# Patient Record
Sex: MALE | Race: WHITE | NOT HISPANIC OR LATINO | Employment: OTHER | URBAN - NONMETROPOLITAN AREA
[De-identification: names, ages, dates, MRNs, and addresses within clinical notes are randomized per-mention and may not be internally consistent; named-entity substitution may affect disease eponyms.]

---

## 2017-01-03 ENCOUNTER — HOSPITAL ENCOUNTER (OUTPATIENT)
Dept: OTHER | Facility: HOSPITAL | Age: 67
Discharge: HOME | End: 2017-01-03
Attending: NURSE PRACTITIONER | Admitting: NURSE PRACTITIONER

## 2017-01-03 ENCOUNTER — HOSPITAL ENCOUNTER (OUTPATIENT)
Dept: URGENT CARE | Facility: CLINIC | Age: 67
Discharge: HOME | End: 2017-01-03
Attending: FAMILY MEDICINE | Admitting: FAMILY MEDICINE

## 2017-01-03 ENCOUNTER — HOSPITAL ENCOUNTER (OUTPATIENT)
Dept: OTHER | Facility: HOSPITAL | Age: 67
Discharge: HOME | End: 2017-01-03

## 2017-01-03 LAB
ALBUMIN SERPL-MCNC: 3.7 GM/DL (ref 3.4–4.8)
ALP SERPL-CCNC: 58 U/L (ref 38–126)
ALT SERPL-CCNC: 31 U/L (ref 21–72)
ANION GAP SERPL CALCULATED.3IONS-SCNC: 10 MMOL/L (ref 5–15)
AST SERPL-CCNC: 22 U/L (ref 17–59)
BASOPHILS # BLD AUTO: 0.04 X1000/UL (ref 0–0.2)
BASOPHILS NFR BLD AUTO: 0.4 % (ref 0–2)
BILIRUB SERPL-MCNC: 0.5 MG/DL (ref 0.2–1.3)
BUN SERPL-MCNC: 34 MG/DL (ref 7–21)
CALCIUM SERPL-MCNC: 9.1 MG/DL (ref 8.4–10.2)
CHLORIDE SERPL-SCNC: 103 MMOL/L (ref 95–110)
CO2 SERPL-SCNC: 25 MMOL/L (ref 22–31)
CONV AUTO IG#: 0.05 X1000/UL (ref 0.01–0.02)
CONV AUTO IG%: 0.5 % (ref 0–0.5)
CREAT SERPL-MCNC: 3.3 MG/DL (ref 0.7–1.3)
EOSINOPHIL # BLD AUTO: 0.17 X1000/UL (ref 0–0.7)
EOSINOPHIL NFR BLD AUTO: 1.6 % (ref 0–7)
ERYTHROCYTE [DISTWIDTH] IN BLOOD: 13.4 % (ref 11.5–14.5)
GLUCOSE SERPL-MCNC: 137 MG/DL (ref 60–100)
GRANULOCYTES # BLD AUTO: 8.1 X1000/UL (ref 2–8.6)
GRANULOCYTES NFR BLD AUTO: 76.1 % (ref 37–80)
HCT VFR BLD CALC: 33.7 % (ref 39–49)
HGB BLD-MCNC: 11.7 GM/DL (ref 13.7–17.3)
LYMPHOCYTES # BLD AUTO: 1.36 X1000/UL (ref 0.6–4.2)
LYMPHOCYTES NFR BLD AUTO: 12.8 % (ref 10–50)
MCH RBC QN: 31.4 PG (ref 26–34)
MCHC RBC-ENTMCNC: 34.7 GM/DL (ref 31.5–36.3)
MCV RBC: 90.3 FL (ref 80–98)
MONOCYTES # BLD AUTO: 0.92 X1000/UL (ref 0–0.9)
MONOCYTES NFR BLD AUTO: 8.6 % (ref 0–12)
PLATELET # BLD: 200 X1000/MM3 (ref 150–450)
PMV BLD: 11.7 FL (ref 8–12)
POTASSIUM SERPL-SCNC: 4.8 MMOL/L (ref 3.5–5.1)
PROT SERPL-MCNC: 6.9 GM/DL (ref 6.3–8.6)
RBC # BLD: 3.73 MEGA/MM3 (ref 4.37–5.74)
SODIUM SERPL-SCNC: 138 MMOL/L (ref 137–145)
WBC # BLD: 10.6 X1000/UL (ref 3.2–9.8)

## 2017-03-01 ENCOUNTER — LAB (OUTPATIENT)
Dept: LAB | Facility: HOSPITAL | Age: 67
End: 2017-03-01

## 2017-03-01 ENCOUNTER — TRANSCRIBE ORDERS (OUTPATIENT)
Dept: LAB | Facility: HOSPITAL | Age: 67
End: 2017-03-01

## 2017-03-01 DIAGNOSIS — N18.9 ANEMIA OF CHRONIC RENAL FAILURE, UNSPECIFIED STAGE: ICD-10-CM

## 2017-03-01 DIAGNOSIS — D63.1 ANEMIA OF CHRONIC RENAL FAILURE, UNSPECIFIED STAGE: ICD-10-CM

## 2017-03-01 DIAGNOSIS — E78.5 DYSLIPIDEMIA: Primary | ICD-10-CM

## 2017-03-01 DIAGNOSIS — E78.5 DYSLIPIDEMIA: ICD-10-CM

## 2017-03-01 DIAGNOSIS — I10 ESSENTIAL HYPERTENSION, BENIGN: ICD-10-CM

## 2017-03-01 DIAGNOSIS — N18.4 CHRONIC KIDNEY DISEASE, STAGE 4 (SEVERE) (HCC): ICD-10-CM

## 2017-03-01 LAB
25(OH)D3 SERPL-MCNC: 37.7 NG/ML (ref 30–100)
ALBUMIN SERPL-MCNC: 3.8 G/DL (ref 3.4–4.8)
ANION GAP SERPL CALCULATED.3IONS-SCNC: 8 MMOL/L (ref 5–15)
BUN BLD-MCNC: 40 MG/DL (ref 7–21)
BUN/CREAT SERPL: 12.8 (ref 7–25)
CALCIUM SPEC-SCNC: 9.1 MG/DL (ref 8.4–10.2)
CHLORIDE SERPL-SCNC: 105 MMOL/L (ref 95–110)
CO2 SERPL-SCNC: 25 MMOL/L (ref 22–31)
CREAT BLD-MCNC: 3.13 MG/DL (ref 0.7–1.3)
FERRITIN SERPL-MCNC: 86.1 NG/ML (ref 17.9–464)
FOLATE SERPL-MCNC: >20 NG/ML (ref 2.76–21)
GFR SERPL CREATININE-BSD FRML MDRD: 20 ML/MIN/1.73 (ref 49–113)
GLUCOSE BLD-MCNC: 96 MG/DL (ref 60–100)
HCT VFR BLD AUTO: 32 % (ref 39–49)
HGB BLD-MCNC: 10.9 G/DL (ref 13.7–17.3)
IRON 24H UR-MRATE: 84 MCG/DL (ref 49–181)
IRON SATN MFR SERPL: 27 % (ref 20–55)
PHOSPHATE SERPL-MCNC: 4.1 MG/DL (ref 2.4–4.4)
POTASSIUM BLD-SCNC: 4.8 MMOL/L (ref 3.5–5.1)
SODIUM BLD-SCNC: 138 MMOL/L (ref 137–145)
TIBC SERPL-MCNC: 311 MCG/DL (ref 261–462)
VIT B12 BLD-MCNC: 805 PG/ML (ref 239–931)

## 2017-03-01 PROCEDURE — 82306 VITAMIN D 25 HYDROXY: CPT | Performed by: INTERNAL MEDICINE

## 2017-03-01 PROCEDURE — 80069 RENAL FUNCTION PANEL: CPT | Performed by: INTERNAL MEDICINE

## 2017-03-01 PROCEDURE — 82607 VITAMIN B-12: CPT | Performed by: INTERNAL MEDICINE

## 2017-03-01 PROCEDURE — 83550 IRON BINDING TEST: CPT | Performed by: INTERNAL MEDICINE

## 2017-03-01 PROCEDURE — 82310 ASSAY OF CALCIUM: CPT | Performed by: INTERNAL MEDICINE

## 2017-03-01 PROCEDURE — 36415 COLL VENOUS BLD VENIPUNCTURE: CPT

## 2017-03-01 PROCEDURE — 83540 ASSAY OF IRON: CPT | Performed by: INTERNAL MEDICINE

## 2017-03-01 PROCEDURE — 85018 HEMOGLOBIN: CPT | Performed by: INTERNAL MEDICINE

## 2017-03-01 PROCEDURE — 85014 HEMATOCRIT: CPT | Performed by: INTERNAL MEDICINE

## 2017-03-01 PROCEDURE — 82746 ASSAY OF FOLIC ACID SERUM: CPT | Performed by: INTERNAL MEDICINE

## 2017-03-01 PROCEDURE — 83970 ASSAY OF PARATHORMONE: CPT | Performed by: INTERNAL MEDICINE

## 2017-03-01 PROCEDURE — 82728 ASSAY OF FERRITIN: CPT | Performed by: INTERNAL MEDICINE

## 2017-03-03 LAB
CALCIUM SPEC-SCNC: 9.2 MG/DL (ref 8.4–10.2)
PTH-INTACT SERPL-MCNC: 89.3 PG/ML (ref 10–65)

## 2017-06-01 ENCOUNTER — LAB (OUTPATIENT)
Dept: LAB | Facility: HOSPITAL | Age: 67
End: 2017-06-01

## 2017-06-01 ENCOUNTER — TRANSCRIBE ORDERS (OUTPATIENT)
Dept: LAB | Facility: HOSPITAL | Age: 67
End: 2017-06-01

## 2017-06-01 DIAGNOSIS — E78.5 DYSLIPIDEMIA: Primary | ICD-10-CM

## 2017-06-01 DIAGNOSIS — N18.9 ANEMIA IN CKD (CHRONIC KIDNEY DISEASE): ICD-10-CM

## 2017-06-01 DIAGNOSIS — E78.5 DYSLIPIDEMIA: ICD-10-CM

## 2017-06-01 DIAGNOSIS — N18.4 CHRONIC KIDNEY DISEASE, STAGE IV (SEVERE) (HCC): ICD-10-CM

## 2017-06-01 DIAGNOSIS — I10 ESSENTIAL HYPERTENSION, MALIGNANT: ICD-10-CM

## 2017-06-01 DIAGNOSIS — D63.1 ANEMIA IN CKD (CHRONIC KIDNEY DISEASE): ICD-10-CM

## 2017-06-01 LAB
ALBUMIN SERPL-MCNC: 3.9 G/DL (ref 3.4–4.8)
ANION GAP SERPL CALCULATED.3IONS-SCNC: 9 MMOL/L (ref 5–15)
BUN BLD-MCNC: 45 MG/DL (ref 7–21)
BUN/CREAT SERPL: 11 (ref 7–25)
CALCIUM SPEC-SCNC: 9 MG/DL (ref 8.4–10.2)
CHLORIDE SERPL-SCNC: 103 MMOL/L (ref 95–110)
CO2 SERPL-SCNC: 23 MMOL/L (ref 22–31)
CREAT BLD-MCNC: 4.1 MG/DL (ref 0.7–1.3)
GFR SERPL CREATININE-BSD FRML MDRD: 15 ML/MIN/1.73 (ref 49–113)
GLUCOSE BLD-MCNC: 96 MG/DL (ref 60–100)
HCT VFR BLD AUTO: 31.5 % (ref 39–49)
HGB BLD-MCNC: 10.7 G/DL (ref 13.7–17.3)
PHOSPHATE SERPL-MCNC: 5.4 MG/DL (ref 2.4–4.4)
POTASSIUM BLD-SCNC: 5.3 MMOL/L (ref 3.5–5.1)
SODIUM BLD-SCNC: 135 MMOL/L (ref 137–145)

## 2017-06-01 PROCEDURE — 36415 COLL VENOUS BLD VENIPUNCTURE: CPT

## 2017-06-01 PROCEDURE — 85014 HEMATOCRIT: CPT | Performed by: INTERNAL MEDICINE

## 2017-06-01 PROCEDURE — 80069 RENAL FUNCTION PANEL: CPT | Performed by: INTERNAL MEDICINE

## 2017-06-01 PROCEDURE — 85018 HEMOGLOBIN: CPT | Performed by: INTERNAL MEDICINE

## 2017-07-14 ENCOUNTER — TRANSCRIBE ORDERS (OUTPATIENT)
Dept: LAB | Facility: HOSPITAL | Age: 67
End: 2017-07-14

## 2017-07-14 ENCOUNTER — APPOINTMENT (OUTPATIENT)
Dept: LAB | Facility: HOSPITAL | Age: 67
End: 2017-07-14

## 2017-07-14 DIAGNOSIS — D63.1 ANEMIA OF CHRONIC RENAL FAILURE, UNSPECIFIED STAGE: ICD-10-CM

## 2017-07-14 DIAGNOSIS — E78.5 HYPERLIPIDEMIA, UNSPECIFIED HYPERLIPIDEMIA TYPE: Primary | ICD-10-CM

## 2017-07-14 DIAGNOSIS — N18.4 CHRONIC KIDNEY DISEASE, STAGE IV (SEVERE) (HCC): ICD-10-CM

## 2017-07-14 DIAGNOSIS — N18.9 ANEMIA OF CHRONIC RENAL FAILURE, UNSPECIFIED STAGE: ICD-10-CM

## 2017-07-14 DIAGNOSIS — I10 UNSPECIFIED ESSENTIAL HYPERTENSION: ICD-10-CM

## 2017-07-14 LAB
ALBUMIN SERPL-MCNC: 3.9 G/DL (ref 3.4–4.8)
ANION GAP SERPL CALCULATED.3IONS-SCNC: 9 MMOL/L (ref 5–15)
BUN BLD-MCNC: 44 MG/DL (ref 7–21)
BUN/CREAT SERPL: 9.6 (ref 7–25)
CALCIUM SPEC-SCNC: 9 MG/DL (ref 8.4–10.2)
CHLORIDE SERPL-SCNC: 107 MMOL/L (ref 95–110)
CO2 SERPL-SCNC: 23 MMOL/L (ref 22–31)
CREAT BLD-MCNC: 4.6 MG/DL (ref 0.7–1.3)
GFR SERPL CREATININE-BSD FRML MDRD: 13 ML/MIN/1.73 (ref 49–113)
GLUCOSE BLD-MCNC: 92 MG/DL (ref 60–100)
PHOSPHATE SERPL-MCNC: 5 MG/DL (ref 2.4–4.4)
POTASSIUM BLD-SCNC: 5 MMOL/L (ref 3.5–5.1)
SODIUM BLD-SCNC: 139 MMOL/L (ref 137–145)

## 2017-07-14 PROCEDURE — 36415 COLL VENOUS BLD VENIPUNCTURE: CPT | Performed by: INTERNAL MEDICINE

## 2017-07-14 PROCEDURE — 80069 RENAL FUNCTION PANEL: CPT | Performed by: INTERNAL MEDICINE

## 2017-08-18 ENCOUNTER — TRANSCRIBE ORDERS (OUTPATIENT)
Dept: LAB | Facility: HOSPITAL | Age: 67
End: 2017-08-18

## 2017-08-18 ENCOUNTER — LAB (OUTPATIENT)
Dept: LAB | Facility: HOSPITAL | Age: 67
End: 2017-08-18

## 2017-08-18 DIAGNOSIS — E78.5 DYSLIPIDEMIA: ICD-10-CM

## 2017-08-18 DIAGNOSIS — N18.4 ANEMIA OF CHRONIC KIDNEY FAILURE, STAGE 4 (SEVERE) (HCC): ICD-10-CM

## 2017-08-18 DIAGNOSIS — E78.5 DYSLIPIDEMIA: Primary | ICD-10-CM

## 2017-08-18 DIAGNOSIS — N18.4 CHRONIC KIDNEY DISEASE, STAGE IV (SEVERE) (HCC): ICD-10-CM

## 2017-08-18 DIAGNOSIS — D63.1 ANEMIA OF CHRONIC KIDNEY FAILURE, STAGE 4 (SEVERE) (HCC): ICD-10-CM

## 2017-08-18 DIAGNOSIS — I10 ESSENTIAL HYPERTENSION: ICD-10-CM

## 2017-08-18 LAB
ALBUMIN SERPL-MCNC: 4.2 G/DL (ref 3.4–4.8)
ANION GAP SERPL CALCULATED.3IONS-SCNC: 12 MMOL/L (ref 5–15)
BUN BLD-MCNC: 63 MG/DL (ref 7–21)
BUN/CREAT SERPL: 10.1 (ref 7–25)
CALCIUM SPEC-SCNC: 9.5 MG/DL (ref 8.4–10.2)
CHLORIDE SERPL-SCNC: 103 MMOL/L (ref 95–110)
CO2 SERPL-SCNC: 23 MMOL/L (ref 22–31)
CREAT BLD-MCNC: 6.22 MG/DL (ref 0.7–1.3)
GFR SERPL CREATININE-BSD FRML MDRD: 9 ML/MIN/1.73 (ref 49–113)
GLUCOSE BLD-MCNC: 99 MG/DL (ref 60–100)
HBV SURFACE AB SER QL: <5 (ref 0–4.99)
HBV SURFACE AB SER RIA-ACNC: ABNORMAL
HCT VFR BLD AUTO: 31.5 % (ref 39–49)
HGB BLD-MCNC: 10.9 G/DL (ref 13.7–17.3)
PHOSPHATE SERPL-MCNC: 6.4 MG/DL (ref 2.4–4.4)
POTASSIUM BLD-SCNC: 4.7 MMOL/L (ref 3.5–5.1)
SODIUM BLD-SCNC: 138 MMOL/L (ref 137–145)

## 2017-08-18 PROCEDURE — 85018 HEMOGLOBIN: CPT | Performed by: INTERNAL MEDICINE

## 2017-08-18 PROCEDURE — 85014 HEMATOCRIT: CPT | Performed by: INTERNAL MEDICINE

## 2017-08-18 PROCEDURE — 36415 COLL VENOUS BLD VENIPUNCTURE: CPT

## 2017-08-18 PROCEDURE — 80069 RENAL FUNCTION PANEL: CPT | Performed by: INTERNAL MEDICINE

## 2017-08-18 PROCEDURE — 86706 HEP B SURFACE ANTIBODY: CPT | Performed by: INTERNAL MEDICINE

## 2017-08-22 ENCOUNTER — HOSPITAL ENCOUNTER (INPATIENT)
Facility: HOSPITAL | Age: 67
LOS: 3 days | Discharge: HOME | End: 2017-08-25
Attending: FAMILY MEDICINE | Admitting: FAMILY MEDICINE

## 2017-08-22 ENCOUNTER — APPOINTMENT (OUTPATIENT)
Dept: GENERAL RADIOLOGY | Facility: HOSPITAL | Age: 67
End: 2017-08-22

## 2017-08-22 DIAGNOSIS — N18.6 ESRD (END STAGE RENAL DISEASE) (HCC): Primary | ICD-10-CM

## 2017-08-22 LAB
ALBUMIN SERPL-MCNC: 4.1 G/DL (ref 3.4–4.8)
ALBUMIN SERPL-MCNC: 4.3 G/DL (ref 3.4–4.8)
ALBUMIN/GLOB SERPL: 1.3 G/DL (ref 1.1–1.8)
ALP SERPL-CCNC: 45 U/L (ref 38–126)
ALT SERPL W P-5'-P-CCNC: 39 U/L (ref 21–72)
ANION GAP SERPL CALCULATED.3IONS-SCNC: 12 MMOL/L (ref 5–15)
ANION GAP SERPL CALCULATED.3IONS-SCNC: 12 MMOL/L (ref 5–15)
AST SERPL-CCNC: 21 U/L (ref 17–59)
BACTERIA UR QL AUTO: NORMAL /HPF
BASOPHILS # BLD AUTO: 0.08 10*3/MM3 (ref 0–0.2)
BASOPHILS NFR BLD AUTO: 1.3 % (ref 0–2)
BILIRUB SERPL-MCNC: 0.6 MG/DL (ref 0.2–1.3)
BILIRUB UR QL STRIP: NEGATIVE
BUN BLD-MCNC: 60 MG/DL (ref 7–21)
BUN BLD-MCNC: 61 MG/DL (ref 7–21)
BUN/CREAT SERPL: 10 (ref 7–25)
BUN/CREAT SERPL: 9.8 (ref 7–25)
CALCIUM SPEC-SCNC: 9 MG/DL (ref 8.4–10.2)
CALCIUM SPEC-SCNC: 9.1 MG/DL (ref 8.4–10.2)
CHLORIDE SERPL-SCNC: 104 MMOL/L (ref 95–110)
CHLORIDE SERPL-SCNC: 104 MMOL/L (ref 95–110)
CLARITY UR: CLEAR
CO2 SERPL-SCNC: 21 MMOL/L (ref 22–31)
CO2 SERPL-SCNC: 21 MMOL/L (ref 22–31)
COLOR UR: YELLOW
CREAT BLD-MCNC: 6.1 MG/DL (ref 0.7–1.3)
CREAT BLD-MCNC: 6.1 MG/DL (ref 0.7–1.3)
DEPRECATED RDW RBC AUTO: 47.4 FL (ref 35.1–43.9)
EOSINOPHIL # BLD AUTO: 0.3 10*3/MM3 (ref 0–0.7)
EOSINOPHIL NFR BLD AUTO: 4.7 % (ref 0–7)
ERYTHROCYTE [DISTWIDTH] IN BLOOD BY AUTOMATED COUNT: 14.1 % (ref 11.5–14.5)
GFR SERPL CREATININE-BSD FRML MDRD: 9 ML/MIN/1.73 (ref 49–113)
GFR SERPL CREATININE-BSD FRML MDRD: 9 ML/MIN/1.73 (ref 49–113)
GLOBULIN UR ELPH-MCNC: 3.3 GM/DL (ref 2.3–3.5)
GLUCOSE BLD-MCNC: 86 MG/DL (ref 60–100)
GLUCOSE BLD-MCNC: 88 MG/DL (ref 60–100)
GLUCOSE UR STRIP-MCNC: NEGATIVE MG/DL
HCT VFR BLD AUTO: 29.9 % (ref 39–49)
HGB BLD-MCNC: 10.3 G/DL (ref 13.7–17.3)
HGB UR QL STRIP.AUTO: ABNORMAL
HYALINE CASTS UR QL AUTO: NORMAL /LPF
IMM GRANULOCYTES # BLD: 0.04 10*3/MM3 (ref 0–0.02)
IMM GRANULOCYTES NFR BLD: 0.6 % (ref 0–0.5)
KETONES UR QL STRIP: NEGATIVE
LEUKOCYTE ESTERASE UR QL STRIP.AUTO: NEGATIVE
LYMPHOCYTES # BLD AUTO: 1.5 10*3/MM3 (ref 0.6–4.2)
LYMPHOCYTES NFR BLD AUTO: 23.5 % (ref 10–50)
MCH RBC QN AUTO: 31.6 PG (ref 26.5–34)
MCHC RBC AUTO-ENTMCNC: 34.4 G/DL (ref 31.5–36.3)
MCV RBC AUTO: 91.7 FL (ref 80–98)
MONOCYTES # BLD AUTO: 0.38 10*3/MM3 (ref 0–0.9)
MONOCYTES NFR BLD AUTO: 6 % (ref 0–12)
NEUTROPHILS # BLD AUTO: 4.08 10*3/MM3 (ref 2–8.6)
NEUTROPHILS NFR BLD AUTO: 63.9 % (ref 37–80)
NITRITE UR QL STRIP: NEGATIVE
PH UR STRIP.AUTO: 6.5 [PH] (ref 5–9)
PHOSPHATE SERPL-MCNC: 6 MG/DL (ref 2.4–4.4)
PLATELET # BLD AUTO: 209 10*3/MM3 (ref 150–450)
PMV BLD AUTO: 12.5 FL (ref 8–12)
POTASSIUM BLD-SCNC: 4.6 MMOL/L (ref 3.5–5.1)
POTASSIUM BLD-SCNC: 4.6 MMOL/L (ref 3.5–5.1)
PROT SERPL-MCNC: 7.6 G/DL (ref 6.3–8.6)
PROT UR QL STRIP: ABNORMAL
RBC # BLD AUTO: 3.26 10*6/MM3 (ref 4.37–5.74)
RBC # UR: NORMAL /HPF
REF LAB TEST METHOD: NORMAL
SODIUM BLD-SCNC: 137 MMOL/L (ref 137–145)
SODIUM BLD-SCNC: 137 MMOL/L (ref 137–145)
SP GR UR STRIP: 1.01 (ref 1–1.03)
SQUAMOUS #/AREA URNS HPF: NORMAL /HPF
TROPONIN I SERPL-MCNC: <0.012 NG/ML
UROBILINOGEN UR QL STRIP: ABNORMAL
WBC NRBC COR # BLD: 6.38 10*3/MM3 (ref 3.2–9.8)
WBC UR QL AUTO: NORMAL /HPF
WHOLE BLOOD HOLD SPECIMEN: NORMAL

## 2017-08-22 PROCEDURE — 84484 ASSAY OF TROPONIN QUANT: CPT | Performed by: FAMILY MEDICINE

## 2017-08-22 PROCEDURE — 93005 ELECTROCARDIOGRAM TRACING: CPT | Performed by: FAMILY MEDICINE

## 2017-08-22 PROCEDURE — 80053 COMPREHEN METABOLIC PANEL: CPT | Performed by: FAMILY MEDICINE

## 2017-08-22 PROCEDURE — 93010 ELECTROCARDIOGRAM REPORT: CPT | Performed by: INTERNAL MEDICINE

## 2017-08-22 PROCEDURE — 87086 URINE CULTURE/COLONY COUNT: CPT | Performed by: FAMILY MEDICINE

## 2017-08-22 PROCEDURE — 81001 URINALYSIS AUTO W/SCOPE: CPT | Performed by: FAMILY MEDICINE

## 2017-08-22 PROCEDURE — 85025 COMPLETE CBC W/AUTO DIFF WBC: CPT | Performed by: FAMILY MEDICINE

## 2017-08-22 PROCEDURE — 80069 RENAL FUNCTION PANEL: CPT | Performed by: INTERNAL MEDICINE

## 2017-08-22 PROCEDURE — 71020 HC CHEST PA AND LATERAL: CPT

## 2017-08-22 RX ORDER — FLUTICASONE PROPIONATE 50 MCG
1 SPRAY, SUSPENSION (ML) NASAL DAILY
Status: DISCONTINUED | OUTPATIENT
Start: 2017-08-22 | End: 2017-08-25 | Stop reason: HOSPADM

## 2017-08-22 RX ORDER — SODIUM CHLORIDE 0.9 % (FLUSH) 0.9 %
1-10 SYRINGE (ML) INJECTION AS NEEDED
Status: DISCONTINUED | OUTPATIENT
Start: 2017-08-22 | End: 2017-08-25 | Stop reason: HOSPADM

## 2017-08-22 RX ORDER — BUMETANIDE 1 MG/1
2 TABLET ORAL DAILY
Status: DISCONTINUED | OUTPATIENT
Start: 2017-08-22 | End: 2017-08-25 | Stop reason: HOSPADM

## 2017-08-22 RX ORDER — ACETAMINOPHEN 325 MG/1
650 TABLET ORAL EVERY 6 HOURS PRN
Status: DISCONTINUED | OUTPATIENT
Start: 2017-08-22 | End: 2017-08-22 | Stop reason: SDUPTHER

## 2017-08-22 RX ORDER — AMLODIPINE BESYLATE 5 MG/1
5 TABLET ORAL DAILY
Status: DISCONTINUED | OUTPATIENT
Start: 2017-08-22 | End: 2017-08-24

## 2017-08-22 RX ORDER — LIDOCAINE HYDROCHLORIDE 10 MG/ML
0.8 INJECTION, SOLUTION EPIDURAL; INFILTRATION; INTRACAUDAL; PERINEURAL DAILY PRN
Status: DISCONTINUED | OUTPATIENT
Start: 2017-08-22 | End: 2017-08-22 | Stop reason: CLARIF

## 2017-08-22 RX ORDER — DOCUSATE SODIUM 100 MG/1
100 CAPSULE, LIQUID FILLED ORAL 2 TIMES DAILY
Status: DISCONTINUED | OUTPATIENT
Start: 2017-08-22 | End: 2017-08-25 | Stop reason: HOSPADM

## 2017-08-22 RX ORDER — SENNOSIDES 8.6 MG
650 CAPSULE ORAL EVERY 8 HOURS PRN
COMMUNITY

## 2017-08-22 RX ORDER — SODIUM CHLORIDE 9 MG/ML
100 INJECTION, SOLUTION INTRAVENOUS CONTINUOUS
Status: DISCONTINUED | OUTPATIENT
Start: 2017-08-22 | End: 2017-08-22

## 2017-08-22 RX ORDER — ROSUVASTATIN CALCIUM 10 MG/1
10 TABLET, COATED ORAL NIGHTLY
Status: DISCONTINUED | OUTPATIENT
Start: 2017-08-22 | End: 2017-08-25 | Stop reason: HOSPADM

## 2017-08-22 RX ORDER — HEPARIN SODIUM 1000 [USP'U]/ML
2000 INJECTION, SOLUTION INTRAVENOUS; SUBCUTANEOUS AS NEEDED
Status: DISCONTINUED | OUTPATIENT
Start: 2017-08-23 | End: 2017-08-25 | Stop reason: HOSPADM

## 2017-08-22 RX ORDER — LIDOCAINE HYDROCHLORIDE 10 MG/ML
0.8 INJECTION, SOLUTION EPIDURAL; INFILTRATION; INTRACAUDAL; PERINEURAL DAILY PRN
Status: DISCONTINUED | OUTPATIENT
Start: 2017-08-22 | End: 2017-08-25 | Stop reason: HOSPADM

## 2017-08-22 RX ORDER — ACETAMINOPHEN 325 MG/1
650 TABLET ORAL EVERY 4 HOURS PRN
Status: DISCONTINUED | OUTPATIENT
Start: 2017-08-22 | End: 2017-08-25 | Stop reason: HOSPADM

## 2017-08-22 RX ORDER — LEVOTHYROXINE SODIUM 0.05 MG/1
50 TABLET ORAL
Status: DISCONTINUED | OUTPATIENT
Start: 2017-08-23 | End: 2017-08-25 | Stop reason: HOSPADM

## 2017-08-22 RX ORDER — ALLOPURINOL 100 MG/1
100 TABLET ORAL DAILY
Status: DISCONTINUED | OUTPATIENT
Start: 2017-08-22 | End: 2017-08-25 | Stop reason: HOSPADM

## 2017-08-22 RX ORDER — ALBUTEROL SULFATE 2.5 MG/3ML
2.5 SOLUTION RESPIRATORY (INHALATION) EVERY 6 HOURS PRN
Status: DISCONTINUED | OUTPATIENT
Start: 2017-08-22 | End: 2017-08-25 | Stop reason: HOSPADM

## 2017-08-22 RX ORDER — BENZONATATE 100 MG/1
200 CAPSULE ORAL 3 TIMES DAILY PRN
Status: DISCONTINUED | OUTPATIENT
Start: 2017-08-22 | End: 2017-08-25 | Stop reason: HOSPADM

## 2017-08-22 RX ORDER — METOPROLOL SUCCINATE 100 MG/1
100 TABLET, EXTENDED RELEASE ORAL
Status: DISCONTINUED | OUTPATIENT
Start: 2017-08-22 | End: 2017-08-25

## 2017-08-22 RX ORDER — MINOXIDIL 10 MG/1
10 TABLET ORAL 2 TIMES DAILY
Status: DISCONTINUED | OUTPATIENT
Start: 2017-08-22 | End: 2017-08-22

## 2017-08-22 RX ORDER — FAMOTIDINE 40 MG/1
40 TABLET, FILM COATED ORAL DAILY
Status: DISCONTINUED | OUTPATIENT
Start: 2017-08-22 | End: 2017-08-25 | Stop reason: HOSPADM

## 2017-08-22 RX ORDER — BUMETANIDE 2 MG/1
2 TABLET ORAL DAILY
COMMUNITY
End: 2020-06-15

## 2017-08-22 RX ORDER — MINOXIDIL 10 MG/1
5 TABLET ORAL
Status: DISCONTINUED | OUTPATIENT
Start: 2017-08-23 | End: 2017-08-24

## 2017-08-22 RX ORDER — AMLODIPINE BESYLATE 5 MG/1
5 TABLET ORAL DAILY
COMMUNITY
End: 2017-08-25 | Stop reason: HOSPADM

## 2017-08-22 RX ORDER — ONDANSETRON 2 MG/ML
4 INJECTION INTRAMUSCULAR; INTRAVENOUS EVERY 4 HOURS PRN
Status: DISCONTINUED | OUTPATIENT
Start: 2017-08-22 | End: 2017-08-25 | Stop reason: HOSPADM

## 2017-08-22 RX ORDER — LIDOCAINE AND PRILOCAINE 25; 25 MG/G; MG/G
CREAM TOPICAL DAILY PRN
Status: DISCONTINUED | OUTPATIENT
Start: 2017-08-23 | End: 2017-08-25 | Stop reason: HOSPADM

## 2017-08-22 RX ADMIN — MINOXIDIL 10 MG: 10 TABLET ORAL at 17:26

## 2017-08-22 RX ADMIN — METOPROLOL SUCCINATE 100 MG: 100 TABLET, EXTENDED RELEASE ORAL at 14:11

## 2017-08-22 RX ADMIN — BUMETANIDE 2 MG: 1 TABLET ORAL at 14:11

## 2017-08-22 RX ADMIN — SODIUM CHLORIDE 1000 ML: 900 INJECTION, SOLUTION INTRAVENOUS at 13:29

## 2017-08-22 RX ADMIN — AMLODIPINE BESYLATE 5 MG: 5 TABLET ORAL at 14:11

## 2017-08-22 RX ADMIN — ALLOPURINOL 100 MG: 100 TABLET ORAL at 14:11

## 2017-08-22 RX ADMIN — SODIUM CHLORIDE 1000 ML: 900 INJECTION, SOLUTION INTRAVENOUS at 15:00

## 2017-08-22 RX ADMIN — DOCUSATE SODIUM 100 MG: 100 CAPSULE, LIQUID FILLED ORAL at 17:30

## 2017-08-22 RX ADMIN — FAMOTIDINE 40 MG: 40 TABLET ORAL at 14:11

## 2017-08-22 RX ADMIN — ACETAMINOPHEN 650 MG: 325 TABLET ORAL at 17:26

## 2017-08-22 RX ADMIN — ROSUVASTATIN CALCIUM 10 MG: 10 TABLET, FILM COATED ORAL at 20:05

## 2017-08-22 RX ADMIN — SODIUM CHLORIDE 100 ML/HR: 900 INJECTION, SOLUTION INTRAVENOUS at 13:30

## 2017-08-22 NOTE — PLAN OF CARE
Problem: Patient Care Overview (Adult)  Goal: Plan of Care Review  Outcome: Ongoing (interventions implemented as appropriate)    08/22/17 4939   Coping/Psychosocial Response Interventions   Plan Of Care Reviewed With patient   Patient Care Overview   Progress no change   Outcome Evaluation   Outcome Summary/Follow up Plan Pt oriented to unit and provided information about dialysis       Goal: Adult Individualization and Mutuality  Outcome: Ongoing (interventions implemented as appropriate)  Goal: Discharge Needs Assessment  Outcome: Ongoing (interventions implemented as appropriate)    Problem: Chronic Kidney Disease/End Stage Renal Disease (Adult)  Goal: Signs and Symptoms of Listed Potential Problems Will be Absent or Manageable (Chronic Kidney Disease/End Stage Renal Disease)  Outcome: Ongoing (interventions implemented as appropriate)

## 2017-08-22 NOTE — CONSULTS
[facility]  [address] [place] [address]. [address]   - [phone]  F  [phone]     Consultation         PATIENT  DEMOGRAPHICS   PATIENT NAME: Jesús Austin                      PHYSICIAN: Anca Merrill MD  : 1950  MRN: [id number]    Subjective   SUBJECTIVE   Referring Provider: Dr Mckeon  Reason for Consultation: CKD 5  History of present illness:      Mr. Norman yousif is a pleasant 66-year-old  male who has past medical history of hypertension rheumatic fever peripheral arterial disease iron deficiency anemia AAA repair in 2016, right iliac artery stent and advanced chronic kidney disease to stage V.  Most recently he has a creatinine of more than 4 and also become more short of air.  We have put him on Bumex in the last visit.  He also has issues with high potassium as well.     Patient has a left radiocephalic AV fistula placed on .  This has matured well and we were awaiting its maturity and then start hemodialysis.  He had a repeat blood work which showed a creatinine of 6.4 and we have therefore decided to bring him in and start dialysis soon. he Some shortness of air on exertion.  He also has epigastric fullness and some possible acid reflux.      Past Medical History:   Diagnosis Date   • Arthritis    • Disease of thyroid gland    • Hypertension    • Kidney stone      Past Surgical History:   Procedure Laterality Date   • ABDOMINAL AORTIC ANEURYSM REPAIR  2016    2 repairs done at same time in [place]   • ARTERIOVENOUS FISTULA Left 2017    fistula placed in left forearm   • TESTICLE TORSION REPAIR N/A 1964    Pt not sure which side     Family History   Problem Relation Age of Onset   • Heart failure Father      Social History   Substance Use Topics   • Smoking status: Never Smoker   • Smokeless tobacco: Never Used   • Alcohol use No     Allergies:  Morphine and Tape     REVIEW OF SYSTEMS    Review of Systems   Constitutional:  Negative for chills and fever.   Respiratory: Positive for shortness of breath. Negative for chest tightness.    Cardiovascular: Negative for chest pain and leg swelling.   Gastrointestinal: Negative for abdominal pain, diarrhea and nausea.   Genitourinary: Negative for dysuria, flank pain and hematuria.   Neurological: Negative for dizziness, syncope and weakness.       Objective   OBJECTIVE   Vital Signs  Temp:  [97.4 °F (36.3 °C)-98 °F (36.7 °C)] 98 °F (36.7 °C)  Heart Rate:  [68-80] 75  Resp:  [18] 18  BP: (137-142)/(78-81) 142/81         I/O last 3 completed shifts:  In: 240 [P.O.:240]  Out: 1390 [Urine:1390]    PHYSICAL EXAM    Physical Exam   Constitutional: He is oriented to person, place, and time. He appears well-developed.   HENT:   Head: Normocephalic.   Eyes: Pupils are equal, round, and reactive to light.   Cardiovascular: Normal rate, regular rhythm and normal heart sounds.    Pulmonary/Chest: Effort normal and breath sounds normal.   Abdominal: Soft. Bowel sounds are normal.   Musculoskeletal: He exhibits edema.   Left radiocephalic avf good thrill   Neurological: He is alert and oriented to person, place, and time.       RESULTS   Results Review:      Results from last 7 days  Lab Units 08/22/17  1220 08/18/17  1101   SODIUM mmol/L 137  137 138   POTASSIUM mmol/L 4.6  4.6 4.7   CHLORIDE mmol/L 104  104 103   CO2 mmol/L 21.0*  21.0* 23.0   BUN mg/dL 61*  60* 63*   CREATININE mg/dL 6.10*  6.10* 6.22*   CALCIUM mg/dL 9.1  9.0 9.5   BILIRUBIN mg/dL 0.6  --    ALK PHOS U/L 45  --    ALT (SGPT) U/L 39  --    AST (SGOT) U/L 21  --    GLUCOSE mg/dL 88  86 99       Estimated Creatinine Clearance: 14.7 mL/min (by C-G formula based on Cr of 6.1).      Results from last 7 days  Lab Units 08/22/17  1220 08/18/17  1101   PHOSPHORUS mg/dL 6.0* 6.4*               Results from last 7 days  Lab Units 08/22/17  1230 08/18/17  1101   WBC 10*3/mm3 6.38  --    HEMOGLOBIN g/dL 10.3* 10.9*   PLATELETS 10*3/mm3 209   --               MEDICATIONS        sodium chloride 100 mL/hr Last Rate: 100 mL/hr (08/22/17 1330)     Prescriptions Prior to Admission   Medication Sig Dispense Refill Last Dose   • acetaminophen (TYLENOL) 650 MG 8 hr tablet Take 650 mg by mouth Every 8 (Eight) Hours As Needed for Mild Pain .      • allopurinol (ZYLOPRIM) 100 MG tablet Take 100 mg by mouth.   8/21/2017 at Unknown time   • amLODIPine (NORVASC) 5 MG tablet Take 5 mg by mouth Daily.   8/21/2017 at Unknown time   • bumetanide (BUMEX) 2 MG tablet Take 2 mg by mouth Daily.   8/22/2017 at Unknown time   • CIMETIDINE ACID REDUCER PO Take  by mouth 2 (Two) Times a Day.   8/22/2017 at Unknown time   • CO ENZYME Q-10 PO Take 100 mg by mouth Daily.   8/22/2017 at Unknown time   • cyclobenzaprine (FLEXERIL) 10 MG tablet Take 10 mg by mouth 3 (Three) Times a Day As Needed.   Past Week at Unknown time   • DOCUSATE SODIUM PO Take  by mouth Daily.   8/22/2017 at Unknown time   • levothyroxine (SYNTHROID, LEVOTHROID) 50 MCG tablet Take 50 mcg by mouth.   8/22/2017 at Unknown time   • metoprolol succinate XL (TOPROL-XL) 50 MG 24 hr tablet Take 100 mg by mouth 2 (Two) Times a Day.   8/22/2017 at Unknown time   • Omega-3 Fatty Acids (FISH OIL) 1000 MG capsule capsule Take 1,200 mg by mouth Every Night.   8/21/2017 at Unknown time   • rosuvastatin (CRESTOR) 10 MG tablet Take 10 mg by mouth.   8/22/2017 at Unknown time   • albuterol (PROVENTIL HFA;VENTOLIN HFA) 108 (90 BASE) MCG/ACT inhaler Inhale 2 puffs Every 4 (Four) Hours As Needed for wheezing or shortness of air. 1 inhaler 0 More than a month at Unknown time   • benzonatate (TESSALON) 100 MG capsule Take 2 capsules by mouth 3 (Three) Times a Day As Needed for cough. 30 capsule 0 More than a month at Unknown time   • DHA-EPA-VITAMIN E PO Take  by mouth.      • Diphenhydramine-APAP, sleep, (ACETAMINOPHEN PM EX ST PO) Take  by mouth.      • fluticasone (FLONASE) 50 MCG/ACT nasal spray 1 spray into each nostril.   More  than a month at Unknown time   • guaiFENesin (MUCINEX) 600 MG 12 hr tablet Take 2 tablets by mouth 2 (Two) Times a Day. 40 tablet 0 2/9/2017 at 2000   • minoxidil (LONITEN) 10 MG tablet Take 10 mg by mouth 2 (Two) Times a Day.   2/9/2017 at 2000   • Multiple Vitamins-Minerals (MULTIVITAMIN ADULT PO) Take 1 tablet by mouth Daily.   2/9/2017 at 2000     Assessment/Plan   ASSESSMENT / PLAN    Active Problems:    ESRD (end stage renal disease)    1.CKD 5 now worsening uremia with GFR of only 9.  Patient doesn't have any marked fluid overload but does have severe uremia and shortness of breath on exertion.  I will start his hemodialysis from today.  We'll try to cannulate his left AV fistula with smaller needle.  We'll do it again hemodialysis tomorrow and will run at low blood flow.  I'll arrange a 2 potassium bath and try to remove at least 2-3 L today.  I'll also ask case management to evaluate him for outpatient dialysis set up.  He is not interested in transplant referral.    2.hypertension - his blood pressure is stable and I will keep metoprolol and amlodipine and minoxidil for now.  We have lowered her minoxidil to once a day in the last visit We may consider ACE inhibitor later on if he tolerates.    3.anemia of chronic kidney disease.  Patient hemoglobin is up to 10.9.  Continue with iron.  No plan for erythropoietin stimulating agents    4.secondary hyperparathyroidism patient phosphorus is high.  we may need to add PhosLo.  I'll observe it and review after dialysis.    Thank you Dr. Fang for the referral will continue follow the patient in the hospital stay.         I discussed the patients findings and my recommendations with patient and family         This document has been electronically signed by Anca Merrill MD on August 22, 2017 10:31 PM

## 2017-08-22 NOTE — H&P
[facility] Medicine Admission      Date of Admission: 8/22/2017      Primary Care Physician: Luis Miguel Orourke MD      No chief complaint on file.      HPI:  Patient was sent from his nephrologist office Dr Merrill for urgent dialysis , patient  complains of feeling more tired and fatigued in the last few days, and worsening dyspnea on exertion and edema.   Patient denies fever or chills, headache , dizziness, visual changes,  chest pain or palpitations,or cough, abdominal pain , N/V/D , blood in the stool or urine or urinary symptoms,numbness or tingling in the extremities.    Past Medical History:   Past Medical History:   Diagnosis Date   • Arthritis    • Disease of thyroid gland    • Hypertension    • Kidney stone        Past Surgical History:   Past Surgical History:   Procedure Laterality Date   • ABDOMINAL AORTIC ANEURYSM REPAIR  08/2016    2 repairs done at same time in [place]   • ARTERIOVENOUS FISTULA Left 07/05/2017    fistula placed in left forearm   • TESTICLE TORSION REPAIR N/A 1964    Pt not sure which side       Family History:   Family History   Problem Relation Age of Onset   • Heart failure Father        Social History:   Social History     Social History   • Marital status:      Spouse name: N/A   • Number of children: N/A   • Years of education: N/A     Social History Main Topics   • Smoking status: Never Smoker   • Smokeless tobacco: Never Used   • Alcohol use No   • Drug use: No   • Sexual activity: Yes     Partners: Female     Other Topics Concern   • Not on file     Social History Narrative       Allergies:   Allergies   Allergen Reactions   • Morphine Nausea And Vomiting   • Tape Rash     Just plastic tape       Medications:   Prior to Admission medications    Medication Sig Start Date End Date Taking? Authorizing Provider   acetaminophen (TYLENOL) 650 MG 8 hr tablet Take 650 mg by mouth Every 8 (Eight) Hours As Needed for Mild Pain .   Yes  Historical Provider, MD   allopurinol (ZYLOPRIM) 100 MG tablet Take 100 mg by mouth.   Yes Nurse [facility], RN   amLODIPine (NORVASC) 5 MG tablet Take 5 mg by mouth Daily.   Yes Historical Provider, MD   bumetanide (BUMEX) 2 MG tablet Take 2 mg by mouth Daily.   Yes Historical Provider, MD   CIMETIDINE ACID REDUCER PO Take  by mouth 2 (Two) Times a Day.   Yes Nurse [facility], RN   CO ENZYME Q-10 PO Take 100 mg by mouth Daily.   Yes Nurse Berto Toro, RN   cyclobenzaprine (FLEXERIL) 10 MG tablet Take 10 mg by mouth 3 (Three) Times a Day As Needed.   Yes Nurse [facility], RN   DOCUSATE SODIUM PO Take  by mouth Daily.   Yes Nurse Berto Toro, RN   levothyroxine (SYNTHROID, LEVOTHROID) 50 MCG tablet Take 50 mcg by mouth.   Yes Nurse [facility], RN   metoprolol succinate XL (TOPROL-XL) 50 MG 24 hr tablet Take 100 mg by mouth 2 (Two) Times a Day.   Yes Nurse [facility], RN   Omega-3 Fatty Acids (FISH OIL) 1000 MG capsule capsule Take 1,200 mg by mouth Every Night.   Yes Nurse [facility], RN   rosuvastatin (CRESTOR) 10 MG tablet Take 10 mg by mouth.   Yes Nurse Berto Toro, RN   albuterol (PROVENTIL HFA;VENTOLIN HFA) 108 (90 BASE) MCG/ACT inhaler Inhale 2 puffs Every 4 (Four) Hours As Needed for wheezing or shortness of air. 2/1/17   DELORIS Lang   benzonatate (TESSALON) 100 MG capsule Take 2 capsules by mouth 3 (Three) Times a Day As Needed for cough. 2/1/17   DELORIS Lang   DHA-EPA-VITAMIN E PO Take  by mouth.    Nurse [facility], RN   Diphenhydramine-APAP, sleep, (ACETAMINOPHEN PM EX ST PO) Take  by mouth.    Nurse [facility], RN   fluticasone (FLONASE) 50 MCG/ACT nasal spray 1 spray into each nostril.    Nurse Berto Toro, RN   guaiFENesin (MUCINEX) 600 MG 12 hr tablet Take 2 tablets by mouth 2 (Two) Times a Day. 2/1/17   DELORIS Lang   minoxidil (LONITEN) 10 MG tablet Take 10 mg by mouth 2 (Two) Times a Day.    Nurse  [facility], RN   Multiple Vitamins-Minerals (MULTIVITAMIN ADULT PO) Take 1 tablet by mouth Daily.    Nurse [facility], RN   levoFLOXacin (LEVAQUIN) 250 MG tablet 1 tablet every 48 hous for 5 doses 2/1/17 8/22/17  DELORIS Lang       Review of Systems:    -Constitutional: weakness  -HENT: Negative for congestion, ear discharge, ear pain, hearing loss, rhinorrhea, sneezing, sore throat and trouble swallowing.    Eyes: Negative for photophobia, pain, discharge and visual disturbance.   -Respiratory:dsypnea.    -Cardiovascular: Negative for chest pain and palpitations.   -Gastrointestinal: Negative for abdominal pain, blood in stool, diarrhea, nausea and vomiting.   -Endocrine: Negative for polydipsia and polyuria.   -Genitourinary: Negative for difficulty urinating, dysuria, frequency, hematuria and urgency.   -Skin: Negative for rash.   -Neurological: Negative for dizziness, syncope, weakness, light-headedness, numbness and headaches.   -Psychiatric/Behavioral: Negative for suicidal ideas.     Otherwise complete ROS is negative except as mentioned above.    Physical Exam:    Physical Exam          -General:Patient is oriented to person, place, and time. Patient appears well-developed and well-nourished. No distress.   -HEENT: Head: Normocephalic and atraumatic. Right Ear: External ear normal. Left Ear: External ear normal. Nose: Nose normal. Mouth/Throat: Oropharynx is clear and moist.   Eyes: Conjunctivae and EOM are normal. Pupils are equal, round, and reactive to light. Right eye exhibits no discharge. Left eye exhibits no discharge.   Neck: Normal range of motion. Neck supple. No JVD present. No tracheal deviation present. No thyromegaly present.   -CVS: Normal rate, regular rhythm, normal heart sounds and intact distal pulses.  Exam reveals no gallop and no friction rub.    No murmur heard.  -Pulmonary: Effort normal and breath sounds normal. No stridor. No respiratory distress. He has  no wheezes. No rales or tenderness.   -Abdominal: Soft, Bowel sounds are normal. No distension and no mass. There is no tenderness. There is no rebound and no guarding. No hernia.   -Musc: No Cyanosis clubbing . 1+ pitting edema b/l.  -Lymph: No cervical adenopathy.   -Neuro: patient is alert and oriented to person, place, and time.Patient has normal reflexes. No cranial nerve deficit. Patient exhibits normal muscle tone , strength and sensation bilaterally. Coordination normal.   -Skin: Skin is warm. No rash noted. Patient is not diaphoretic. No erythema. No pallor.   -Psych: Patient  has a normal mood and affect. behavior is normal. Judgment and thought content normal. Denies suicidal ideations or plans.    Nursing note and vitals reviewed.    Results Reviewed:  I have personally reviewed current lab, radiology, and data and agree with results.  Lab Results (last 24 hours)     Procedure Component Value Units Date/Time    Renal Function Panel [[id number]] Collected:  08/22/17 1220    Specimen:  Blood Updated:  08/22/17 1234    Extra Tubes [[id number]] Collected:  08/22/17 1230    Specimen:  Blood from Blood, Venous Line Updated:  08/22/17 1236    Narrative:       The following orders were created for panel order Extra Tubes.  Procedure                               Abnormality         Status                     ---------                               -----------         ------                     Lavender Top[[id number]]                                     In process                   Please view results for these tests on the individual orders.    Lavender Top [[id number]] Collected:  08/22/17 1230    Specimen:  Blood Updated:  08/22/17 1236        Imaging Results (last 24 hours)     ** No results found for the last 24 hours. **          Assessment/Plan         Hospital Problem List     ESRD (end stage renal disease)          Assessment / Plan  --ESRD  Patient sent from nephrologist office Dr Renteria for urgent  hemodialisis and worsening Cr and worsening symptoms  HD to be done today and tomorrow  Labs pending    --CKD  Worsening   as above    --HTN  norvasc , metoprlol     --Gout  allopurinol     --Hypothyrodisim  Continue synthroid    --dyslipdemia    --GERD      I discussed the patients findings and my recommendations with the patient, and the patient verbalized understanding of the plan, risks and benefits of the plan.    This document has been electronically signed by Shad Fang MD on August 22, 2017 12:55 PM

## 2017-08-23 ENCOUNTER — APPOINTMENT (OUTPATIENT)
Dept: ULTRASOUND IMAGING | Facility: HOSPITAL | Age: 67
End: 2017-08-23

## 2017-08-23 ENCOUNTER — APPOINTMENT (OUTPATIENT)
Dept: GENERAL RADIOLOGY | Facility: HOSPITAL | Age: 67
End: 2017-08-23

## 2017-08-23 LAB
ALBUMIN SERPL-MCNC: 3.9 G/DL (ref 3.4–4.8)
ALBUMIN/GLOB SERPL: 1.2 G/DL (ref 1.1–1.8)
ALP SERPL-CCNC: 46 U/L (ref 38–126)
ALT SERPL W P-5'-P-CCNC: 33 U/L (ref 21–72)
ANION GAP SERPL CALCULATED.3IONS-SCNC: 13 MMOL/L (ref 5–15)
AST SERPL-CCNC: 19 U/L (ref 17–59)
BASOPHILS # BLD AUTO: 0.05 10*3/MM3 (ref 0–0.2)
BASOPHILS NFR BLD AUTO: 0.7 % (ref 0–2)
BILIRUB SERPL-MCNC: 0.4 MG/DL (ref 0.2–1.3)
BUN BLD-MCNC: 58 MG/DL (ref 7–21)
BUN/CREAT SERPL: 9 (ref 7–25)
CALCIUM SPEC-SCNC: 8.9 MG/DL (ref 8.4–10.2)
CHLORIDE SERPL-SCNC: 104 MMOL/L (ref 95–110)
CO2 SERPL-SCNC: 22 MMOL/L (ref 22–31)
CREAT BLD-MCNC: 6.43 MG/DL (ref 0.7–1.3)
DEPRECATED RDW RBC AUTO: 48.2 FL (ref 35.1–43.9)
EOSINOPHIL # BLD AUTO: 0.22 10*3/MM3 (ref 0–0.7)
EOSINOPHIL NFR BLD AUTO: 3.3 % (ref 0–7)
ERYTHROCYTE [DISTWIDTH] IN BLOOD BY AUTOMATED COUNT: 14.3 % (ref 11.5–14.5)
GFR SERPL CREATININE-BSD FRML MDRD: 9 ML/MIN/1.73 (ref 60–113)
GLOBULIN UR ELPH-MCNC: 3.2 GM/DL (ref 2.3–3.5)
GLUCOSE BLD-MCNC: 106 MG/DL (ref 60–100)
GLUCOSE BLDC GLUCOMTR-MCNC: 114 MG/DL (ref 70–130)
HBV SURFACE AG SERPL QL IA: NEGATIVE
HCT VFR BLD AUTO: 29 % (ref 39–49)
HGB BLD-MCNC: 9.6 G/DL (ref 13.7–17.3)
IMM GRANULOCYTES # BLD: 0.03 10*3/MM3 (ref 0–0.02)
IMM GRANULOCYTES NFR BLD: 0.4 % (ref 0–0.5)
LYMPHOCYTES # BLD AUTO: 1.45 10*3/MM3 (ref 0.6–4.2)
LYMPHOCYTES NFR BLD AUTO: 21.6 % (ref 10–50)
MCH RBC QN AUTO: 30.8 PG (ref 26.5–34)
MCHC RBC AUTO-ENTMCNC: 33.1 G/DL (ref 31.5–36.3)
MCV RBC AUTO: 92.9 FL (ref 80–98)
MONOCYTES # BLD AUTO: 0.49 10*3/MM3 (ref 0–0.9)
MONOCYTES NFR BLD AUTO: 7.3 % (ref 0–12)
NEUTROPHILS # BLD AUTO: 4.46 10*3/MM3 (ref 2–8.6)
NEUTROPHILS NFR BLD AUTO: 66.7 % (ref 37–80)
NRBC BLD MANUAL-RTO: 0 /100 WBC (ref 0–0)
PLATELET # BLD AUTO: 193 10*3/MM3 (ref 150–450)
PMV BLD AUTO: 11.4 FL (ref 8–12)
POTASSIUM BLD-SCNC: 4.4 MMOL/L (ref 3.5–5.1)
PROT SERPL-MCNC: 7.1 G/DL (ref 6.3–8.6)
RBC # BLD AUTO: 3.12 10*6/MM3 (ref 4.37–5.74)
SODIUM BLD-SCNC: 139 MMOL/L (ref 137–145)
WBC NRBC COR # BLD: 6.7 10*3/MM3 (ref 3.2–9.8)
WHOLE BLOOD HOLD SPECIMEN: NORMAL

## 2017-08-23 PROCEDURE — B244ZZZ ULTRASONOGRAPHY OF RIGHT HEART: ICD-10-PCS | Performed by: THORACIC SURGERY (CARDIOTHORACIC VASCULAR SURGERY)

## 2017-08-23 PROCEDURE — 25010000002 MIDAZOLAM PER 1 MG: Performed by: THORACIC SURGERY (CARDIOTHORACIC VASCULAR SURGERY)

## 2017-08-23 PROCEDURE — C1894 INTRO/SHEATH, NON-LASER: HCPCS | Performed by: THORACIC SURGERY (CARDIOTHORACIC VASCULAR SURGERY)

## 2017-08-23 PROCEDURE — 87340 HEPATITIS B SURFACE AG IA: CPT | Performed by: INTERNAL MEDICINE

## 2017-08-23 PROCEDURE — 93005 ELECTROCARDIOGRAM TRACING: CPT | Performed by: FAMILY MEDICINE

## 2017-08-23 PROCEDURE — 77001 FLUOROGUIDE FOR VEIN DEVICE: CPT | Performed by: THORACIC SURGERY (CARDIOTHORACIC VASCULAR SURGERY)

## 2017-08-23 PROCEDURE — 36558 INSERT TUNNELED CV CATH: CPT | Performed by: THORACIC SURGERY (CARDIOTHORACIC VASCULAR SURGERY)

## 2017-08-23 PROCEDURE — 36556 INSERT NON-TUNNEL CV CATH: CPT | Performed by: THORACIC SURGERY (CARDIOTHORACIC VASCULAR SURGERY)

## 2017-08-23 PROCEDURE — 76937 US GUIDE VASCULAR ACCESS: CPT

## 2017-08-23 PROCEDURE — 25010000002 FENTANYL CITRATE (PF) 100 MCG/2ML SOLUTION: Performed by: THORACIC SURGERY (CARDIOTHORACIC VASCULAR SURGERY)

## 2017-08-23 PROCEDURE — 25010000002 HYDROMORPHONE PER 4 MG: Performed by: FAMILY MEDICINE

## 2017-08-23 PROCEDURE — 5A1D60Z PERFORMANCE OF URINARY FILTRATION, MULTIPLE: ICD-10-PCS | Performed by: FAMILY MEDICINE

## 2017-08-23 PROCEDURE — 80053 COMPREHEN METABOLIC PANEL: CPT | Performed by: FAMILY MEDICINE

## 2017-08-23 PROCEDURE — 82962 GLUCOSE BLOOD TEST: CPT

## 2017-08-23 PROCEDURE — C1750 CATH, HEMODIALYSIS,LONG-TERM: HCPCS | Performed by: THORACIC SURGERY (CARDIOTHORACIC VASCULAR SURGERY)

## 2017-08-23 PROCEDURE — 85025 COMPLETE CBC W/AUTO DIFF WBC: CPT | Performed by: FAMILY MEDICINE

## 2017-08-23 PROCEDURE — 02H633Z INSERTION OF INFUSION DEVICE INTO RIGHT ATRIUM, PERCUTANEOUS APPROACH: ICD-10-PCS | Performed by: THORACIC SURGERY (CARDIOTHORACIC VASCULAR SURGERY)

## 2017-08-23 PROCEDURE — 93010 ELECTROCARDIOGRAM REPORT: CPT | Performed by: INTERNAL MEDICINE

## 2017-08-23 RX ORDER — ACETAMINOPHEN AND CODEINE PHOSPHATE 300; 30 MG/1; MG/1
1 TABLET ORAL EVERY 8 HOURS PRN
Status: DISCONTINUED | OUTPATIENT
Start: 2017-08-23 | End: 2017-08-25 | Stop reason: HOSPADM

## 2017-08-23 RX ORDER — FENTANYL CITRATE 50 UG/ML
INJECTION, SOLUTION INTRAMUSCULAR; INTRAVENOUS AS NEEDED
Status: DISCONTINUED | OUTPATIENT
Start: 2017-08-23 | End: 2017-08-23 | Stop reason: HOSPADM

## 2017-08-23 RX ORDER — MIDAZOLAM HYDROCHLORIDE 1 MG/ML
INJECTION INTRAMUSCULAR; INTRAVENOUS AS NEEDED
Status: DISCONTINUED | OUTPATIENT
Start: 2017-08-23 | End: 2017-08-23 | Stop reason: HOSPADM

## 2017-08-23 RX ADMIN — LIDOCAINE AND PRILOCAINE: 25; 25 CREAM TOPICAL at 08:08

## 2017-08-23 RX ADMIN — ROSUVASTATIN CALCIUM 10 MG: 10 TABLET, FILM COATED ORAL at 21:20

## 2017-08-23 RX ADMIN — Medication 5 MG: at 08:07

## 2017-08-23 RX ADMIN — ACETAMINOPHEN AND CODEINE PHOSPHATE 1 TABLET: 300; 30 TABLET ORAL at 15:02

## 2017-08-23 RX ADMIN — Medication 10 ML: at 08:09

## 2017-08-23 RX ADMIN — FAMOTIDINE 40 MG: 40 TABLET ORAL at 08:07

## 2017-08-23 RX ADMIN — AMLODIPINE BESYLATE 5 MG: 5 TABLET ORAL at 08:07

## 2017-08-23 RX ADMIN — DOCUSATE SODIUM 100 MG: 100 CAPSULE, LIQUID FILLED ORAL at 17:31

## 2017-08-23 RX ADMIN — LEVOTHYROXINE SODIUM 50 MCG: 50 TABLET ORAL at 05:47

## 2017-08-23 RX ADMIN — Medication 10 ML: at 17:52

## 2017-08-23 RX ADMIN — HYDROMORPHONE HYDROCHLORIDE 0.5 MG: 1 INJECTION, SOLUTION INTRAMUSCULAR; INTRAVENOUS; SUBCUTANEOUS at 17:52

## 2017-08-23 RX ADMIN — DOCUSATE SODIUM 100 MG: 100 CAPSULE, LIQUID FILLED ORAL at 08:07

## 2017-08-23 RX ADMIN — ALLOPURINOL 100 MG: 100 TABLET ORAL at 08:07

## 2017-08-23 RX ADMIN — BUMETANIDE 2 MG: 1 TABLET ORAL at 08:07

## 2017-08-23 RX ADMIN — METOPROLOL SUCCINATE 100 MG: 100 TABLET, EXTENDED RELEASE ORAL at 08:07

## 2017-08-23 NOTE — PLAN OF CARE
Problem: Patient Care Overview (Adult)  Goal: Plan of Care Review  Outcome: Ongoing (interventions implemented as appropriate)    08/23/17 0422   Coping/Psychosocial Response Interventions   Plan Of Care Reviewed With patient   Patient Care Overview   Progress improving   Outcome Evaluation   Outcome Summary/Follow up Plan pt c/o pain from unsuccessful access attempts on dialysis fistula; ice packs placed on area; vital signs stable; will continue to monitor       Goal: Adult Individualization and Mutuality  Outcome: Ongoing (interventions implemented as appropriate)  Goal: Discharge Needs Assessment  Outcome: Ongoing (interventions implemented as appropriate)    Problem: Chronic Kidney Disease/End Stage Renal Disease (Adult)  Goal: Signs and Symptoms of Listed Potential Problems Will be Absent or Manageable (Chronic Kidney Disease/End Stage Renal Disease)  Outcome: Ongoing (interventions implemented as appropriate)  Goal: Signs and Symptoms of Listed Potential Problems Will be Absent or Manageable (Chronic Kidney Disease/End Stage Renal Disease)  Outcome: Ongoing (interventions implemented as appropriate)

## 2017-08-23 NOTE — INTERVAL H&P NOTE
H&P reviewed. The patient was examined and there are no changes to the H&P.  Detailed discussion with Mr Austin regarding situation, options and plans.  CHUCKIE/CKD requiring long term hemodialysis.  Fistula in place, not mature.  Placement of temporary tunnelled hemodialysis catheter with cuff is advisable.      Risks including, but not limited to Mortality, Major morbidity <1%, bleeding, transfusion, infection, pneumothorax, blood vessel injury.  Benefits: access for hemodialysis.  Options:  direct cannulation, temporary and tunnel catheter discussed.  Understands and wishes to proceed.      Placement  tunnel catheter.  Local, IV sedation.  8/23/2017.

## 2017-08-23 NOTE — PROGRESS NOTES
[facility] Medicine Services  INPATIENT PROGRESS NOTE    Length of Stay: 1  Date of Admission: 8/22/2017  Primary Care Physician: Luis Miguel Orourke MD    Subjective     No chief complaint on file.      HPI:  Patient was seen and examined. Patient just finished his first round of HD. Patient still complains of dyspnea mostly on exertion and edema. Appetite is slightly decreased and complains of nausea and pain the site of the tunnel. Denies any chest pain or palpitation V/D or urinary symptoms.    Review of Systems     -Constitutional:decreased appetite.  -Respiratory: dyspnea on exertion  -Cardiovascular: Negative for chest pain and palpitations. postiive edema   -Gastrointestinal: Negative for abdominal pain, blood in stool, diarrhea,  and vomiting.   -Genitourinary: Negative for dysuria, frequency and hematuria.   -Neurological: Negative for dizziness, syncope, weakness, light-headedness, numbness and headaches.     All pertinent negatives and positives are as above. All other systems have been reviewed and are negative unless otherwise stated.   Objective    Physical exam    Temp:  [98 °F (36.7 °C)-98.4 °F (36.9 °C)] 98.4 °F (36.9 °C)  Heart Rate:  [68-80] 76  Resp:  [18] 18  BP: (137-142)/(71-81) 139/72    -Constitutional: Patient is AAO x 3. Patient appears well-developed and well-nourished.   -CVS: Normal rate, regular rhythm, normal heart sounds and intact distal pulses.  Exam reveals no gallop and no friction rub.  No murmur heard.  -Pulm: Effort normal and breath sounds normal. No respiratory distress. No wheezes, rales or ronchi.  -Abdominal: Soft. Bowel sounds are normal. No distension, no tenderness. There is no rebound and no guarding. No hernia.   -Musculoskeletal: No Cyanosis clubbing . Positive 1+ bl/ pitting edema    Results Review:    Laboratory Data:     Results from last 7 days  Lab Units 08/23/17  0830 08/22/17  1220 08/18/17  1101   SODIUM mmol/L 139 137   137 138   POTASSIUM mmol/L 4.4 4.6  4.6 4.7   CHLORIDE mmol/L 104 104  104 103   CO2 mmol/L 22.0 21.0*  21.0* 23.0   BUN mg/dL 58* 61*  60* 63*   CREATININE mg/dL 6.43* 6.10*  6.10* 6.22*   GLUCOSE mg/dL 106* 88  86 99   CALCIUM mg/dL 8.9 9.1  9.0 9.5   BILIRUBIN mg/dL 0.4 0.6  --    ALK PHOS U/L 46 45  --    ALT (SGPT) U/L 33 39  --    AST (SGOT) U/L 19 21  --    ANION GAP mmol/L 13.0 12.0  12.0 12.0     Estimated Creatinine Clearance: 14 mL/min (by C-G formula based on Cr of 6.43).    Results from last 7 days  Lab Units 08/22/17  1220 08/18/17  1101   PHOSPHORUS mg/dL 6.0* 6.4*           Results from last 7 days  Lab Units 08/23/17  0830 08/22/17  1230 08/18/17  1101   WBC 10*3/mm3 6.70 6.38  --    HEMOGLOBIN g/dL 9.6* 10.3* 10.9*   HEMATOCRIT % 29.0* 29.9* 31.5*   PLATELETS 10*3/mm3 193 209  --            Culture Data:   No results found for: BLOODCX  Urine Culture   Date Value Ref Range Status   08/22/2017 Culture in progress  Preliminary     No results found for: RESPCX  No results found for: WOUNDCX  No results found for: STOOLCX  No components found for: BODYFLD    Micobiology                    Urine Culture   Date Value Ref Range Status   08/22/2017 Culture in progress  Preliminary          Radiology Data:   Imaging Results (all)     Procedure Component Value Units Date/Time    XR Chest PA & Lateral [[id number]] Collected:  08/22/17 1519     Updated:  08/22/17 1540    Narrative:         EXAM:          Radiograph(s), Chest   VIEWS:    PA / Lat ; 2       DATE/TIME:  8/22/2017 3:38 PM CDT               INDICATION:    dyspnea           COMPARISON:  CXR: 2/1/17          FINDINGS:             - lines/tubes:    none     - cardiac:         size within normal limits         - mediastinum: contour within normal limits         - lungs:         no focal air space process, pulmonary  interstitial edema, nodule(s)/mass             - pleura:         no evidence of  fluid                  - osseous:          unremarkable for age                  - misc.:         Impression:       CONCLUSION:        1. Negative examination.                                     Electronically signed by:  BIN Willis MD  8/22/2017 3:38  PM CDT Workstation: [id number]    US Guided Vascular Access [[id number]] Resulted:  08/23/17 1045     Updated:  08/23/17 1045    Narrative:       This procedure was auto-finalized with no dictation required.    XR Chest Post CVA Port [[id number]] Collected:  08/23/17 1053     Updated:  08/23/17 1144    Narrative:       [facility] [place]    Patient Name: AILYN DUMONT    ORDERING: ALMA COX     ATTENDING: DAYTON DUBOSE     REFERRING: ALMA COX    -----------------------    PROCEDURE: Portable chest x-ray    TECHNIQUE: Single AP view of the chest    COMPARISON: 8/22/2017    HISTORY: tunnel cath, N18.6 End stage renal disease    FINDINGS:     Life-support devices: There is a Central Venous access Line  terminating in the Superior Vena Cava    Lungs/pleura: Clear, no pneumothorax.    Heart, hilar and mediastinal structures:  Normal accounting for  projection and technique      Impression:       CONCLUSION:  No pneumothorax   There is a Central Venous access Line terminating in the Inferior  Superior Vena Cava    Electronically signed by:  Aubrey Shirley MD  8/23/2017 11:43 AM  CDT Workstation: [id number]          I have reviewed the patient current medications.   Assessment/Plan     Hospital Problem List     ESRD (end stage renal disease)          Assessment / Plan    --Assessment / Plan  --ESRD  1st HD was done today after obtaining tunnel cath today  Cr this AM 6.4  Plan to repeat dialysis tomorrow     --Dyspnea on exertion  Trop , EKG and CXR WNL  Likely secondary to ESRD     --CKD  Worsening   as above     --HTN  norvasc , metoprlol , minoxidil      --Gout  allopurinol      --Hypothyrodisim  Continue synthroid     --Anemia   Likely secondary to CKD  Stable hb  10.9    --dyslipdemia     --GERD       This document has been electronically signed by Shad Fang MD on August 23, 2017 2:17 PM

## 2017-08-23 NOTE — PLAN OF CARE
Problem: Patient Care Overview (Adult)  Goal: Plan of Care Review  Outcome: Ongoing (interventions implemented as appropriate)    08/23/17 4070   Coping/Psychosocial Response Interventions   Plan Of Care Reviewed With patient   Patient Care Overview   Progress no change   Outcome Evaluation   Outcome Summary/Follow up Plan Pt had chest port placed for dialysis; Pt has pain in left arm due to unsuccessful attempt to access fistula in dialysis; ice pack used to address pain and swelling       Goal: Adult Individualization and Mutuality  Outcome: Ongoing (interventions implemented as appropriate)  Goal: Discharge Needs Assessment  Outcome: Ongoing (interventions implemented as appropriate)    Problem: Chronic Kidney Disease/End Stage Renal Disease (Adult)  Goal: Signs and Symptoms of Listed Potential Problems Will be Absent or Manageable (Chronic Kidney Disease/End Stage Renal Disease)  Outcome: Ongoing (interventions implemented as appropriate)

## 2017-08-23 NOTE — PROGRESS NOTES
"[facility]  [address] [place] [address]. [address]  T - [phone]  F - [phone]     Progress Note          PATIENT  DEMOGRAPHICS   PATIENT NAME: Jesús Austin                      PHYSICIAN: Anca Merrill MD  : 1950  MRN: [id number]   LOS: 1 day    Patient Care Team:  Luis Miguel Orourke MD as PCP - General (Family Medicine)  Juan Pro MD as PCP - Claims Attributed  Subjective   SUBJECTIVE   Seen during dialysis still unable to cannulate and has some hematoma         Objective   OBJECTIVE   Vital Signs  Temp:  [97.4 °F (36.3 °C)-98.4 °F (36.9 °C)] 98.4 °F (36.9 °C)  Heart Rate:  [68-80] 72  Resp:  [18] 18  BP: (137-142)/(71-81) 137/71    Flowsheet Rows         First Filed Value    Admission Height  71\" (180.3 cm) Documented at 2017 1514    Admission Weight  244 lb (111 kg) Documented at 2017 1514           I/O last 3 completed shifts:  In: 600 [P.O.:600]  Out: 2715 [Urine:2715]    PHYSICAL EXAM    Physical Exam   Constitutional: He is oriented to person, place, and time. He appears well-developed.   HENT:   Head: Normocephalic.   Eyes: Pupils are equal, round, and reactive to light.   Cardiovascular: Normal rate, regular rhythm and normal heart sounds.    Pulmonary/Chest: Effort normal and breath sounds normal.   Abdominal: Soft. Bowel sounds are normal.   Musculoskeletal: He exhibits edema.   Neurological: He is alert and oriented to person, place, and time.       RESULTS   Results Review:      Results from last 7 days  Lab Units 17  0830 17  1220 17  1101   SODIUM mmol/L 139 137  137 138   POTASSIUM mmol/L 4.4 4.6  4.6 4.7   CHLORIDE mmol/L 104 104  104 103   CO2 mmol/L 22.0 21.0*  21.0* 23.0   BUN mg/dL 58* 61*  60* 63*   CREATININE mg/dL 6.43* 6.10*  6.10* 6.22*   CALCIUM mg/dL 8.9 9.1  9.0 9.5   BILIRUBIN mg/dL 0.4 0.6  --    ALK PHOS U/L 46 45  --    ALT (SGPT) U/L 33 39  --    AST (SGOT) U/L 19 21  --    GLUCOSE mg/dL 106* " 88  86 99       Estimated Creatinine Clearance: 14 mL/min (by C-G formula based on Cr of 6.43).      Results from last 7 days  Lab Units 08/22/17  1220 08/18/17  1101   PHOSPHORUS mg/dL 6.0* 6.4*               Results from last 7 days  Lab Units 08/23/17  0830 08/22/17  1230 08/18/17  1101   WBC 10*3/mm3 6.70 6.38  --    HEMOGLOBIN g/dL 9.6* 10.3* 10.9*   PLATELETS 10*3/mm3 193 209  --                Imaging Results (last 24 hours)     Procedure Component Value Units Date/Time    XR Chest PA & Lateral [[id number]] Collected:  08/22/17 1519     Updated:  08/22/17 1540    Narrative:         EXAM:          Radiograph(s), Chest   VIEWS:    PA / Lat ; 2       DATE/TIME:  8/22/2017 3:38 PM CDT               INDICATION:    dyspnea           COMPARISON:  CXR: 2/1/17          FINDINGS:             - lines/tubes:    none     - cardiac:         size within normal limits         - mediastinum: contour within normal limits         - lungs:         no focal air space process, pulmonary  interstitial edema, nodule(s)/mass             - pleura:         no evidence of  fluid                  - osseous:         unremarkable for age                  - misc.:         Impression:       CONCLUSION:        1. Negative examination.                                     Electronically signed by:  BIN Willis MD  8/22/2017 3:38  PM CDT Workstation: [id number]           MEDICATIONS      allopurinol 100 mg Oral Daily   amLODIPine 5 mg Oral Daily   bumetanide 2 mg Oral Daily   docusate sodium 100 mg Oral BID   famotidine 40 mg Oral Daily   fluticasone 1 spray Nasal Daily   levothyroxine 50 mcg Oral Q AM   metoprolol succinate  mg Oral Q24H   minoxidil 5 mg Oral Q24H   rosuvastatin 10 mg Oral Nightly          Assessment/Plan   ASSESSMENT / PLAN    Active Problems:    ESRD (end stage renal disease)    1.CKD 5 now worsening uremia with GFR of only 9.  Patient doesn't have any marked fluid overload but does have severe uremia and  shortness of breath on exertion.  we have attempted avf yesterday and today and unable to cannulate venous needle.    I have a long discussion with him and explain the need of tunnel cath and proceed with hd today after that. I have discussed with dr Shields. Will give fistula 3-4 weeks more to mature. He is not interested in transplant referral.     2.hypertension - his blood pressure is stable and I will keep metoprolol and amlodipine and minoxidil for now.  We may consider ACE inhibitor later     3.anemia of chronic kidney disease.  Patient hemoglobin is up to 10.9.  Continue with iron.  No plan for erythropoietin stimulating agents     4.secondary hyperparathyroidism patient phosphorus is high.  we may need to add PhosLo.  I'll observe it and review after dialysis.                This document has been electronically signed by Anca Merrill MD on August 23, 2017 9:26 AM

## 2017-08-24 LAB
ALBUMIN SERPL-MCNC: 3.9 G/DL (ref 3.4–4.8)
ALBUMIN/GLOB SERPL: 1.3 G/DL (ref 1.1–1.8)
ALP SERPL-CCNC: 43 U/L (ref 38–126)
ALT SERPL W P-5'-P-CCNC: 29 U/L (ref 21–72)
ANION GAP SERPL CALCULATED.3IONS-SCNC: 8 MMOL/L (ref 5–15)
AST SERPL-CCNC: 21 U/L (ref 17–59)
BACTERIA SPEC AEROBE CULT: NORMAL
BASOPHILS # BLD AUTO: 0.05 10*3/MM3 (ref 0–0.2)
BASOPHILS NFR BLD AUTO: 0.6 % (ref 0–2)
BILIRUB SERPL-MCNC: 0.5 MG/DL (ref 0.2–1.3)
BUN BLD-MCNC: 42 MG/DL (ref 7–21)
BUN/CREAT SERPL: 7.9 (ref 7–25)
CALCIUM SPEC-SCNC: 8.9 MG/DL (ref 8.4–10.2)
CHLORIDE SERPL-SCNC: 96 MMOL/L (ref 95–110)
CO2 SERPL-SCNC: 29 MMOL/L (ref 22–31)
CREAT BLD-MCNC: 5.3 MG/DL (ref 0.7–1.3)
DEPRECATED RDW RBC AUTO: 46.6 FL (ref 35.1–43.9)
EOSINOPHIL # BLD AUTO: 0.1 10*3/MM3 (ref 0–0.7)
EOSINOPHIL NFR BLD AUTO: 1.2 % (ref 0–7)
ERYTHROCYTE [DISTWIDTH] IN BLOOD BY AUTOMATED COUNT: 14 % (ref 11.5–14.5)
GFR SERPL CREATININE-BSD FRML MDRD: 11 ML/MIN/1.73 (ref 49–113)
GLOBULIN UR ELPH-MCNC: 3 GM/DL (ref 2.3–3.5)
GLUCOSE BLD-MCNC: 167 MG/DL (ref 60–100)
HCT VFR BLD AUTO: 27.5 % (ref 39–49)
HGB BLD-MCNC: 9.2 G/DL (ref 13.7–17.3)
IMM GRANULOCYTES # BLD: 0.02 10*3/MM3 (ref 0–0.02)
IMM GRANULOCYTES NFR BLD: 0.2 % (ref 0–0.5)
LYMPHOCYTES # BLD AUTO: 1.2 10*3/MM3 (ref 0.6–4.2)
LYMPHOCYTES NFR BLD AUTO: 14.2 % (ref 10–50)
MCH RBC QN AUTO: 30.8 PG (ref 26.5–34)
MCHC RBC AUTO-ENTMCNC: 33.5 G/DL (ref 31.5–36.3)
MCV RBC AUTO: 92 FL (ref 80–98)
MONOCYTES # BLD AUTO: 0.66 10*3/MM3 (ref 0–0.9)
MONOCYTES NFR BLD AUTO: 7.8 % (ref 0–12)
NEUTROPHILS # BLD AUTO: 6.45 10*3/MM3 (ref 2–8.6)
NEUTROPHILS NFR BLD AUTO: 76 % (ref 37–80)
NRBC BLD MANUAL-RTO: 0 /100 WBC (ref 0–0)
PLATELET # BLD AUTO: 182 10*3/MM3 (ref 150–450)
PMV BLD AUTO: 11.2 FL (ref 8–12)
POTASSIUM BLD-SCNC: 3.8 MMOL/L (ref 3.5–5.1)
PROT SERPL-MCNC: 6.9 G/DL (ref 6.3–8.6)
RBC # BLD AUTO: 2.99 10*6/MM3 (ref 4.37–5.74)
SODIUM BLD-SCNC: 133 MMOL/L (ref 137–145)
WBC NRBC COR # BLD: 8.48 10*3/MM3 (ref 3.2–9.8)

## 2017-08-24 PROCEDURE — 80053 COMPREHEN METABOLIC PANEL: CPT | Performed by: FAMILY MEDICINE

## 2017-08-24 PROCEDURE — 63510000001 EPOETIN ALFA PER 1000 UNITS: Performed by: INTERNAL MEDICINE

## 2017-08-24 PROCEDURE — 85025 COMPLETE CBC W/AUTO DIFF WBC: CPT | Performed by: FAMILY MEDICINE

## 2017-08-24 RX ADMIN — ACETAMINOPHEN AND CODEINE PHOSPHATE 1 TABLET: 300; 30 TABLET ORAL at 01:17

## 2017-08-24 RX ADMIN — LEVOTHYROXINE SODIUM 50 MCG: 50 TABLET ORAL at 06:30

## 2017-08-24 RX ADMIN — ALLOPURINOL 100 MG: 100 TABLET ORAL at 10:48

## 2017-08-24 RX ADMIN — BUMETANIDE 2 MG: 1 TABLET ORAL at 10:47

## 2017-08-24 RX ADMIN — ERYTHROPOIETIN 5000 UNITS: 10000 INJECTION, SOLUTION INTRAVENOUS; SUBCUTANEOUS at 11:52

## 2017-08-24 RX ADMIN — ROSUVASTATIN CALCIUM 10 MG: 10 TABLET, FILM COATED ORAL at 19:38

## 2017-08-24 RX ADMIN — FAMOTIDINE 40 MG: 40 TABLET ORAL at 10:48

## 2017-08-24 RX ADMIN — FLUTICASONE PROPIONATE 1 SPRAY: 50 SPRAY, METERED NASAL at 10:51

## 2017-08-24 RX ADMIN — DOCUSATE SODIUM 100 MG: 100 CAPSULE, LIQUID FILLED ORAL at 17:13

## 2017-08-24 RX ADMIN — ACETAMINOPHEN 650 MG: 325 TABLET ORAL at 11:02

## 2017-08-24 NOTE — PLAN OF CARE
Problem: Patient Care Overview (Adult)  Goal: Plan of Care Review  Outcome: Ongoing (interventions implemented as appropriate)    Problem: Chronic Kidney Disease/End Stage Renal Disease (Adult)  Goal: Signs and Symptoms of Listed Potential Problems Will be Absent or Manageable (Chronic Kidney Disease/End Stage Renal Disease)  Outcome: Outcome(s) achieved Date Met:  08/24/17  Goal: Signs and Symptoms of Listed Potential Problems Will be Absent or Manageable (Chronic Kidney Disease/End Stage Renal Disease)  Outcome: Ongoing (interventions implemented as appropriate)

## 2017-08-24 NOTE — PROGRESS NOTES
[facility] Medicine Services  INPATIENT PROGRESS NOTE    Length of Stay: 2  Date of Admission: 8/22/2017  Primary Care Physician: Luis Miguel Orourke MD    Subjective     No chief complaint on file.      HPI:  Patient was seen and examined. Patient just finished his second round of dialysis. Patient feels slight better today , but still complains of edema, dyspnea on exertion and pain at the sight of tunnel cath. Appetite is slightly decreased and complains of nausea and pain the site of the tunnel. Denies any chest pain or palpitation V/D or urinary symptoms.    Review of Systems     -Constitutional:decreased appetite.  -Respiratory: dyspnea on exertion  -Cardiovascular: Negative for chest pain and palpitations. postiive edema   -Gastrointestinal: Negative for abdominal pain, blood in stool, diarrhea,  and vomiting.   -Genitourinary: Negative for dysuria, frequency and hematuria.   -Neurological: Negative for dizziness, syncope, weakness, light-headedness, numbness and headaches.     All pertinent negatives and positives are as above. All other systems have been reviewed and are negative unless otherwise stated.   Objective    Physical exam    Temp:  [96.4 °F (35.8 °C)-98.9 °F (37.2 °C)] 98.9 °F (37.2 °C)  Heart Rate:  [68-98] 77  Resp:  [18-20] 18  BP: (124-162)/(63-77) 131/76    -Constitutional: Patient is AAO x 3. Patient appears well-developed and well-nourished.   -CVS: Normal rate, regular rhythm, normal heart sounds and intact distal pulses.  Exam reveals no gallop and no friction rub.  No murmur heard.  -Pulm: Effort normal and breath sounds normal. No respiratory distress. No wheezes, rales or ronchi.  -Abdominal: Soft. Bowel sounds are normal. No distension, no tenderness. There is no rebound and no guarding. No hernia.   -Musculoskeletal: No Cyanosis clubbing . Positive 1+ bl/ pitting edema    Results Review:    Laboratory Data:     Results from last 7 days  Lab  Units 08/24/17  0724 08/23/17  0830 08/22/17  1220   SODIUM mmol/L 133* 139 137  137   POTASSIUM mmol/L 3.8 4.4 4.6  4.6   CHLORIDE mmol/L 96 104 104  104   CO2 mmol/L 29.0 22.0 21.0*  21.0*   BUN mg/dL 42* 58* 61*  60*   CREATININE mg/dL 5.30* 6.43* 6.10*  6.10*   GLUCOSE mg/dL 167* 106* 88  86   CALCIUM mg/dL 8.9 8.9 9.1  9.0   BILIRUBIN mg/dL 0.5 0.4 0.6   ALK PHOS U/L 43 46 45   ALT (SGPT) U/L 29 33 39   AST (SGOT) U/L 21 19 21   ANION GAP mmol/L 8.0 13.0 12.0  12.0     Estimated Creatinine Clearance: 17 mL/min (by C-G formula based on Cr of 5.3).    Results from last 7 days  Lab Units 08/22/17  1220 08/18/17  1101   PHOSPHORUS mg/dL 6.0* 6.4*           Results from last 7 days  Lab Units 08/24/17  0724 08/23/17  0830 08/22/17  1230 08/18/17  1101   WBC 10*3/mm3 8.48 6.70 6.38  --    HEMOGLOBIN g/dL 9.2* 9.6* 10.3* 10.9*   HEMATOCRIT % 27.5* 29.0* 29.9* 31.5*   PLATELETS 10*3/mm3 182 193 209  --            Culture Data:   No results found for: BLOODCX  Urine Culture   Date Value Ref Range Status   08/22/2017 No growth at 24 hours  Final     No results found for: RESPCX  No results found for: WOUNDCX  No results found for: STOOLCX  No components found for: BODYFLD    Micobiology                    Urine Culture   Date Value Ref Range Status   08/22/2017 Culture in progress  Preliminary          Radiology Data:   Imaging Results (all)     Procedure Component Value Units Date/Time    XR Chest PA & Lateral [[id number]] Collected:  08/22/17 1519     Updated:  08/22/17 1540    Narrative:         EXAM:          Radiograph(s), Chest   VIEWS:    PA / Lat ; 2       DATE/TIME:  8/22/2017 3:38 PM CDT               INDICATION:    dyspnea           COMPARISON:  CXR: 2/1/17          FINDINGS:             - lines/tubes:    none     - cardiac:         size within normal limits         - mediastinum: contour within normal limits         - lungs:         no focal air space process, pulmonary  interstitial edema,  nodule(s)/mass             - pleura:         no evidence of  fluid                  - osseous:         unremarkable for age                  - misc.:         Impression:       CONCLUSION:        1. Negative examination.                                     Electronically signed by:  BIN Willis MD  8/22/2017 3:38  PM CDT Workstation: [id number]    US Guided Vascular Access [[id number]] Resulted:  08/23/17 1045     Updated:  08/23/17 1045    Narrative:       This procedure was auto-finalized with no dictation required.    XR Chest Post CVA Port [[id number]] Collected:  08/23/17 1053     Updated:  08/23/17 1144    Narrative:       [facility]    Patient Name: AILYN DUMONT    ORDERING: ALMA COX     ATTENDING: DAYTON DUBOSE     REFERRING: ALMA COX    -----------------------    PROCEDURE: Portable chest x-ray    TECHNIQUE: Single AP view of the chest    COMPARISON: 8/22/2017    HISTORY: tunnel cath, N18.6 End stage renal disease    FINDINGS:     Life-support devices: There is a Central Venous access Line  terminating in the Superior Vena Cava    Lungs/pleura: Clear, no pneumothorax.    Heart, hilar and mediastinal structures:  Normal accounting for  projection and technique      Impression:       CONCLUSION:  No pneumothorax   There is a Central Venous access Line terminating in the Inferior  Superior Vena Cava    Electronically signed by:  Aubrey Shirley MD  8/23/2017 11:43 AM  CDT Workstation: [id number]          I have reviewed the patient current medications.   Assessment/Plan     Hospital Problem List     ESRD (end stage renal disease)          Assessment / Plan    --Assessment / Plan  --ESRD  Pt dialyzed yesterday and today   Cr improving 6.4--5.3  Plan to repeat dialysis tomorrow     --Dyspnea on exertion  Trop , EKG and CXR WNL  Likely secondary to ESRD   imporving    --CKD  Worsening   as above     --HTN  norvasc , metoprlol , minoxidil      --Gout  allopurinol       --Hypothyrodisim  Continue synthroid     --Anemia   Likely secondary to CKD  Stable hb 10.9    --dyslipdemia     --GERD       This document has been electronically signed by Shad Fang MD on August 24, 2017 10:47 AM

## 2017-08-24 NOTE — DISCHARGE PLACEMENT REQUEST
"Ailyn Austin (66 y.o. Male)     Date of Birth Social Security Number Address Home Phone MRN    1950  [facility] [place] [id number] [phone] [id number]    Islam Marital Status          Lutheran        Admission Date Admission Type Admitting Provider Attending Provider Department, Room/Bed    8/22/17 Urgent Shad Fang MD Bleibel, Fadi, MD [address], [address]    Discharge Date Discharge Disposition Discharge Destination                      Attending Provider: Shad Fang MD     Allergies:  Morphine, Tape    Isolation:  None   Infection:  None   Code Status:  FULL    Ht:  71\" (180.3 cm)   Wt:  246 lb 6 oz (112 kg)    Admission Cmt:  None   Principal Problem:  None                Active Insurance as of 8/22/2017     Primary Coverage     Payor Plan Insurance Group Employer/Plan Group    MEDICARE MEDICARE A & B      Payor Plan Address Payor Plan Phone Number Effective From Effective To    [address] [phone] 11/1/2013     [place] [address]       Subscriber Name Subscriber Birth Date Member ID       AILYN AUSTIN 1950 [id number]           Secondary Coverage     Payor Plan Insurance Group Employer/Plan Group    [facility] [employer] EMPL[employer]EE [id number]     Payor Plan Address Payor Plan Phone Number Effective From Effective To    [address] [phone] 12/1/2016     [place] [address]       Subscriber Name Subscriber Birth Date Member ID       AILYN AUSTIN 1950 [id number]                 Emergency Contacts      (Rel.) Home Phone Work Phone Mobile Phone    Ana Austin (Spouse) [phone] -- --            Hospital Medications (active)       Dose Frequency Start End    acetaminophen (TYLENOL) tablet 650 mg 650 mg Every 4 Hours PRN 8/22/2017     Sig - Route: Take 2 tablets by mouth Every 4 (Four) Hours As Needed for Mild Pain . - Oral    acetaminophen-codeine (TYLENOL #3) 300-30 MG per tablet 1 tablet " 1 tablet Every 8 Hours PRN 8/23/2017 9/2/2017    Sig - Route: Take 1 tablet by mouth Every 8 (Eight) Hours As Needed for Moderate Pain . - Oral    albuterol (PROVENTIL) nebulizer solution 0.083% 2.5 mg/3mL 2.5 mg Every 6 Hours PRN 8/22/2017     Sig - Route: Take 2.5 mg by nebulization Every 6 (Six) Hours As Needed for Wheezing. - Nebulization    allopurinol (ZYLOPRIM) tablet 100 mg 100 mg Daily 8/22/2017     Sig - Route: Take 1 tablet by mouth Daily. - Oral    amLODIPine (NORVASC) tablet 5 mg 5 mg Daily 8/22/2017     Sig - Route: Take 1 tablet by mouth Daily. - Oral    benzonatate (TESSALON) capsule 200 mg 200 mg 3 Times Daily PRN 8/22/2017     Sig - Route: Take 2 capsules by mouth 3 (Three) Times a Day As Needed for Cough. - Oral    bumetanide (BUMEX) tablet 2 mg 2 mg Daily 8/22/2017     Sig - Route: Take 2 tablets by mouth Daily. - Oral    docusate sodium (COLACE) capsule 100 mg 100 mg 2 Times Daily 8/22/2017     Sig - Route: Take 1 capsule by mouth 2 (Two) Times a Day. - Oral    famotidine (PEPCID) tablet 40 mg 40 mg Daily 8/22/2017     Sig - Route: Take 1 tablet by mouth Daily. - Oral    fluticasone (FLONASE) 50 MCG/ACT nasal spray 1 spray 1 spray Daily 8/22/2017     Sig - Route: 1 spray into each nostril Daily. - Nasal    heparin (porcine) injection 2,000 Units 2,000 Units As Needed 8/23/2017     Sig - Route: Infuse 2 mL into a venous catheter As Needed (For Dialysis Catheter Care.). - Intravenous    heparin (porcine) injection 2,000 Units 2,000 Units As Needed 8/23/2017     Sig - Route: Infuse 2 mL into a venous catheter As Needed (For Dialysis Catheter Care.). - Intravenous    HYDROmorphone (DILAUDID) injection 0.5 mg 0.5 mg Once 8/23/2017 8/23/2017    Sig - Route: Infuse 0.5 mL into a venous catheter 1 (One) Time. - Intravenous    levothyroxine (SYNTHROID, LEVOTHROID) tablet 50 mcg 50 mcg Every Early Morning 8/23/2017     Sig - Route: Take 1 tablet by mouth Every Morning. - Oral    lidocaine PF 1%  (XYLOCAINE) injection 0.8 mL 0.8 mL Daily PRN 8/22/2017     Sig - Route: Inject 0.8 mL into the skin Daily As Needed (use just before HD tx.). - Intradermal    lidocaine-prilocaine (EMLA) 2.5-2.5 % cream  Daily PRN 8/23/2017     Sig - Route: Apply  topically Daily As Needed for Mild Pain . - Topical    metoprolol succinate XL (TOPROL-XL) 24 hr tablet 100 mg 100 mg Every 24 Hours Scheduled 8/22/2017     Sig - Route: Take 1 tablet by mouth Daily. - Oral    minoxidil (LONITEN) half tablet 5 mg 5 mg Every 24 Hours Scheduled 8/23/2017     Sig - Route: Take 1 half tablet by mouth Daily. - Oral    ondansetron (ZOFRAN) injection 4 mg 4 mg Every 4 Hours PRN 8/22/2017     Sig - Route: Infuse 2 mL into a venous catheter Every 4 (Four) Hours As Needed for Nausea or Vomiting. - Intravenous    rosuvastatin (CRESTOR) tablet 10 mg 10 mg Nightly 8/22/2017     Sig - Route: Take 1 tablet by mouth Every Night. - Oral    sodium chloride 0.9 % flush 1-10 mL 1-10 mL As Needed 8/22/2017     Sig - Route: Infuse 1-10 mL into a venous catheter As Needed for Line Care. - Intravenous    fentaNYL citrate (PF) (SUBLIMAZE) injection (Discontinued)  As Needed 8/23/2017 8/23/2017    Sig: As Needed.    Reason for Discontinue: Patient Discharge    midazolam (VERSED) injection (Discontinued)  As Needed 8/23/2017 8/23/2017    Sig: As Needed.    Reason for Discontinue: Patient Discharge

## 2017-08-24 NOTE — PLAN OF CARE
Problem: Patient Care Overview (Adult)  Goal: Plan of Care Review  Outcome: Ongoing (interventions implemented as appropriate)    08/24/17 1506   Coping/Psychosocial Response Interventions   Plan Of Care Reviewed With patient   Patient Care Overview   Progress no change   Outcome Evaluation   Outcome Summary/Follow up Plan Pt has been ambulating in hallway with family this afternoon. Pt reports no pain at this time. Pt does however report tenderness to his shunt that was recently placed; will continue to monitor.       Goal: Adult Individualization and Mutuality  Outcome: Ongoing (interventions implemented as appropriate)    Problem: Chronic Kidney Disease/End Stage Renal Disease (Adult)  Goal: Signs and Symptoms of Listed Potential Problems Will be Absent or Manageable (Chronic Kidney Disease/End Stage Renal Disease)  Outcome: Ongoing (interventions implemented as appropriate)

## 2017-08-24 NOTE — NURSING NOTE
Pt started feeling nauseated around 1830 following supper and one-time dose of dilaudid. Something similar occurred today at dialysis - his first time getting dialysis. Pt was sweaty and gray looking. Vital signs included low-normal blood pressure and a blood sugar reading of 114. EKG showed normal sinus rhythm. Dr. ISABEL Fang advised. No new orders.

## 2017-08-24 NOTE — PROGRESS NOTES
"[facility]  [address] [place] [address]. [address]  T - [phone]  F - [phone]     Progress Note          PATIENT  DEMOGRAPHICS   PATIENT NAME: Jesús Austin                      PHYSICIAN: Anca Merrill MD  : 1950  MRN: [id number]   LOS: 2 days    Patient Care Team:  Luis Miguel Orourke MD as PCP - General (Family Medicine)  Juan Pro MD as PCP - Claims Attributed  Subjective   SUBJECTIVE   Seen during dialysis has low bp and not able to remove large UF       Objective   OBJECTIVE   Vital Signs  Temp:  [96.4 °F (35.8 °C)-98.4 °F (36.9 °C)] 97.6 °F (36.4 °C)  Heart Rate:  [68-98] 81  Resp:  [18-20] 18  BP: (124-162)/(63-77) 126/68    Flowsheet Rows         First Filed Value    Admission Height  71\" (180.3 cm) Documented at 2017 1514    Admission Weight  244 lb (111 kg) Documented at 2017 1514           I/O last 3 completed shifts:  In: 360 [P.O.:360]  Out: 3225 [Urine:; Other:1200]    PHYSICAL EXAM    Physical Exam   Constitutional: He is oriented to person, place, and time. He appears well-developed.   HENT:   Head: Normocephalic.   Eyes: Pupils are equal, round, and reactive to light.   Cardiovascular: Normal rate, regular rhythm and normal heart sounds.    Pulmonary/Chest: Effort normal and breath sounds normal.   Abdominal: Soft. Bowel sounds are normal.   Musculoskeletal: He exhibits edema.   Neurological: He is alert and oriented to person, place, and time.       RESULTS   Results Review:      Results from last 7 days  Lab Units 17  0724 17  0830 17  1220   SODIUM mmol/L 133* 139 137  137   POTASSIUM mmol/L 3.8 4.4 4.6  4.6   CHLORIDE mmol/L 96 104 104  104   CO2 mmol/L 29.0 22.0 21.0*  21.0*   BUN mg/dL 42* 58* 61*  60*   CREATININE mg/dL 5.30* 6.43* 6.10*  6.10*   CALCIUM mg/dL 8.9 8.9 9.1  9.0   BILIRUBIN mg/dL 0.5 0.4 0.6   ALK PHOS U/L 43 46 45   ALT (SGPT) U/L 29 33 39   AST (SGOT) U/L 21 19 21   GLUCOSE mg/dL " 167* 106* 88  86       Estimated Creatinine Clearance: 17 mL/min (by C-G formula based on Cr of 5.3).      Results from last 7 days  Lab Units 08/22/17  1220 08/18/17  1101   PHOSPHORUS mg/dL 6.0* 6.4*               Results from last 7 days  Lab Units 08/24/17  0724 08/23/17  0830 08/22/17  1230 08/18/17  1101   WBC 10*3/mm3 8.48 6.70 6.38  --    HEMOGLOBIN g/dL 9.2* 9.6* 10.3* 10.9*   PLATELETS 10*3/mm3 182 193 209  --                Imaging Results (last 24 hours)     Procedure Component Value Units Date/Time    US Guided Vascular Access [[id number]] Resulted:  08/23/17 1045     Updated:  08/23/17 1045    Narrative:       This procedure was auto-finalized with no dictation required.    XR Chest Post CVA Port [[id number]] Collected:  08/23/17 1053     Updated:  08/23/17 1144    Narrative:       [facility]    Patient Name: AILYN REALLEY    ORDERING: ALMA COX     ATTENDING: DAYTON DUBOSE     REFERRING: ALMA COX    -----------------------    PROCEDURE: Portable chest x-ray    TECHNIQUE: Single AP view of the chest    COMPARISON: 8/22/2017    HISTORY: tunnel cath, N18.6 End stage renal disease    FINDINGS:     Life-support devices: There is a Central Venous access Line  terminating in the Superior Vena Cava    Lungs/pleura: Clear, no pneumothorax.    Heart, hilar and mediastinal structures:  Normal accounting for  projection and technique      Impression:       CONCLUSION:  No pneumothorax   There is a Central Venous access Line terminating in the Inferior  Superior Vena Cava    Electronically signed by:  Aubrey Shirley MD  8/23/2017 11:43 AM  CDT Workstation: [id number]           MEDICATIONS      allopurinol 100 mg Oral Daily   amLODIPine 5 mg Oral Daily   bumetanide 2 mg Oral Daily   docusate sodium 100 mg Oral BID   famotidine 40 mg Oral Daily   fluticasone 1 spray Nasal Daily   levothyroxine 50 mcg Oral Q AM   metoprolol succinate  mg Oral Q24H   minoxidil 5 mg Oral Q24H    rosuvastatin 10 mg Oral Nightly          Assessment/Plan   ASSESSMENT / PLAN    Active Problems:    ESRD (end stage renal disease)    1.CKD 5 now worsening uremia with GFR of only 9.  Patient doesn't have any marked fluid overload but does have severe uremia and shortness of breath on exertion.  we have attempted avf x2 and unable to cannulate venous needle.    Appreciate Dr Shields help for tunnel cath and he has been dialyzed twice. Hd again tomorrow and then discharge. Likely HD from Monday in out pt setting. F/u with dr Shields to look for maturity of AVF/doppler.      2.hypertension - his blood pressure is low with hd and will stop minoxidil and lower amlodipine. Avoid anti htn pre hd. We may consider ACE inhibitor later     3.anemia of chronic kidney disease.  Patient hemoglobin is <10 will start epogen with hd.      4.secondary hyperparathyroidism patient phosphorus is high.  we may need to add PhosLo.  check phos in am              This document has been electronically signed by Anca Merrill MD on August 24, 2017 10:35 AM

## 2017-08-25 VITALS
BODY MASS INDEX: 33.02 KG/M2 | HEART RATE: 87 BPM | RESPIRATION RATE: 16 BRPM | OXYGEN SATURATION: 96 % | DIASTOLIC BLOOD PRESSURE: 77 MMHG | TEMPERATURE: 98.4 F | SYSTOLIC BLOOD PRESSURE: 144 MMHG | WEIGHT: 235.89 LBS | HEIGHT: 71 IN

## 2017-08-25 LAB
ALBUMIN SERPL-MCNC: 4.1 G/DL (ref 3.4–4.8)
ALBUMIN/GLOB SERPL: 1.3 G/DL (ref 1.1–1.8)
ALP SERPL-CCNC: 42 U/L (ref 38–126)
ALT SERPL W P-5'-P-CCNC: 30 U/L (ref 21–72)
ANION GAP SERPL CALCULATED.3IONS-SCNC: 11 MMOL/L (ref 5–15)
AST SERPL-CCNC: 28 U/L (ref 17–59)
BASOPHILS # BLD AUTO: 0.04 10*3/MM3 (ref 0–0.2)
BASOPHILS NFR BLD AUTO: 0.6 % (ref 0–2)
BILIRUB SERPL-MCNC: 0.5 MG/DL (ref 0.2–1.3)
BUN BLD-MCNC: 34 MG/DL (ref 7–21)
BUN/CREAT SERPL: 6.8 (ref 7–25)
CALCIUM SPEC-SCNC: 9 MG/DL (ref 8.4–10.2)
CHLORIDE SERPL-SCNC: 95 MMOL/L (ref 95–110)
CO2 SERPL-SCNC: 27 MMOL/L (ref 22–31)
CREAT BLD-MCNC: 4.98 MG/DL (ref 0.7–1.3)
DEPRECATED RDW RBC AUTO: 47.7 FL (ref 35.1–43.9)
EOSINOPHIL # BLD AUTO: 0.24 10*3/MM3 (ref 0–0.7)
EOSINOPHIL NFR BLD AUTO: 3.5 % (ref 0–7)
ERYTHROCYTE [DISTWIDTH] IN BLOOD BY AUTOMATED COUNT: 14.3 % (ref 11.5–14.5)
GFR SERPL CREATININE-BSD FRML MDRD: 12 ML/MIN/1.73 (ref 49–113)
GLOBULIN UR ELPH-MCNC: 3.1 GM/DL (ref 2.3–3.5)
GLUCOSE BLD-MCNC: 123 MG/DL (ref 60–100)
HCT VFR BLD AUTO: 27.5 % (ref 39–49)
HGB BLD-MCNC: 9.5 G/DL (ref 13.7–17.3)
IMM GRANULOCYTES # BLD: 0.04 10*3/MM3 (ref 0–0.02)
IMM GRANULOCYTES NFR BLD: 0.6 % (ref 0–0.5)
LYMPHOCYTES # BLD AUTO: 1.33 10*3/MM3 (ref 0.6–4.2)
LYMPHOCYTES NFR BLD AUTO: 19.4 % (ref 10–50)
MCH RBC QN AUTO: 31.5 PG (ref 26.5–34)
MCHC RBC AUTO-ENTMCNC: 34.5 G/DL (ref 31.5–36.3)
MCV RBC AUTO: 91.1 FL (ref 80–98)
MONOCYTES # BLD AUTO: 0.74 10*3/MM3 (ref 0–0.9)
MONOCYTES NFR BLD AUTO: 10.8 % (ref 0–12)
NEUTROPHILS # BLD AUTO: 4.47 10*3/MM3 (ref 2–8.6)
NEUTROPHILS NFR BLD AUTO: 65.1 % (ref 37–80)
NRBC BLD MANUAL-RTO: 0 /100 WBC (ref 0–0)
PLATELET # BLD AUTO: 180 10*3/MM3 (ref 150–450)
PMV BLD AUTO: 11.6 FL (ref 8–12)
POTASSIUM BLD-SCNC: 3.6 MMOL/L (ref 3.5–5.1)
PROT SERPL-MCNC: 7.2 G/DL (ref 6.3–8.6)
RBC # BLD AUTO: 3.02 10*6/MM3 (ref 4.37–5.74)
SODIUM BLD-SCNC: 133 MMOL/L (ref 137–145)
WBC NRBC COR # BLD: 6.86 10*3/MM3 (ref 3.2–9.8)

## 2017-08-25 PROCEDURE — 80053 COMPREHEN METABOLIC PANEL: CPT | Performed by: FAMILY MEDICINE

## 2017-08-25 PROCEDURE — 85025 COMPLETE CBC W/AUTO DIFF WBC: CPT | Performed by: FAMILY MEDICINE

## 2017-08-25 PROCEDURE — 25010000002 HEPARIN (PORCINE) PER 1000 UNITS: Performed by: INTERNAL MEDICINE

## 2017-08-25 PROCEDURE — 25010000002 ONDANSETRON PER 1 MG: Performed by: FAMILY MEDICINE

## 2017-08-25 RX ORDER — HEPARIN SODIUM 1000 [USP'U]/ML
2000 INJECTION, SOLUTION INTRAVENOUS; SUBCUTANEOUS AS NEEDED
Status: DISCONTINUED | OUTPATIENT
Start: 2017-08-25 | End: 2017-08-25 | Stop reason: HOSPADM

## 2017-08-25 RX ORDER — HEPARIN SODIUM 1000 [USP'U]/ML
3600 INJECTION, SOLUTION INTRAVENOUS; SUBCUTANEOUS DAILY PRN
Status: DISCONTINUED | OUTPATIENT
Start: 2017-08-25 | End: 2017-08-25 | Stop reason: HOSPADM

## 2017-08-25 RX ADMIN — DOCUSATE SODIUM 100 MG: 100 CAPSULE, LIQUID FILLED ORAL at 12:49

## 2017-08-25 RX ADMIN — HEPARIN SODIUM 3600 UNITS: 1000 INJECTION, SOLUTION INTRAVENOUS; SUBCUTANEOUS at 11:45

## 2017-08-25 RX ADMIN — ALLOPURINOL 100 MG: 100 TABLET ORAL at 12:49

## 2017-08-25 RX ADMIN — FAMOTIDINE 40 MG: 40 TABLET ORAL at 12:50

## 2017-08-25 RX ADMIN — LEVOTHYROXINE SODIUM 50 MCG: 50 TABLET ORAL at 05:28

## 2017-08-25 RX ADMIN — ONDANSETRON 4 MG: 2 INJECTION INTRAMUSCULAR; INTRAVENOUS at 09:10

## 2017-08-25 RX ADMIN — FLUTICASONE PROPIONATE 1 SPRAY: 50 SPRAY, METERED NASAL at 13:10

## 2017-08-25 RX ADMIN — BUMETANIDE 2 MG: 1 TABLET ORAL at 12:49

## 2017-08-25 NOTE — DISCHARGE INSTR - LAB
Follow up appointment  Dr. Luis Miguel Orourke  September 5, 1:00  [phone]    If any questions or concerns please call.

## 2017-08-25 NOTE — DISCHARGE SUMMARY
[facility] Medicine Services  DISCHARGE SUMMARY       Date of Admission: 8/22/2017  Date of Discharge:  8/25/2017  Primary Care Physician: Luis Miguel Orourke MD    Presenting Problem/History of Present Illness:  ESRD (end stage renal disease) [N18.6]  ESRD (end stage renal disease) [N18.6]     CKD  HTN  Gout  Hypothyrodisim  Anemia  dyslipedmia  GERD  Consults:   Final Discharge Diagnoses:  Hospital Problem List     ESRD (end stage renal disease)      ESRD , improved   CKD  HTN  Gout  Hypothyrodisim  Anemia  dyslipedmia  GERD  Consults:     Consults:   Consults     Date and Time Order Name Status Description    8/23/2017 0931 Inpatient Consult to Cardiothoracic Surgery            Procedures Performed: Procedure(s):  tunneled central venous catheter placement                Pertinent Test Results:    Laboratory Data:     Results from last 7 days  Lab Units 08/25/17  0812 08/24/17  0724 08/23/17  0830   SODIUM mmol/L 133* 133* 139   POTASSIUM mmol/L 3.6 3.8 4.4   CHLORIDE mmol/L 95 96 104   CO2 mmol/L 27.0 29.0 22.0   BUN mg/dL 34* 42* 58*   CREATININE mg/dL 4.98* 5.30* 6.43*   GLUCOSE mg/dL 123* 167* 106*   CALCIUM mg/dL 9.0 8.9 8.9   BILIRUBIN mg/dL 0.5 0.5 0.4   ALK PHOS U/L 42 43 46   ALT (SGPT) U/L 30 29 33   AST (SGOT) U/L 28 21 19   ANION GAP mmol/L 11.0 8.0 13.0     Estimated Creatinine Clearance: 17.7 mL/min (by C-G formula based on Cr of 4.98).    Results from last 7 days  Lab Units 08/22/17  1220   PHOSPHORUS mg/dL 6.0*           Results from last 7 days  Lab Units 08/25/17  0812 08/24/17  0724 08/23/17  0830 08/22/17  1230   WBC 10*3/mm3 6.86 8.48 6.70 6.38   HEMOGLOBIN g/dL 9.5* 9.2* 9.6* 10.3*   HEMATOCRIT % 27.5* 27.5* 29.0* 29.9*   PLATELETS 10*3/mm3 180 182 193 209           Culture Data:   No results found for: BLOODCX  Urine Culture   Date Value Ref Range Status   08/22/2017 No growth at 24 hours  Final     No results found for: RESPCX  No results found for:  WOUNDCX  No results found for: STOOLCX  No components found for: BODYFLD    Micobiology                    Urine Culture   Date Value Ref Range Status   08/22/2017 No growth at 24 hours  Final          Radiology Data:   Imaging Results (all)     Procedure Component Value Units Date/Time    XR Chest PA & Lateral [[id number]] Collected:  08/22/17 1519     Updated:  08/22/17 1540    Narrative:         EXAM:          Radiograph(s), Chest   VIEWS:    PA / Lat ; 2       DATE/TIME:  8/22/2017 3:38 PM CDT               INDICATION:    dyspnea           COMPARISON:  CXR: 2/1/17          FINDINGS:             - lines/tubes:    none     - cardiac:         size within normal limits         - mediastinum: contour within normal limits         - lungs:         no focal air space process, pulmonary  interstitial edema, nodule(s)/mass             - pleura:         no evidence of  fluid                  - osseous:         unremarkable for age                  - misc.:         Impression:       CONCLUSION:        1. Negative examination.                                     Electronically signed by:  BIN Willis MD  8/22/2017 3:38  PM CDT Workstation: [id number]    US Guided Vascular Access [[id number]] Resulted:  08/23/17 1045     Updated:  08/23/17 1045    Narrative:       This procedure was auto-finalized with no dictation required.    XR Chest Post CVA Port [[id number]] Collected:  08/23/17 1053     Updated:  08/23/17 1144    Narrative:       [facility]    Patient Name: AILYN DUMONT    ORDERING: ALMA COX     ATTENDING: DAYTON DUBOSE     REFERRING: ALMA COX    -----------------------    PROCEDURE: Portable chest x-ray    TECHNIQUE: Single AP view of the chest    COMPARISON: 8/22/2017    HISTORY: tunnel cath, N18.6 End stage renal disease    FINDINGS:     Life-support devices: There is a Central Venous access Line  terminating in the Superior Vena Cava    Lungs/pleura: Clear, no  pneumothorax.    Heart, hilar and mediastinal structures:  Normal accounting for  projection and technique      Impression:       CONCLUSION:  No pneumothorax   There is a Central Venous access Line terminating in the Inferior  Superior Vena Cava    Electronically signed by:  Aubrey Shirley MD  8/23/2017 11:43 AM  CDT Workstation: [id number]          Chief Complaint on Day of Discharge: none    HPI:Patient was sent from his nephrologist office Dr Merrill for urgent dialysis , patient  complains of feeling more tired and fatigued in the last few days, and worsening dyspnea on exertion and edema.   Patient denies fever or chills, headache , dizziness, visual changes,  chest pain or palpitations,or cough, abdominal pain , N/V/D , blood in the stool or urine or urinary symptoms,numbness or tingling in the extremities.  Hospital Course: Patient had a tunnel cath done, and was dialyzed 3 times during his hospital stay. Symptoms improved. Patient had multiple episodes of hypotension. Patient's BP meds held on discharge , patient was to continue Bumex. patient to get HD next Monday after discharge and follow up with nephrologist Dr Merrill .    Condition on Discharge:  Improved and Stable    Physical Exam on Discharge:  Temp:  [97.5 °F (36.4 °C)-98.8 °F (37.1 °C)] 98.4 °F (36.9 °C)  Heart Rate:  [76-96] 87  Resp:  [16-18] 16  BP: (127-145)/(66-77) 144/77    Constitutional: Patient is AAO x 3. Patient appears well-developed and well-nourished.   Cardiovascular: Normal rate, regular rhythm, normal heart sounds and intact distal pulses.  Exam reveals no gallop and no friction rub.  No murmur heard.  Pulmonary/Chest: Effort normal and breath sounds normal. No respiratory distress. No wheezes, rales or ronchi.  Abdominal: Soft. Bowel sounds are normal. No distension, no tenderness. There is no rebound and no guarding. No hernia.   Musculoskeletal: No Cyanosis clubbing or edema.    Discharge Disposition:  Home or Self Care    Discharge  Medications:   Jesús Austin   Home Medication Instructions ROXANA:[id number]    Printed on:08/25/17 6710   Medication Information                      acetaminophen (TYLENOL) 650 MG 8 hr tablet  Take 650 mg by mouth Every 8 (Eight) Hours As Needed for Mild Pain .             albuterol (PROVENTIL HFA;VENTOLIN HFA) 108 (90 BASE) MCG/ACT inhaler  Inhale 2 puffs Every 4 (Four) Hours As Needed for wheezing or shortness of air.             allopurinol (ZYLOPRIM) 100 MG tablet  Take 100 mg by mouth.             benzonatate (TESSALON) 100 MG capsule  Take 2 capsules by mouth 3 (Three) Times a Day As Needed for cough.             bumetanide (BUMEX) 2 MG tablet  Take 2 mg by mouth Daily.             CIMETIDINE ACID REDUCER PO  Take  by mouth 2 (Two) Times a Day.             CO ENZYME Q-10 PO  Take 100 mg by mouth Daily.             cyclobenzaprine (FLEXERIL) 10 MG tablet  Take 10 mg by mouth 3 (Three) Times a Day As Needed.             DHA-EPA-VITAMIN E PO  Take  by mouth.             DOCUSATE SODIUM PO  Take  by mouth Daily.             fluticasone (FLONASE) 50 MCG/ACT nasal spray  1 spray into each nostril.             guaiFENesin (MUCINEX) 600 MG 12 hr tablet  Take 2 tablets by mouth 2 (Two) Times a Day.             levothyroxine (SYNTHROID, LEVOTHROID) 50 MCG tablet  Take 50 mcg by mouth.             Multiple Vitamins-Minerals (MULTIVITAMIN ADULT PO)  Take 1 tablet by mouth Daily.             Omega-3 Fatty Acids (FISH OIL) 1000 MG capsule capsule  Take 1,200 mg by mouth Every Night.             rosuvastatin (CRESTOR) 10 MG tablet  Take 10 mg by mouth.                 Discharge Diet:   Diet Instructions     Diet: Consistent Carbohydrate, Cardiac, Renal       Discharge Diet:   Consistent Carbohydrate  Cardiac  Renal                    Activity at Discharge:   Activity Instructions     Activity as Tolerated                     All the abnormal findings were discussed with the patient in details and the patient verbalized  understanding of needing to follow up with PCP and and other specialities of  outpatient follow up for further evaluation and management.    Discharge Care Plan/Instructions: follow up with PCP in 2-3 days, Dr Merrill , and Dr Shields    Follow-up Appointments:   Future Appointments  Date Time Provider Department Center   9/25/2017 1:40 PM Michael Shields MD [facility] None       Test Results Pending at Discharge:      This document has been electronically signed by Shad Fang MD on August 25, 2017 2:24 PM

## 2017-08-25 NOTE — DISCHARGE INSTR - OTHER ORDERS
[facility] dialysis schedule is Monday, Wednesday, Friday at 11 am.   First treatment is on Monday,August 28, 2017 at 11 am

## 2017-08-25 NOTE — PLAN OF CARE
Problem: Patient Care Overview (Adult)  Goal: Plan of Care Review  Outcome: Ongoing (interventions implemented as appropriate)    08/24/17 1506 08/24/17 2045 08/25/17 0341   Coping/Psychosocial Response Interventions   Plan Of Care Reviewed With --  patient;spouse --    Patient Care Overview   Progress no change --  --    Outcome Evaluation   Outcome Summary/Follow up Plan --  --  pt rested comfortably during the night, ambulated in the belle earlier in the shift; C/o of left arm where shunt is of being tender to touch       Goal: Adult Individualization and Mutuality  Outcome: Ongoing (interventions implemented as appropriate)  Goal: Discharge Needs Assessment  Outcome: Ongoing (interventions implemented as appropriate)    Problem: Chronic Kidney Disease/End Stage Renal Disease (Adult)  Goal: Signs and Symptoms of Listed Potential Problems Will be Absent or Manageable (Chronic Kidney Disease/End Stage Renal Disease)  Outcome: Ongoing (interventions implemented as appropriate)

## 2017-08-25 NOTE — PROGRESS NOTES
"[facility]  [address] [place] [address]. [address]  T - [phone]  F - [phone]     Progress Note          PATIENT  DEMOGRAPHICS   PATIENT NAME: Jesús Austin                      PHYSICIAN: Anca Merrill MD  : 1950  MRN: [id number]   LOS: 3 days    Patient Care Team:  Luis Miguel Orourke MD as PCP - General (Family Medicine)  Juan Pro MD as PCP - Claims Attributed  Subjective   SUBJECTIVE   Seen during dialysis has low bp and has sweating. Blood flow rate is reduced       Objective   OBJECTIVE   Vital Signs  Temp:  [97.5 °F (36.4 °C)-98.9 °F (37.2 °C)] 98.8 °F (37.1 °C)  Heart Rate:  [76-96] 89  Resp:  [18] 18  BP: (127-145)/(66-77) 141/75    Flowsheet Rows         First Filed Value    Admission Height  71\" (180.3 cm) Documented at 2017 1514    Admission Weight  244 lb (111 kg) Documented at 2017 1514           I/O last 3 completed shifts:  In: 960 [P.O.:960]  Out:  [Urine:1075; Other:1000]    PHYSICAL EXAM    Physical Exam   Constitutional: He is oriented to person, place, and time. He appears well-developed.   HENT:   Head: Normocephalic.   Eyes: Pupils are equal, round, and reactive to light.   Cardiovascular: Normal rate, regular rhythm and normal heart sounds.    Pulmonary/Chest: Effort normal and breath sounds normal.   Abdominal: Soft. Bowel sounds are normal.   Musculoskeletal: He exhibits edema.   Neurological: He is alert and oriented to person, place, and time.       RESULTS   Results Review:      Results from last 7 days  Lab Units 17  0812 17  0724 17  0830   SODIUM mmol/L 133* 133* 139   POTASSIUM mmol/L 3.6 3.8 4.4   CHLORIDE mmol/L 95 96 104   CO2 mmol/L 27.0 29.0 22.0   BUN mg/dL 34* 42* 58*   CREATININE mg/dL 4.98* 5.30* 6.43*   CALCIUM mg/dL 9.0 8.9 8.9   BILIRUBIN mg/dL 0.5 0.5 0.4   ALK PHOS U/L 42 43 46   ALT (SGPT) U/L 30 29 33   AST (SGOT) U/L 28 21 19   GLUCOSE mg/dL 123* 167* 106*       Estimated " Creatinine Clearance: 17.8 mL/min (by C-G formula based on Cr of 4.98).      Results from last 7 days  Lab Units 08/22/17  1220 08/18/17  1101   PHOSPHORUS mg/dL 6.0* 6.4*               Results from last 7 days  Lab Units 08/25/17  0812 08/24/17  0724 08/23/17  0830 08/22/17  1230 08/18/17  1101   WBC 10*3/mm3 6.86 8.48 6.70 6.38  --    HEMOGLOBIN g/dL 9.5* 9.2* 9.6* 10.3* 10.9*   PLATELETS 10*3/mm3 180 182 193 209  --                Imaging Results (last 24 hours)     ** No results found for the last 24 hours. **           MEDICATIONS      allopurinol 100 mg Oral Daily   bumetanide 2 mg Oral Daily   docusate sodium 100 mg Oral BID   famotidine 40 mg Oral Daily   fluticasone 1 spray Nasal Daily   levothyroxine 50 mcg Oral Q AM   metoprolol succinate  mg Oral Q24H   rosuvastatin 10 mg Oral Nightly          Assessment/Plan   ASSESSMENT / PLAN    Active Problems:    ESRD (end stage renal disease)    1.CKD 5 now worsening uremia with GFR of only 9.  Patient doesn't have any marked fluid overload but does have severe uremia and shortness of breath on exertion.  we have attempted avf x2 and unable to cannulate venous needle.    Dc after todays dialysis if bp improves and feels well. Stop toprol. Keep bumex a jaimee. Hd Monday as out pt. F/u with dr Shields to look for maturity of AVF/doppler.      2.hypertension - his blood pressure is low with hd. Off minoxidil and amlodipine. Stop metoprolol     3.anemia of chronic kidney disease.  Patient hemoglobin is <10 will start epogen with hd.      4.secondary hyperparathyroidism patient phosphorus is high.  we may need to add PhosLo.  check phos in am              This document has been electronically signed by Anca Merrill MD on August 25, 2017 9:13 AM

## 2018-02-05 ENCOUNTER — PROCEDURE VISIT (OUTPATIENT)
Dept: CARDIAC SURGERY | Facility: CLINIC | Age: 68
End: 2018-02-05

## 2018-02-05 DIAGNOSIS — M15.9 PRIMARY OSTEOARTHRITIS INVOLVING MULTIPLE JOINTS: ICD-10-CM

## 2018-02-05 DIAGNOSIS — I10 ESSENTIAL HYPERTENSION: ICD-10-CM

## 2018-02-05 DIAGNOSIS — I71.40 ABDOMINAL AORTIC ANEURYSM (AAA) WITHOUT RUPTURE (HCC): ICD-10-CM

## 2018-02-05 DIAGNOSIS — I65.23 BILATERAL CAROTID ARTERY STENOSIS: ICD-10-CM

## 2018-02-05 DIAGNOSIS — N18.6 ESRD (END STAGE RENAL DISEASE) (HCC): Primary | ICD-10-CM

## 2018-02-05 PROCEDURE — 99214 OFFICE O/P EST MOD 30 MIN: CPT | Performed by: THORACIC SURGERY (CARDIOTHORACIC VASCULAR SURGERY)

## 2018-02-05 PROCEDURE — 36589 REMOVAL TUNNELED CV CATH: CPT | Performed by: THORACIC SURGERY (CARDIOTHORACIC VASCULAR SURGERY)

## 2018-02-10 ENCOUNTER — DOCUMENTATION (OUTPATIENT)
Dept: CARDIAC SURGERY | Facility: CLINIC | Age: 68
End: 2018-02-10

## 2018-02-10 PROBLEM — I65.29 CAROTID ARTERY STENOSIS: Status: ACTIVE | Noted: 2018-02-10

## 2018-02-10 PROBLEM — M15.9 DEGENERATIVE JOINT DISEASE INVOLVING MULTIPLE JOINTS: Status: ACTIVE | Noted: 2018-02-10

## 2018-02-10 PROBLEM — I71.40 ABDOMINAL AORTIC ANEURYSM (HCC): Status: ACTIVE | Noted: 2018-02-10

## 2018-02-10 PROBLEM — I10 HYPERTENSION: Status: ACTIVE | Noted: 2018-02-10

## 2018-02-11 NOTE — PROGRESS NOTES
2/5/2018    Jesús Austin  1950    Chief Complaint: ESRD    HPI:      PCP:  Luis Miguel Orourke MD  Nephrology:  Dr Merrill    67 y.o. male with HTN(uncontrolled, increased risk stroke), AAA(stable, increased risk rupture), ESRD(stable, hemodialysis, increased risk cardiovascular events), Carotid Stenosis(stable, increased risk stroke).  never smoked.  Started dialysis 8/2017 via tunnel cath.  Immature radial AVF.  Now functional, no problems at dialysis. .  No other associated signs, symptoms or modifying factors.    7/5/2017 LEFT radial artery to cephalic vein forearm AV fistula([place])  8/2017 placement tunneled dialysis catheter  8/2017 ECG:  NSR 70, QTc 451, RBBB    The following portions of the patient's history were reviewed and updated as appropriate: allergies, current medications, past family history, past medical history, past social history, past surgical history and problem list.  Recent images independently reviewed.  Available laboratory values reviewed.    PMH:  Past Medical History:   Diagnosis Date   • Abdominal aortic aneurysm    • Allergic rhinitis    • Arthritis    • Carotid artery stenosis    • Degenerative joint disease involving multiple joints    • Disease of thyroid gland    • Diverticulitis    • Dyslipidemia    • Eustachian tube disorder    • Hypertension    • Kidney stone    • Malignant tumor of Meckel's diverticulum    • Myopic astigmatism    • Rheumatic fever        ALLERGIES:  Allergies   Allergen Reactions   • Morphine Nausea And Vomiting   • Tape Rash     Just plastic tape         MEDICATIONS:    Current Outpatient Prescriptions:   •  acetaminophen (TYLENOL) 650 MG 8 hr tablet, Take 650 mg by mouth Every 8 (Eight) Hours As Needed for Mild Pain ., Disp: , Rfl:   •  albuterol (PROVENTIL HFA;VENTOLIN HFA) 108 (90 BASE) MCG/ACT inhaler, Inhale 2 puffs Every 4 (Four) Hours As Needed for wheezing or shortness of air., Disp: 1 inhaler, Rfl: 0  •  allopurinol (ZYLOPRIM) 100 MG tablet,  Take 100 mg by mouth., Disp: , Rfl:   •  benzonatate (TESSALON) 100 MG capsule, Take 2 capsules by mouth 3 (Three) Times a Day As Needed for cough., Disp: 30 capsule, Rfl: 0  •  bumetanide (BUMEX) 2 MG tablet, Take 2 mg by mouth Daily., Disp: , Rfl:   •  CIMETIDINE ACID REDUCER PO, Take  by mouth 2 (Two) Times a Day., Disp: , Rfl:   •  CO ENZYME Q-10 PO, Take 100 mg by mouth Daily., Disp: , Rfl:   •  cyclobenzaprine (FLEXERIL) 10 MG tablet, Take 10 mg by mouth 3 (Three) Times a Day As Needed., Disp: , Rfl:   •  DHA-EPA-VITAMIN E PO, Take  by mouth., Disp: , Rfl:   •  DOCUSATE SODIUM PO, Take  by mouth Daily., Disp: , Rfl:   •  fluticasone (FLONASE) 50 MCG/ACT nasal spray, 1 spray into each nostril., Disp: , Rfl:   •  guaiFENesin (MUCINEX) 600 MG 12 hr tablet, Take 2 tablets by mouth 2 (Two) Times a Day., Disp: 40 tablet, Rfl: 0  •  levothyroxine (SYNTHROID, LEVOTHROID) 50 MCG tablet, Take 50 mcg by mouth., Disp: , Rfl:   •  Multiple Vitamins-Minerals (MULTIVITAMIN ADULT PO), Take 1 tablet by mouth Daily., Disp: , Rfl:   •  Omega-3 Fatty Acids (FISH OIL) 1000 MG capsule capsule, Take 1,200 mg by mouth Every Night., Disp: , Rfl:   •  rosuvastatin (CRESTOR) 10 MG tablet, Take 10 mg by mouth., Disp: , Rfl:     Review of Systems   Review of Systems   Constitution: Positive for malaise/fatigue. Negative for weakness and weight loss.   Cardiovascular: Negative for chest pain, claudication and dyspnea on exertion.   Respiratory: Negative for cough and shortness of breath.    Skin: Negative for color change and poor wound healing.   Musculoskeletal: Positive for arthritis.   Neurological: Negative for dizziness and numbness.       Physical Exam   Physical Exam   Constitutional: He is oriented to person, place, and time. He is active and cooperative. He does not appear ill. No distress.   HENT:   Right Ear: Hearing normal.   Left Ear: Hearing normal.   Nose: No nasal deformity. No epistaxis.   Mouth/Throat: He does not have  dentures. Normal dentition.   Cardiovascular: Normal rate and regular rhythm.    No murmur heard.  Pulses:       Carotid pulses are 2+ on the right side, and 2+ on the left side.       Radial pulses are 2+ on the right side, and 2+ on the left side.        Dorsalis pedis pulses are 2+ on the right side, and 2+ on the left side.   Good thrill LEFT forearm fistula  Tunnel cath site clean and dry   Pulmonary/Chest: Effort normal and breath sounds normal.   Abdominal: Soft. He exhibits no distension and no mass. There is no tenderness.   Musculoskeletal: He exhibits no deformity.   Gait normal.    Neurological: He is alert and oriented to person, place, and time. He has normal strength.   Skin: Skin is warm and dry. No cyanosis or erythema. No pallor.   No venous staining   Psychiatric: He has a normal mood and affect. His speech is normal. Judgment and thought content normal.     Results for AILYN AUSTIN (MRN [id number]) as of 2/10/2018 18:54   Ref. Range 8/25/2017 08:12   Creatinine Latest Ref Range: 0.70 - 1.30 mg/dL 4.98 (H)   BUN Latest Ref Range: 7 - 21 mg/dL 34 (H)     ASSESSMENT:  Diagnoses and all orders for this visit:    ESRD (end stage renal disease)    Abdominal aortic aneurysm (AAA) without rupture    Bilateral carotid artery stenosis    Essential hypertension    Primary osteoarthritis involving multiple joints  PLAN:  Detailed discussion with Ailyn Austin regarding situation and options.  Functional long term dialysis access.  No longer requires dialysis.  ready for tunnel cath removal.      Risks:  bleeding, catheter breakage which may require retrieval.  Benefits:  catheter removal, decrease incidence infection, blood clots.  Options:  none.  Understands and wishes to proceed.    Remove tunnel cath today.  Fistula followup [place]    Return after above studies complete  Recommended regular physical activity, progressive walking program.  Continue current medications as directed.    Thank you  for the opportunity to participate in this patient's care.    Copy to primary care provider.    EMR Dragon/Transcription disclaimer:   Much of this encounter note is an electronic transcription/translation of spoken language to printed text. The electronic translation of spoken language may permit erroneous, or at times, nonsensical words or phrases to be inadvertently transcribed; Although I have reviewed the note for such errors, some may still exist.

## 2018-02-11 NOTE — PROGRESS NOTES
OPERATIVE NOTE  Jesús Austin  1950 2/5/2018    PREOP DIAGNOSES:  ESRD    POSTOP DIAGNOSES:  ESRD    PROCEDURE:  Removal tunnelled central venous catheter with cuff    SURGEON: ZOFIA Shields MD FACS RPVI    ASSISTANT: Mode Pickering CFA    ANESTHESIA: local 1% lidocaine    ESTIMATED BLOOD LOSS: minimal    COMPLICATIONS: none    DESCRIPTION OF OPERATION: taken to procedure room, placed supine position, prepped draped sterile fashion.  1% lidocaine local anesthesia around cuff.  1cm incision make over cuff.  Distal catheter dissected free, removed intact.  Manual pressure held at vein insertion site with good hemostasis.  Cuff dissected free, catheter divided.  Proximal portion removed out exit site intact.  Incision closed with 4-0 nylon vertical mattress sutures x2, exit site left open to drain.  Pressure dressing applied.  Tolerated procedure well, home.             This document has been electronically signed by Michael Shields MD on February 10, 2018 7:05 PM

## 2018-05-14 ENCOUNTER — HOSPITAL ENCOUNTER (EMERGENCY)
Facility: HOSPITAL | Age: 68
Discharge: HOME | End: 2018-05-14
Attending: EMERGENCY MEDICINE | Admitting: EMERGENCY MEDICINE

## 2018-05-14 ENCOUNTER — APPOINTMENT (OUTPATIENT)
Dept: CT IMAGING | Facility: HOSPITAL | Age: 68
End: 2018-05-14

## 2018-05-14 VITALS
RESPIRATION RATE: 16 BRPM | SYSTOLIC BLOOD PRESSURE: 144 MMHG | OXYGEN SATURATION: 97 % | WEIGHT: 240 LBS | HEIGHT: 71 IN | HEART RATE: 74 BPM | BODY MASS INDEX: 33.6 KG/M2 | DIASTOLIC BLOOD PRESSURE: 80 MMHG | TEMPERATURE: 97.6 F

## 2018-05-14 DIAGNOSIS — N18.6 ESRD (END STAGE RENAL DISEASE) ON DIALYSIS (HCC): ICD-10-CM

## 2018-05-14 DIAGNOSIS — Z99.2 ESRD (END STAGE RENAL DISEASE) ON DIALYSIS (HCC): ICD-10-CM

## 2018-05-14 DIAGNOSIS — M51.36 LUMBAR DEGENERATIVE DISC DISEASE: Primary | ICD-10-CM

## 2018-05-14 LAB
ANION GAP SERPL CALCULATED.3IONS-SCNC: 13 MMOL/L (ref 5–15)
BASOPHILS # BLD AUTO: 0.02 10*3/MM3 (ref 0–0.2)
BASOPHILS NFR BLD AUTO: 0.1 % (ref 0–2)
BUN BLD-MCNC: 50 MG/DL (ref 7–21)
BUN/CREAT SERPL: 9.2 (ref 7–25)
CALCIUM SPEC-SCNC: 9.5 MG/DL (ref 8.4–10.2)
CHLORIDE SERPL-SCNC: 96 MMOL/L (ref 95–110)
CO2 SERPL-SCNC: 26 MMOL/L (ref 22–31)
CREAT BLD-MCNC: 5.42 MG/DL (ref 0.7–1.3)
DEPRECATED RDW RBC AUTO: 49.4 FL (ref 35.1–43.9)
EOSINOPHIL # BLD AUTO: 0.04 10*3/MM3 (ref 0–0.7)
EOSINOPHIL NFR BLD AUTO: 0.3 % (ref 0–7)
ERYTHROCYTE [DISTWIDTH] IN BLOOD BY AUTOMATED COUNT: 13.9 % (ref 11.5–14.5)
GFR SERPL CREATININE-BSD FRML MDRD: 11 ML/MIN/1.73 (ref 49–113)
GLUCOSE BLD-MCNC: 99 MG/DL (ref 60–100)
HCT VFR BLD AUTO: 32.8 % (ref 39–49)
HGB BLD-MCNC: 11.5 G/DL (ref 13.7–17.3)
HOLD SPECIMEN: NORMAL
IMM GRANULOCYTES # BLD: 0.05 10*3/MM3 (ref 0–0.02)
IMM GRANULOCYTES NFR BLD: 0.4 % (ref 0–0.5)
LYMPHOCYTES # BLD AUTO: 1.47 10*3/MM3 (ref 0.6–4.2)
LYMPHOCYTES NFR BLD AUTO: 10.9 % (ref 10–50)
MCH RBC QN AUTO: 33.9 PG (ref 26.5–34)
MCHC RBC AUTO-ENTMCNC: 35.1 G/DL (ref 31.5–36.3)
MCV RBC AUTO: 96.8 FL (ref 80–98)
MONOCYTES # BLD AUTO: 0.73 10*3/MM3 (ref 0–0.9)
MONOCYTES NFR BLD AUTO: 5.4 % (ref 0–12)
NEUTROPHILS # BLD AUTO: 11.15 10*3/MM3 (ref 2–8.6)
NEUTROPHILS NFR BLD AUTO: 82.9 % (ref 37–80)
PLATELET # BLD AUTO: 256 10*3/MM3 (ref 150–450)
PMV BLD AUTO: 10.5 FL (ref 8–12)
POTASSIUM BLD-SCNC: 4.4 MMOL/L (ref 3.5–5.1)
RBC # BLD AUTO: 3.39 10*6/MM3 (ref 4.37–5.74)
SODIUM BLD-SCNC: 135 MMOL/L (ref 137–145)
WBC NRBC COR # BLD: 13.46 10*3/MM3 (ref 3.2–9.8)
WHOLE BLOOD HOLD SPECIMEN: NORMAL

## 2018-05-14 PROCEDURE — 85025 COMPLETE CBC W/AUTO DIFF WBC: CPT | Performed by: EMERGENCY MEDICINE

## 2018-05-14 PROCEDURE — 80048 BASIC METABOLIC PNL TOTAL CA: CPT | Performed by: EMERGENCY MEDICINE

## 2018-05-14 PROCEDURE — 72131 CT LUMBAR SPINE W/O DYE: CPT

## 2018-05-14 PROCEDURE — 99283 EMERGENCY DEPT VISIT LOW MDM: CPT

## 2018-05-14 RX ORDER — ORPHENADRINE CITRATE 100 MG/1
100 TABLET, EXTENDED RELEASE ORAL 2 TIMES DAILY PRN
Qty: 20 TABLET | Refills: 0 | Status: SHIPPED | OUTPATIENT
Start: 2018-05-14 | End: 2020-06-15

## 2018-05-14 RX ORDER — ORPHENADRINE CITRATE 100 MG/1
100 TABLET, EXTENDED RELEASE ORAL ONCE
Status: COMPLETED | OUTPATIENT
Start: 2018-05-14 | End: 2018-05-14

## 2018-05-14 RX ORDER — OXYCODONE AND ACETAMINOPHEN 7.5; 325 MG/1; MG/1
1 TABLET ORAL ONCE
Status: COMPLETED | OUTPATIENT
Start: 2018-05-14 | End: 2018-05-14

## 2018-05-14 RX ORDER — SODIUM CHLORIDE 0.9 % (FLUSH) 0.9 %
10 SYRINGE (ML) INJECTION AS NEEDED
Status: DISCONTINUED | OUTPATIENT
Start: 2018-05-14 | End: 2018-05-14 | Stop reason: HOSPADM

## 2018-05-14 RX ORDER — OXYCODONE HYDROCHLORIDE AND ACETAMINOPHEN 5; 325 MG/1; MG/1
1 TABLET ORAL EVERY 6 HOURS PRN
Qty: 12 TABLET | Refills: 0 | Status: SHIPPED | OUTPATIENT
Start: 2018-05-14 | End: 2020-01-01

## 2018-05-14 RX ADMIN — ORPHENADRINE CITRATE 100 MG: 100 TABLET, EXTENDED RELEASE ORAL at 09:33

## 2018-05-14 RX ADMIN — OXYCODONE HYDROCHLORIDE AND ACETAMINOPHEN 1 TABLET: 7.5; 325 TABLET ORAL at 08:52

## 2018-05-14 NOTE — ED PROVIDER NOTES
Subjective   66yo male pmh significant htn/hyperlipidemia/esrd/AAA repair/carotid stenosis/oa/gout/hypothyroid presents ED c/o 4wk hx intermitting left lumbar low back pain/radiating left thigh/exac movement/relieved rest.  Pt seen urgent care yesterday for same, received 'steroid shot' w/o improvement.  Denies fever/chills/abd pain/syncope/paresthesia/motor weakness/bowel dysfunction/ noc sweats/wt loss/spinal procedures.        Back Pain   Location:  Lumbar spine  Quality:  Aching  Radiates to:  L thigh  Pain severity:  Moderate  Onset quality:  Gradual  Duration:  4 weeks  Progression:  Waxing and waning  Chronicity:  Recurrent  Associated symptoms: no numbness and no weakness        Review of Systems   Constitutional: Negative.    HENT: Negative.    Respiratory: Negative.    Cardiovascular: Negative.    Gastrointestinal: Negative.    Genitourinary: Negative.    Musculoskeletal: Positive for back pain.   Skin: Negative.    Neurological: Negative for weakness and numbness.   All other systems reviewed and are negative.      Past Medical History:   Diagnosis Date   • Abdominal aortic aneurysm    • Allergic rhinitis    • Arthritis    • Carotid artery stenosis    • Degenerative joint disease involving multiple joints    • Disease of thyroid gland    • Diverticulitis    • Dyslipidemia    • Eustachian tube disorder    • Hypertension    • Kidney stone    • Malignant tumor of Meckel's diverticulum    • Myopic astigmatism    • Rheumatic fever        Allergies   Allergen Reactions   • Dilaudid [Hydromorphone Hcl] Mental Status Change   • Morphine Nausea And Vomiting   • Tape Rash     Just plastic tape       Past Surgical History:   Procedure Laterality Date   • ABDOMINAL AORTIC ANEURYSM REPAIR  08/2016    2 repairs done at same time in [place]   • ARTERIOVENOUS FISTULA Left 07/05/2017    fistula placed in left forearm   • INJECTION OF MEDICATION      Kenalog (4)    • INTERVENTIONAL RADIOLOGY PROCEDURE N/A 8/23/2017     Procedure: tunneled central venous catheter placement;  Surgeon: Michael Shields MD;  Location: [facility];  Service:    • TESTICLE TORSION REPAIR N/A 1964    Pt not sure which side       Family History   Problem Relation Age of Onset   • Heart failure Father    • Diabetes Other    • Hypertension Other        Social History     Social History   • Marital status:      Social History Main Topics   • Smoking status: Never Smoker   • Smokeless tobacco: Never Used   • Alcohol use No   • Drug use: No   • Sexual activity: Yes     Partners: Female     Other Topics Concern   • Not on file           Objective   Physical Exam   Constitutional: He is oriented to person, place, and time. He appears well-developed and well-nourished.   HENT:   Head: Normocephalic and atraumatic.   Mouth/Throat: Oropharynx is clear and moist.   Eyes: Pupils are equal, round, and reactive to light.   Neck: Neck supple. No JVD present. No tracheal deviation present.   Cardiovascular: Normal rate, regular rhythm, normal heart sounds and intact distal pulses.  Exam reveals no gallop and no friction rub.    No murmur heard.  Pulmonary/Chest: Effort normal and breath sounds normal. He has no wheezes. He has no rales.   Abdominal: Soft. Bowel sounds are normal. He exhibits no mass. There is no tenderness. There is no rebound and no guarding.   Musculoskeletal: He exhibits no edema.        Lumbar back: He exhibits tenderness, pain and spasm.        Back:    Lymphadenopathy:     He has no cervical adenopathy.   Neurological: He is alert and oriented to person, place, and time.   Reflex Scores:       Patellar reflexes are 2+ on the right side and 2+ on the left side.       Achilles reflexes are 1+ on the right side and 1+ on the left side.  Df/pf/sensation intact. Motor 5/5 BLE. Rectal sphincter tone/sensation intact.   Nursing note and vitals reviewed.      Procedures           ED Course  ED Course   Comment By Time    Ekasper# [id number] reviewed Marcelo Manley MD 05/14 9995      Labs Reviewed   BASIC METABOLIC PANEL - Abnormal; Notable for the following:        Result Value    BUN 50 (*)     Creatinine 5.42 (*)     Sodium 135 (*)     eGFR Non  Amer 11 (*)     All other components within normal limits   CBC WITH AUTO DIFFERENTIAL - Abnormal; Notable for the following:     WBC 13.46 (*)     RBC 3.39 (*)     Hemoglobin 11.5 (*)     Hematocrit 32.8 (*)     RDW-SD 49.4 (*)     Neutrophil % 82.9 (*)     Neutrophils, Absolute 11.15 (*)     Immature Grans, Absolute 0.05 (*)     All other components within normal limits   CBC AND DIFFERENTIAL    Narrative:     The following orders were created for panel order CBC & Differential.  Procedure                               Abnormality         Status                     ---------                               -----------         ------                     CBC Auto Differential[[id number]]        Abnormal            Final result                 Please view results for these tests on the individual orders.   EXTRA TUBES    Narrative:     The following orders were created for panel order Extra Tubes.  Procedure                               Abnormality         Status                     ---------                               -----------         ------                     Light Blue Top[[id number]]                                   In process                 Gold Top - SST[[id number]]                                   In process                   Please view results for these tests on the individual orders.   LIGHT BLUE TOP   GOLD TOP - SST     Ct Lumbar Spine Without Contrast    Result Date: 5/14/2018  Narrative: CT lumbar spine without contrast. HISTORY: Low back pain. Prior exam: CT abdomen pelvis January 3, 2017. technique: Multiplanar noncontrast images lumbar spine. This exam was performed using radiation doses that are As Low As Reasonably Achievable (ALARA).This exam was performed  according to our departmental dose optimization program, which includes automated exposure control, adjustment of the mA and/or KV according to patient size and/or use of iterative reconstruction technique. FINDINGS: There are degenerative changes lumbar spine. Disc space narrowing and posterior osteophytes at L5-S1. No CT evidence of any disc protrusion or central canal stenosis. Mild bilateral neuroforaminal stenosis at L5-S1 secondary to posterior osteophytes and facet arthrosis. Abdominal aortic aneurysm stabilized, excluded by endovascular stent graft. (Maximum AP diameter 3.52 cm smaller than on prior CT exam.)     Impression: CONCLUSION: Degenerative changes L5-S1 with disc space narrowing and posterior osteophytes. No disc protrusion or central canal stenosis. Mild bilateral foraminal stenosis at L5-S1 secondary to facet arthrosis and posterior osteophytes. Incidental note of abdominal aortic aneurysm stabilized, excluded by endovascular stent graft. Aneurysm is smaller in comparison to CT abdomen January 3, 2017. CT lumbar spine is otherwise unremarkable. Electronically signed by:  Lon Maravilla MD  5/14/2018 9:37 AM CDT Workstation: [id number]                [facility]      Final diagnoses:   Lumbar degenerative disc disease   ESRD (end stage renal disease) on dialysis            Marcelo Manley MD  05/14/18 0946       Marcelo Manley MD  05/14/18 0955

## 2018-05-14 NOTE — DISCHARGE INSTRUCTIONS
Return ED fever, abdominal pain, bowel dysfunction, motor weakness, sensory loss, syncope, worse condition, other concerns

## 2019-02-19 ENCOUNTER — TRANSCRIBE ORDERS (OUTPATIENT)
Dept: PODIATRY | Facility: CLINIC | Age: 69
End: 2019-02-19

## 2019-02-19 DIAGNOSIS — L60.0 INGROWN NAIL OF GREAT TOE OF RIGHT FOOT: Primary | ICD-10-CM

## 2019-02-19 DIAGNOSIS — M79.89 PAIN AND SWELLING OF TOE, RIGHT: ICD-10-CM

## 2019-02-19 DIAGNOSIS — M79.674 PAIN AND SWELLING OF TOE, RIGHT: ICD-10-CM

## 2019-04-05 ENCOUNTER — OFFICE VISIT (OUTPATIENT)
Dept: PODIATRY | Facility: CLINIC | Age: 69
End: 2019-04-05

## 2019-04-05 VITALS
DIASTOLIC BLOOD PRESSURE: 77 MMHG | HEIGHT: 71 IN | SYSTOLIC BLOOD PRESSURE: 137 MMHG | TEMPERATURE: 97.9 F | HEART RATE: 97 BPM | WEIGHT: 243 LBS | BODY MASS INDEX: 34.02 KG/M2

## 2019-04-05 DIAGNOSIS — M79.674 GREAT TOE PAIN, RIGHT: Primary | ICD-10-CM

## 2019-04-05 DIAGNOSIS — L60.0 INGROWN TOENAIL: ICD-10-CM

## 2019-04-05 PROCEDURE — 99203 OFFICE O/P NEW LOW 30 MIN: CPT | Performed by: PODIATRIST

## 2019-04-05 PROCEDURE — 11750 EXCISION NAIL&NAIL MATRIX: CPT | Performed by: PODIATRIST

## 2019-04-05 NOTE — PROGRESS NOTES
Jesús Austin  1950  68 y.o. male    04/05/2019  Chief Complaint   Patient presents with   • Right Foot - Ingrown Toenail           History of Present Illness    Jesús Austin is a 68 y.o. male  Presents for evaluation of ingrowing right hallux nail. This is a chronic issue. There is associated sharp pain with palpation. Denies associated redness or drainage. Denies prior treatments.    Past Medical History:   Diagnosis Date   • Abdominal aortic aneurysm (CMS/HCC)    • Allergic rhinitis    • Arthritis    • Carotid artery stenosis    • Degenerative joint disease involving multiple joints    • Disease of thyroid gland    • Diverticulitis    • Dyslipidemia    • Eustachian tube disorder    • Hypertension    • Kidney stone    • Malignant tumor of Meckel's diverticulum (CMS/HCC)    • Myopic astigmatism    • Rheumatic fever          Past Surgical History:   Procedure Laterality Date   • ABDOMINAL AORTIC ANEURYSM REPAIR  08/2016    2 repairs done at same time in [place]   • ARTERIOVENOUS FISTULA Left 07/05/2017    fistula placed in left forearm   • INJECTION OF MEDICATION      Kenalog (4)    • INTERVENTIONAL RADIOLOGY PROCEDURE N/A 8/23/2017    Procedure: tunneled central venous catheter placement;  Surgeon: Michael Shields MD;  Location: [facility] LOC[facility];  Service:    • TESTICLE TORSION REPAIR N/A 1964    Pt not sure which side         Family History   Problem Relation Age of Onset   • Heart failure Father    • Diabetes Other    • Hypertension Other          Social History     Socioeconomic History   • Marital status:      Spouse name: Not on file   • Number of children: Not on file   • Years of education: Not on file   • Highest education level: Not on file   Tobacco Use   • Smoking status: Never Smoker   • Smokeless tobacco: Never Used   Substance and Sexual Activity   • Alcohol use: No   • Drug use: No   • Sexual activity: Yes     Partners: Female         Current Outpatient  "Medications   Medication Sig Dispense Refill   • acetaminophen (TYLENOL) 650 MG 8 hr tablet Take 650 mg by mouth Every 8 (Eight) Hours As Needed for Mild Pain .     • albuterol (PROVENTIL HFA;VENTOLIN HFA) 108 (90 BASE) MCG/ACT inhaler Inhale 2 puffs Every 4 (Four) Hours As Needed for wheezing or shortness of air. 1 inhaler 0   • allopurinol (ZYLOPRIM) 100 MG tablet Take 100 mg by mouth.     • benzonatate (TESSALON) 100 MG capsule Take 2 capsules by mouth 3 (Three) Times a Day As Needed for cough. 30 capsule 0   • bumetanide (BUMEX) 2 MG tablet Take 2 mg by mouth Daily.     • CIMETIDINE ACID REDUCER PO Take  by mouth 2 (Two) Times a Day.     • CO ENZYME Q-10 PO Take 100 mg by mouth Daily.     • DHA-EPA-VITAMIN E PO Take  by mouth.     • DOCUSATE SODIUM PO Take  by mouth Daily.     • fluticasone (FLONASE) 50 MCG/ACT nasal spray 1 spray into each nostril.     • guaiFENesin (MUCINEX) 600 MG 12 hr tablet Take 2 tablets by mouth 2 (Two) Times a Day. 40 tablet 0   • levothyroxine (SYNTHROID, LEVOTHROID) 50 MCG tablet Take 50 mcg by mouth.     • Multiple Vitamins-Minerals (MULTIVITAMIN ADULT PO) Take 1 tablet by mouth Daily.     • Omega-3 Fatty Acids (FISH OIL) 1000 MG capsule capsule Take 1,200 mg by mouth Every Night.     • orphenadrine (NORFLEX) 100 MG 12 hr tablet Take 1 tablet by mouth 2 (Two) Times a Day As Needed for Muscle Spasms. 20 tablet 0   • oxyCODONE-acetaminophen (PERCOCET) 5-325 MG per tablet Take 1 tablet by mouth Every 6 (Six) Hours As Needed for Moderate Pain . 12 tablet 0   • rosuvastatin (CRESTOR) 10 MG tablet Take 10 mg by mouth.       No current facility-administered medications for this visit.          OBJECTIVE    /77 (BP Location: Left arm)   Pulse 97   Temp 97.9 °F (36.6 °C)   Ht 180.3 cm (71\")   Wt 110 kg (243 lb)   BMI 33.89 kg/m²         Review of Systems   Constitutional: Negative.    HENT: Negative.    Respiratory: Positive for shortness of breath.    Musculoskeletal: Negative.  "   Neurological: Negative.    All other systems reviewed and are negative.        Physical Exam   Constitutional: he appears well-developed and well-nourished.   CV: No chest pain. Normal RR  Resp: Non labored respirations  Psychiatric: he has a normal mood and affect. her behavior is normal.      Lower Extremity Exam:  Vascular: DP/PT pulses palpable 2+.   No hallux edema  Toes warm  Neuro: Protective sensation intact, b/l.  DTRs intact  Integument: No open wounds.  Ingrown medial nail border, right hallux. No erythema, edema. +tenderness to palpation.  Web spaces c/d/i  No skin lesions  Musculoskeletal: LE muscle strength 5/5.   Gait normal  Ankle ROM full without pain or crepitus  STJ ROM full without pain or crepitus  No digital deformities      Nail Removal  Date/Time: 4/5/2019 2:11 PM  Performed by: Iggy Owens DPM  Authorized by: Iggy Owens DPM   Consent: Written consent obtained.  Risks and benefits: risks, benefits and alternatives were discussed  Consent given by: patient  Location: right foot  Location details: right big toe  Anesthesia: digital block    Anesthesia:  Local Anesthetic: lidocaine 2% without epinephrine  Anesthetic total: 3 mL  Preparation: skin prepped with Betadine  Amount removed: partial  Side: medial  Wedge excision of skin of nail fold: no  Nail bed sutured: no  Nail matrix removed: partial  Removed nail replaced and anchored: no  Dressing: 4x4, antibiotic ointment and gauze roll  Patient tolerance: Patient tolerated the procedure well with no immediate complications  Comments: Matrixectomy with 10% NaOH. Neutralized with acetic acid.                ASSESSMENT AND PLAN    Jesús was seen today for ingrown toenail.    Diagnoses and all orders for this visit:    Great toe pain, right    Ingrown toenail    Other orders  -     Nail Removal      -Comprehensive foot and ankle exam performed  -Diagnosis, prevention, and treatment of ingrown toe nails discussed with patient,  including risks and potential benefits of nail avulsion both temporary and permanent versus simple debridement.  -Pt elected for partial permanent avulsion of right hallux  -Aftercare instructions for soapy ro soaks BID  -Recheck 2 weeks              nt has been electronically signed by Iggy Owens DPM on April 7, 2019 2:11 PM     EMR Dragon/Transcription disclaimer:   Much of this encounter note is an electronic transcription/translation of spoken language to printed text. The electronic translation of spoken language may permit erroneous, or at times, nonsensical words or phrases to be inadvertently transcribed; Although I have reviewed the note for such errors, some may still exist.    Iggy Owens DPM  4/7/2019  2:11 PM

## 2019-04-19 ENCOUNTER — OFFICE VISIT (OUTPATIENT)
Dept: PODIATRY | Facility: CLINIC | Age: 69
End: 2019-04-19

## 2019-04-19 VITALS — WEIGHT: 243 LBS | HEIGHT: 71 IN | BODY MASS INDEX: 34.02 KG/M2

## 2019-04-19 DIAGNOSIS — L60.0 INGROWN TOENAIL: Primary | ICD-10-CM

## 2019-04-19 DIAGNOSIS — S91.209D NAIL AVULSION, TOE, SUBSEQUENT ENCOUNTER: ICD-10-CM

## 2019-04-19 PROCEDURE — 99212 OFFICE O/P EST SF 10 MIN: CPT | Performed by: PODIATRIST

## 2019-04-19 NOTE — PROGRESS NOTES
Jesús Austin  1950  68 y.o. male     Patient presents to clinic today for a follow up after a nail avulsion on 4/5/19.    04/19/2019    Chief Complaint   Patient presents with   • Right Foot - Follow-up           History of Present Illness    Jesús Austin is a 68 y.o. male  Presents for follow-up of right hallux partial nail avulsion.  He is doing well with no pain.  No drainage.    Past Medical History:   Diagnosis Date   • Abdominal aortic aneurysm (CMS/HCC)    • Allergic rhinitis    • Arthritis    • Carotid artery stenosis    • Degenerative joint disease involving multiple joints    • Disease of thyroid gland    • Diverticulitis    • Dyslipidemia    • Eustachian tube disorder    • Hypertension    • Ingrown toenail    • Kidney stone    • Malignant tumor of Meckel's diverticulum (CMS/HCC)    • Myopic astigmatism    • Rheumatic fever          Past Surgical History:   Procedure Laterality Date   • ABDOMINAL AORTIC ANEURYSM REPAIR  08/2016    2 repairs done at same time in [place]   • ARTERIOVENOUS FISTULA Left 07/05/2017    fistula placed in left forearm   • INJECTION OF MEDICATION      Kenalog (4)    • INTERVENTIONAL RADIOLOGY PROCEDURE N/A 8/23/2017    Procedure: tunneled central venous catheter placement;  Surgeon: Michael Shields MD;  Location: [facility] INVASIVE LOCATION;  Service:    • TESTICLE TORSION REPAIR N/A 1964    Pt not sure which side         Family History   Problem Relation Age of Onset   • Heart failure Father    • Diabetes Other    • Hypertension Other          Social History     Socioeconomic History   • Marital status:      Spouse name: Not on file   • Number of children: Not on file   • Years of education: Not on file   • Highest education level: Not on file   Tobacco Use   • Smoking status: Never Smoker   • Smokeless tobacco: Never Used   Substance and Sexual Activity   • Alcohol use: No   • Drug use: No   • Sexual activity: Yes     Partners: Female         Current  "Outpatient Medications   Medication Sig Dispense Refill   • acetaminophen (TYLENOL) 650 MG 8 hr tablet Take 650 mg by mouth Every 8 (Eight) Hours As Needed for Mild Pain .     • albuterol (PROVENTIL HFA;VENTOLIN HFA) 108 (90 BASE) MCG/ACT inhaler Inhale 2 puffs Every 4 (Four) Hours As Needed for wheezing or shortness of air. 1 inhaler 0   • allopurinol (ZYLOPRIM) 100 MG tablet Take 100 mg by mouth.     • benzonatate (TESSALON) 100 MG capsule Take 2 capsules by mouth 3 (Three) Times a Day As Needed for cough. 30 capsule 0   • bumetanide (BUMEX) 2 MG tablet Take 2 mg by mouth Daily.     • CIMETIDINE ACID REDUCER PO Take  by mouth 2 (Two) Times a Day.     • CO ENZYME Q-10 PO Take 100 mg by mouth Daily.     • DHA-EPA-VITAMIN E PO Take  by mouth.     • DOCUSATE SODIUM PO Take  by mouth Daily.     • fluticasone (FLONASE) 50 MCG/ACT nasal spray 1 spray into each nostril.     • guaiFENesin (MUCINEX) 600 MG 12 hr tablet Take 2 tablets by mouth 2 (Two) Times a Day. 40 tablet 0   • levothyroxine (SYNTHROID, LEVOTHROID) 50 MCG tablet Take 50 mcg by mouth.     • Multiple Vitamins-Minerals (MULTIVITAMIN ADULT PO) Take 1 tablet by mouth Daily.     • Omega-3 Fatty Acids (FISH OIL) 1000 MG capsule capsule Take 1,200 mg by mouth Every Night.     • orphenadrine (NORFLEX) 100 MG 12 hr tablet Take 1 tablet by mouth 2 (Two) Times a Day As Needed for Muscle Spasms. 20 tablet 0   • oxyCODONE-acetaminophen (PERCOCET) 5-325 MG per tablet Take 1 tablet by mouth Every 6 (Six) Hours As Needed for Moderate Pain . 12 tablet 0   • rosuvastatin (CRESTOR) 10 MG tablet Take 10 mg by mouth.       No current facility-administered medications for this visit.          OBJECTIVE    Ht 180.3 cm (71\")   Wt 110 kg (243 lb)   BMI 33.89 kg/m²         Review of Systems   Constitutional: Negative.    HENT: Negative.    Respiratory: Positive for shortness of breath.    Musculoskeletal: Negative.    Neurological: Negative.    All other systems reviewed and are " negative.        Physical Exam   Constitutional: he appears well-developed and well-nourished.   CV: No chest pain. Normal RR  Resp: Non labored respirations  Psychiatric: he has a normal mood and affect. her behavior is normal.      Lower Extremity Exam:  Vascular: DP/PT pulses palpable 2+.   No hallux edema  Toes warm  Neuro: Protective sensation intact, b/l.  DTRs intact  Integument: No open wounds.  Medial nail border right hallux is clean dry and intact.  No signs of infection.  Web spaces c/d/i  No skin lesions  Musculoskeletal: LE muscle strength 5/5.   Gait normal  Ankle ROM full without pain or crepitus  STJ ROM full without pain or crepitus  No digital deformities      Procedures        ASSESSMENT AND PLAN    Jesús was seen today for follow-up.    Diagnoses and all orders for this visit:    Ingrown toenail    Nail avulsion, toe, subsequent encounter      -Doing well following nail avulsion procedure.  -May discontinue soaks and bandaging  -Recheck as needed              nt has been electronically signed by Iggy Owens DPM on April 20, 2019 10:41 AM     EMR Dragon/Transcription disclaimer:   Much of this encounter note is an electronic transcription/translation of spoken language to printed text. The electronic translation of spoken language may permit erroneous, or at times, nonsensical words or phrases to be inadvertently transcribed; Although I have reviewed the note for such errors, some may still exist.    Iggy Owens DPM  4/20/2019  10:41 AM

## 2019-09-23 NOTE — H&P (VIEW-ONLY)
"[facility]  [address] [place] [address]. [address]  T - [phone]  F - [phone]     Progress Note          PATIENT  DEMOGRAPHICS   PATIENT NAME: Jesús Austin                      PHYSICIAN: Anca Merrill MD  : 1950  MRN: [id number]   LOS: 1 day    Patient Care Team:  Luis Miguel Orourke MD as PCP - General (Family Medicine)  Juan Pro MD as PCP - Claims Attributed  Subjective   SUBJECTIVE   Seen during dialysis still unable to cannulate and has some hematoma         Objective   OBJECTIVE   Vital Signs  Temp:  [97.4 °F (36.3 °C)-98.4 °F (36.9 °C)] 98.4 °F (36.9 °C)  Heart Rate:  [68-80] 72  Resp:  [18] 18  BP: (137-142)/(71-81) 137/71    Flowsheet Rows         First Filed Value    Admission Height  71\" (180.3 cm) Documented at 2017 1514    Admission Weight  244 lb (111 kg) Documented at 2017 1514           I/O last 3 completed shifts:  In: 600 [P.O.:600]  Out: 2715 [Urine:2715]    PHYSICAL EXAM    Physical Exam   Constitutional: He is oriented to person, place, and time. He appears well-developed.   HENT:   Head: Normocephalic.   Eyes: Pupils are equal, round, and reactive to light.   Cardiovascular: Normal rate, regular rhythm and normal heart sounds.    Pulmonary/Chest: Effort normal and breath sounds normal.   Abdominal: Soft. Bowel sounds are normal.   Musculoskeletal: He exhibits edema.   Neurological: He is alert and oriented to person, place, and time.       RESULTS   Results Review:      Results from last 7 days  Lab Units 17  0830 17  1220 17  1101   SODIUM mmol/L 139 137  137 138   POTASSIUM mmol/L 4.4 4.6  4.6 4.7   CHLORIDE mmol/L 104 104  104 103   CO2 mmol/L 22.0 21.0*  21.0* 23.0   BUN mg/dL 58* 61*  60* 63*   CREATININE mg/dL 6.43* 6.10*  6.10* 6.22*   CALCIUM mg/dL 8.9 9.1  9.0 9.5   BILIRUBIN mg/dL 0.4 0.6  --    ALK PHOS U/L 46 45  --    ALT (SGPT) U/L 33 39  --    AST (SGOT) U/L 19 21  --    GLUCOSE mg/dL 106* " 88  86 99       Estimated Creatinine Clearance: 14 mL/min (by C-G formula based on Cr of 6.43).      Results from last 7 days  Lab Units 08/22/17  1220 08/18/17  1101   PHOSPHORUS mg/dL 6.0* 6.4*               Results from last 7 days  Lab Units 08/23/17  0830 08/22/17  1230 08/18/17  1101   WBC 10*3/mm3 6.70 6.38  --    HEMOGLOBIN g/dL 9.6* 10.3* 10.9*   PLATELETS 10*3/mm3 193 209  --                Imaging Results (last 24 hours)     Procedure Component Value Units Date/Time    XR Chest PA & Lateral [[id number]] Collected:  08/22/17 1519     Updated:  08/22/17 1540    Narrative:         EXAM:          Radiograph(s), Chest   VIEWS:    PA / Lat ; 2       DATE/TIME:  8/22/2017 3:38 PM CDT               INDICATION:    dyspnea           COMPARISON:  CXR: 2/1/17          FINDINGS:             - lines/tubes:    none     - cardiac:         size within normal limits         - mediastinum: contour within normal limits         - lungs:         no focal air space process, pulmonary  interstitial edema, nodule(s)/mass             - pleura:         no evidence of  fluid                  - osseous:         unremarkable for age                  - misc.:         Impression:       CONCLUSION:        1. Negative examination.                                     Electronically signed by:  BIN Willis MD  8/22/2017 3:38  PM CDT Workstation: [id number]           MEDICATIONS      allopurinol 100 mg Oral Daily   amLODIPine 5 mg Oral Daily   bumetanide 2 mg Oral Daily   docusate sodium 100 mg Oral BID   famotidine 40 mg Oral Daily   fluticasone 1 spray Nasal Daily   levothyroxine 50 mcg Oral Q AM   metoprolol succinate  mg Oral Q24H   minoxidil 5 mg Oral Q24H   rosuvastatin 10 mg Oral Nightly          Assessment/Plan   ASSESSMENT / PLAN    Active Problems:    ESRD (end stage renal disease)    1.CKD 5 now worsening uremia with GFR of only 9.  Patient doesn't have any marked fluid overload but does have severe uremia and  shortness of breath on exertion.  we have attempted avf yesterday and today and unable to cannulate venous needle.    I have a long discussion with him and explain the need of tunnel cath and proceed with hd today after that. I have discussed with dr Shields. Will give fistula 3-4 weeks more to mature. He is not interested in transplant referral.     2.hypertension - his blood pressure is stable and I will keep metoprolol and amlodipine and minoxidil for now.  We may consider ACE inhibitor later     3.anemia of chronic kidney disease.  Patient hemoglobin is up to 10.9.  Continue with iron.  No plan for erythropoietin stimulating agents     4.secondary hyperparathyroidism patient phosphorus is high.  we may need to add PhosLo.  I'll observe it and review after dialysis.                This document has been electronically signed by Anca Merrill MD on August 23, 2017 9:26 AM          wine

## 2020-01-01 ENCOUNTER — HOSPITAL ENCOUNTER (OUTPATIENT)
Dept: CT IMAGING | Facility: HOSPITAL | Age: 70
Discharge: HOME | End: 2020-11-20
Admitting: NURSE PRACTITIONER

## 2020-01-01 ENCOUNTER — LAB (OUTPATIENT)
Dept: LAB | Facility: HOSPITAL | Age: 70
End: 2020-01-01

## 2020-01-01 ENCOUNTER — OFFICE VISIT (OUTPATIENT)
Dept: CARDIAC SURGERY | Facility: CLINIC | Age: 70
End: 2020-01-01

## 2020-01-01 ENCOUNTER — HOSPITAL ENCOUNTER (OUTPATIENT)
Facility: HOSPITAL | Age: 70
Setting detail: HOSPITAL OUTPATIENT SURGERY
Discharge: HOME | End: 2020-08-27
Attending: THORACIC SURGERY (CARDIOTHORACIC VASCULAR SURGERY) | Admitting: THORACIC SURGERY (CARDIOTHORACIC VASCULAR SURGERY)

## 2020-01-01 ENCOUNTER — TELEPHONE (OUTPATIENT)
Dept: CARDIAC SURGERY | Facility: CLINIC | Age: 70
End: 2020-01-01

## 2020-01-01 VITALS
HEART RATE: 91 BPM | HEIGHT: 71 IN | SYSTOLIC BLOOD PRESSURE: 139 MMHG | WEIGHT: 250 LBS | OXYGEN SATURATION: 98 % | DIASTOLIC BLOOD PRESSURE: 81 MMHG | BODY MASS INDEX: 35 KG/M2 | TEMPERATURE: 98.4 F

## 2020-01-01 VITALS
RESPIRATION RATE: 16 BRPM | TEMPERATURE: 97.3 F | DIASTOLIC BLOOD PRESSURE: 78 MMHG | HEART RATE: 75 BPM | SYSTOLIC BLOOD PRESSURE: 141 MMHG | WEIGHT: 244.05 LBS | BODY MASS INDEX: 34.17 KG/M2 | HEIGHT: 71 IN | OXYGEN SATURATION: 95 %

## 2020-01-01 VITALS
HEIGHT: 71 IN | BODY MASS INDEX: 34.66 KG/M2 | SYSTOLIC BLOOD PRESSURE: 142 MMHG | DIASTOLIC BLOOD PRESSURE: 84 MMHG | WEIGHT: 247.6 LBS | HEART RATE: 83 BPM | OXYGEN SATURATION: 98 %

## 2020-01-01 DIAGNOSIS — N18.6 ESRD (END STAGE RENAL DISEASE) ON DIALYSIS (HCC): ICD-10-CM

## 2020-01-01 DIAGNOSIS — N18.6 ESRD (END STAGE RENAL DISEASE) (HCC): Primary | ICD-10-CM

## 2020-01-01 DIAGNOSIS — I10 ESSENTIAL HYPERTENSION: ICD-10-CM

## 2020-01-01 DIAGNOSIS — I71.40 ABDOMINAL AORTIC ANEURYSM (AAA) WITHOUT RUPTURE (HCC): ICD-10-CM

## 2020-01-01 DIAGNOSIS — E66.9 OBESITY (BMI 30-39.9): ICD-10-CM

## 2020-01-01 DIAGNOSIS — Z99.2 ESRD (END STAGE RENAL DISEASE) ON DIALYSIS (HCC): ICD-10-CM

## 2020-01-01 DIAGNOSIS — T82.858A ARTERIOVENOUS FISTULA STENOSIS, INITIAL ENCOUNTER (HCC): Primary | ICD-10-CM

## 2020-01-01 DIAGNOSIS — T82.858A ARTERIOVENOUS FISTULA STENOSIS, INITIAL ENCOUNTER (HCC): ICD-10-CM

## 2020-01-01 DIAGNOSIS — I77.0 A-V FISTULA (HCC): Primary | ICD-10-CM

## 2020-01-01 DIAGNOSIS — I77.0 ARTERIOVENOUS FISTULA, ACQUIRED (HCC): ICD-10-CM

## 2020-01-01 DIAGNOSIS — I71.40 ABDOMINAL AORTIC ANEURYSM (AAA) WITHOUT RUPTURE (HCC): Primary | ICD-10-CM

## 2020-01-01 LAB
COVID LABCORP PRIORITY: NORMAL
SARS-COV-2 RNA RESP QL NAA+PROBE: NOT DETECTED

## 2020-01-01 PROCEDURE — U0003 INFECTIOUS AGENT DETECTION BY NUCLEIC ACID (DNA OR RNA); SEVERE ACUTE RESPIRATORY SYNDROME CORONAVIRUS 2 (SARS-COV-2) (CORONAVIRUS DISEASE [COVID-19]), AMPLIFIED PROBE TECHNIQUE, MAKING USE OF HIGH THROUGHPUT TECHNOLOGIES AS DESCRIBED BY CMS-2020-01-R: HCPCS | Performed by: THORACIC SURGERY (CARDIOTHORACIC VASCULAR SURGERY)

## 2020-01-01 PROCEDURE — 99024 POSTOP FOLLOW-UP VISIT: CPT | Performed by: NURSE PRACTITIONER

## 2020-01-01 PROCEDURE — 25010000002 IOPAMIDOL 61 % SOLUTION: Performed by: THORACIC SURGERY (CARDIOTHORACIC VASCULAR SURGERY)

## 2020-01-01 PROCEDURE — 99214 OFFICE O/P EST MOD 30 MIN: CPT | Performed by: NURSE PRACTITIONER

## 2020-01-01 PROCEDURE — C9803 HOPD COVID-19 SPEC COLLECT: HCPCS

## 2020-01-01 PROCEDURE — 25010000002 FENTANYL CITRATE (PF) 100 MCG/2ML SOLUTION: Performed by: THORACIC SURGERY (CARDIOTHORACIC VASCULAR SURGERY)

## 2020-01-01 PROCEDURE — C1894 INTRO/SHEATH, NON-LASER: HCPCS | Performed by: THORACIC SURGERY (CARDIOTHORACIC VASCULAR SURGERY)

## 2020-01-01 PROCEDURE — 36902 INTRO CATH DIALYSIS CIRCUIT: CPT | Performed by: THORACIC SURGERY (CARDIOTHORACIC VASCULAR SURGERY)

## 2020-01-01 PROCEDURE — C1725 CATH, TRANSLUMIN NON-LASER: HCPCS | Performed by: THORACIC SURGERY (CARDIOTHORACIC VASCULAR SURGERY)

## 2020-01-01 PROCEDURE — 25010000002 HEPARIN (PORCINE) PER 1000 UNITS: Performed by: THORACIC SURGERY (CARDIOTHORACIC VASCULAR SURGERY)

## 2020-01-01 PROCEDURE — 74178 CT ABD&PLV WO CNTR FLWD CNTR: CPT

## 2020-01-01 PROCEDURE — 25010000002 IOPAMIDOL 61 % SOLUTION: Performed by: NURSE PRACTITIONER

## 2020-01-01 PROCEDURE — 25010000002 MIDAZOLAM PER 1 MG: Performed by: THORACIC SURGERY (CARDIOTHORACIC VASCULAR SURGERY)

## 2020-01-01 PROCEDURE — C9803 HOPD COVID-19 SPEC COLLECT: HCPCS | Performed by: THORACIC SURGERY (CARDIOTHORACIC VASCULAR SURGERY)

## 2020-01-01 PROCEDURE — C1769 GUIDE WIRE: HCPCS | Performed by: THORACIC SURGERY (CARDIOTHORACIC VASCULAR SURGERY)

## 2020-01-01 RX ORDER — HEPARIN SODIUM 1000 [USP'U]/ML
INJECTION, SOLUTION INTRAVENOUS; SUBCUTANEOUS AS NEEDED
Status: DISCONTINUED | OUTPATIENT
Start: 2020-01-01 | End: 2020-01-01 | Stop reason: HOSPADM

## 2020-01-01 RX ORDER — BUPIVACAINE HCL/0.9 % NACL/PF 0.1 %
2 PLASTIC BAG, INJECTION (ML) EPIDURAL ONCE
Status: DISCONTINUED | OUTPATIENT
Start: 2020-01-01 | End: 2020-01-01 | Stop reason: HOSPADM

## 2020-01-01 RX ORDER — LIDOCAINE HYDROCHLORIDE 20 MG/ML
INJECTION, SOLUTION INFILTRATION; PERINEURAL
Status: DISCONTINUED
Start: 2020-01-01 | End: 2020-01-01 | Stop reason: HOSPADM

## 2020-01-01 RX ORDER — SODIUM CHLORIDE 9 MG/ML
30 INJECTION, SOLUTION INTRAVENOUS CONTINUOUS
Status: DISCONTINUED | OUTPATIENT
Start: 2020-01-01 | End: 2020-01-01 | Stop reason: HOSPADM

## 2020-01-01 RX ORDER — HEPARIN SODIUM 1000 [USP'U]/ML
INJECTION, SOLUTION INTRAVENOUS; SUBCUTANEOUS
Status: DISCONTINUED
Start: 2020-01-01 | End: 2020-01-01 | Stop reason: HOSPADM

## 2020-01-01 RX ORDER — MIDAZOLAM HYDROCHLORIDE 1 MG/ML
INJECTION INTRAMUSCULAR; INTRAVENOUS
Status: DISCONTINUED
Start: 2020-01-01 | End: 2020-01-01 | Stop reason: HOSPADM

## 2020-01-01 RX ORDER — SODIUM CHLORIDE 9 MG/ML
30 INJECTION, SOLUTION INTRAVENOUS CONTINUOUS
Status: CANCELLED | OUTPATIENT
Start: 2020-01-01

## 2020-01-01 RX ORDER — ACETAMINOPHEN 325 MG/1
650 TABLET ORAL EVERY 4 HOURS PRN
Status: CANCELLED | OUTPATIENT
Start: 2020-01-01

## 2020-01-01 RX ORDER — FENTANYL CITRATE 50 UG/ML
INJECTION, SOLUTION INTRAMUSCULAR; INTRAVENOUS
Status: DISCONTINUED
Start: 2020-01-01 | End: 2020-01-01 | Stop reason: HOSPADM

## 2020-01-01 RX ORDER — BUPIVACAINE HCL/0.9 % NACL/PF 0.1 %
2 PLASTIC BAG, INJECTION (ML) EPIDURAL ONCE
Status: CANCELLED | OUTPATIENT
Start: 2020-01-01 | End: 2020-01-01

## 2020-01-01 RX ORDER — FENTANYL CITRATE 50 UG/ML
INJECTION, SOLUTION INTRAMUSCULAR; INTRAVENOUS AS NEEDED
Status: DISCONTINUED | OUTPATIENT
Start: 2020-01-01 | End: 2020-01-01 | Stop reason: HOSPADM

## 2020-01-01 RX ORDER — MIDAZOLAM HYDROCHLORIDE 1 MG/ML
INJECTION INTRAMUSCULAR; INTRAVENOUS AS NEEDED
Status: DISCONTINUED | OUTPATIENT
Start: 2020-01-01 | End: 2020-01-01 | Stop reason: HOSPADM

## 2020-01-01 RX ADMIN — IOPAMIDOL 90 ML: 612 INJECTION, SOLUTION INTRAVENOUS at 09:30

## 2020-02-19 RX ORDER — CYCLOBENZAPRINE HCL 10 MG
TABLET ORAL
Qty: 30 TABLET | Refills: 0 | Status: SHIPPED | OUTPATIENT
Start: 2020-02-19 | End: 2021-01-01

## 2020-06-03 ENCOUNTER — TELEPHONE (OUTPATIENT)
Dept: CARDIAC SURGERY | Facility: CLINIC | Age: 70
End: 2020-06-03

## 2020-06-03 ENCOUNTER — PREP FOR SURGERY (OUTPATIENT)
Dept: OTHER | Facility: HOSPITAL | Age: 70
End: 2020-06-03

## 2020-06-03 DIAGNOSIS — Z99.2 ESRD (END STAGE RENAL DISEASE) ON DIALYSIS (HCC): Primary | ICD-10-CM

## 2020-06-03 DIAGNOSIS — N18.6 ESRD (END STAGE RENAL DISEASE) ON DIALYSIS (HCC): Primary | ICD-10-CM

## 2020-06-03 RX ORDER — SODIUM CHLORIDE 0.9 % (FLUSH) 0.9 %
3 SYRINGE (ML) INJECTION EVERY 12 HOURS SCHEDULED
Status: CANCELLED | OUTPATIENT
Start: 2020-06-05

## 2020-06-03 RX ORDER — SODIUM CHLORIDE 0.9 % (FLUSH) 0.9 %
10 SYRINGE (ML) INJECTION AS NEEDED
Status: CANCELLED | OUTPATIENT
Start: 2020-06-05

## 2020-06-03 RX ORDER — SODIUM CHLORIDE 9 MG/ML
50 INJECTION, SOLUTION INTRAVENOUS CONTINUOUS
Status: CANCELLED | OUTPATIENT
Start: 2020-06-05

## 2020-06-03 NOTE — TELEPHONE ENCOUNTER
Called pt to notify him of date and time of fistulagram on 6/5/20  pt verbalized understanding. I also called and spoke to glendy at [facility] to notify her as well

## 2020-06-04 LAB — SARS-COV-2 RNA PNL SPEC NAA+PROBE: NOT DETECTED

## 2020-06-04 PROCEDURE — 87635 SARS-COV-2 COVID-19 AMP PRB: CPT | Performed by: THORACIC SURGERY (CARDIOTHORACIC VASCULAR SURGERY)

## 2020-06-05 ENCOUNTER — HOSPITAL ENCOUNTER (OUTPATIENT)
Facility: HOSPITAL | Age: 70
Setting detail: HOSPITAL OUTPATIENT SURGERY
Discharge: HOME | End: 2020-06-05
Attending: THORACIC SURGERY (CARDIOTHORACIC VASCULAR SURGERY) | Admitting: THORACIC SURGERY (CARDIOTHORACIC VASCULAR SURGERY)

## 2020-06-05 VITALS
OXYGEN SATURATION: 94 % | HEART RATE: 79 BPM | BODY MASS INDEX: 34.14 KG/M2 | RESPIRATION RATE: 18 BRPM | WEIGHT: 243.83 LBS | SYSTOLIC BLOOD PRESSURE: 115 MMHG | HEIGHT: 71 IN | TEMPERATURE: 97.3 F | DIASTOLIC BLOOD PRESSURE: 78 MMHG

## 2020-06-05 DIAGNOSIS — N18.6 ESRD (END STAGE RENAL DISEASE) ON DIALYSIS (HCC): ICD-10-CM

## 2020-06-05 DIAGNOSIS — Z99.2 ESRD (END STAGE RENAL DISEASE) ON DIALYSIS (HCC): ICD-10-CM

## 2020-06-05 PROCEDURE — 25010000002 MIDAZOLAM PER 1 MG: Performed by: THORACIC SURGERY (CARDIOTHORACIC VASCULAR SURGERY)

## 2020-06-05 PROCEDURE — 25010000002 IOPAMIDOL 61 % SOLUTION: Performed by: THORACIC SURGERY (CARDIOTHORACIC VASCULAR SURGERY)

## 2020-06-05 PROCEDURE — 25010000002 FENTANYL CITRATE (PF) 100 MCG/2ML SOLUTION: Performed by: THORACIC SURGERY (CARDIOTHORACIC VASCULAR SURGERY)

## 2020-06-05 PROCEDURE — C1894 INTRO/SHEATH, NON-LASER: HCPCS | Performed by: THORACIC SURGERY (CARDIOTHORACIC VASCULAR SURGERY)

## 2020-06-05 PROCEDURE — S0260 H&P FOR SURGERY: HCPCS | Performed by: THORACIC SURGERY (CARDIOTHORACIC VASCULAR SURGERY)

## 2020-06-05 PROCEDURE — 36901 INTRO CATH DIALYSIS CIRCUIT: CPT | Performed by: THORACIC SURGERY (CARDIOTHORACIC VASCULAR SURGERY)

## 2020-06-05 RX ORDER — HEPARIN SODIUM 1000 [USP'U]/ML
INJECTION, SOLUTION INTRAVENOUS; SUBCUTANEOUS
Status: DISCONTINUED
Start: 2020-06-05 | End: 2020-06-05 | Stop reason: HOSPADM

## 2020-06-05 RX ORDER — FENTANYL CITRATE 50 UG/ML
INJECTION, SOLUTION INTRAMUSCULAR; INTRAVENOUS
Status: DISCONTINUED
Start: 2020-06-05 | End: 2020-06-05 | Stop reason: HOSPADM

## 2020-06-05 RX ORDER — LIDOCAINE HYDROCHLORIDE 20 MG/ML
INJECTION, SOLUTION EPIDURAL; INFILTRATION; INTRACAUDAL; PERINEURAL AS NEEDED
Status: DISCONTINUED | OUTPATIENT
Start: 2020-06-05 | End: 2020-06-05 | Stop reason: HOSPADM

## 2020-06-05 RX ORDER — SODIUM CHLORIDE 0.9 % (FLUSH) 0.9 %
10 SYRINGE (ML) INJECTION AS NEEDED
Status: DISCONTINUED | OUTPATIENT
Start: 2020-06-05 | End: 2020-06-05 | Stop reason: HOSPADM

## 2020-06-05 RX ORDER — MIDAZOLAM HYDROCHLORIDE 1 MG/ML
INJECTION INTRAMUSCULAR; INTRAVENOUS
Status: DISCONTINUED
Start: 2020-06-05 | End: 2020-06-05 | Stop reason: HOSPADM

## 2020-06-05 RX ORDER — MIDAZOLAM HYDROCHLORIDE 1 MG/ML
INJECTION INTRAMUSCULAR; INTRAVENOUS AS NEEDED
Status: DISCONTINUED | OUTPATIENT
Start: 2020-06-05 | End: 2020-06-05 | Stop reason: HOSPADM

## 2020-06-05 RX ORDER — FENTANYL CITRATE 50 UG/ML
INJECTION, SOLUTION INTRAMUSCULAR; INTRAVENOUS AS NEEDED
Status: DISCONTINUED | OUTPATIENT
Start: 2020-06-05 | End: 2020-06-05 | Stop reason: HOSPADM

## 2020-06-05 RX ORDER — SODIUM CHLORIDE 0.9 % (FLUSH) 0.9 %
3 SYRINGE (ML) INJECTION EVERY 12 HOURS SCHEDULED
Status: DISCONTINUED | OUTPATIENT
Start: 2020-06-05 | End: 2020-06-05 | Stop reason: HOSPADM

## 2020-06-05 RX ORDER — ACETAMINOPHEN 325 MG/1
650 TABLET ORAL EVERY 4 HOURS PRN
Status: DISCONTINUED | OUTPATIENT
Start: 2020-06-05 | End: 2020-06-05 | Stop reason: HOSPADM

## 2020-06-05 RX ORDER — SODIUM CHLORIDE 9 MG/ML
50 INJECTION, SOLUTION INTRAVENOUS CONTINUOUS
Status: DISCONTINUED | OUTPATIENT
Start: 2020-06-05 | End: 2020-06-05 | Stop reason: HOSPADM

## 2020-06-05 RX ORDER — LIDOCAINE HYDROCHLORIDE 20 MG/ML
INJECTION, SOLUTION INFILTRATION; PERINEURAL
Status: DISCONTINUED
Start: 2020-06-05 | End: 2020-06-05 | Stop reason: HOSPADM

## 2020-06-05 NOTE — H&P (VIEW-ONLY)
Jesús Austin  1950     Chief Complaint: ESRD     HPI:       PCP:  Luis Miguel Orourke MD  Nephrology:  Dr Merrill     67 y.o. male with HTN(uncontrolled, increased risk stroke), AAA(stable, increased risk rupture), ESRD(stable, hemodialysis, increased risk cardiovascular events), Carotid Stenosis(stable, increased risk stroke).  never smoked.  Started dialysis 8/2017 via tunnel cath.  Immature radial AVF.  Now functional, no problems at dialysis. .  No other associated signs, symptoms or modifying factors.     7/5/2017 LEFT radial artery to cephalic vein forearm AV fistula([facility])  8/2017 placement tunneled dialysis catheter  8/2017 ECG:  NSR 70, QTc 451, RBBB     The following portions of the patient's history were reviewed and updated as appropriate: allergies, current medications, past family history, past medical history, past social history, past surgical history and problem list.  Recent images independently reviewed.  Available laboratory values reviewed.     PMH:  Medical History        Past Medical History:   Diagnosis Date   • Abdominal aortic aneurysm     • Allergic rhinitis     • Arthritis     • Carotid artery stenosis     • Degenerative joint disease involving multiple joints     • Disease of thyroid gland     • Diverticulitis     • Dyslipidemia     • Eustachian tube disorder     • Hypertension     • Kidney stone     • Malignant tumor of Meckel's diverticulum     • Myopic astigmatism     • Rheumatic fever              ALLERGIES:        Allergies   Allergen Reactions   • Morphine Nausea And Vomiting   • Tape Rash       Just plastic tape            MEDICATIONS:     Current Outpatient Prescriptions:   •  acetaminophen (TYLENOL) 650 MG 8 hr tablet, Take 650 mg by mouth Every 8 (Eight) Hours As Needed for Mild Pain ., Disp: , Rfl:   •  albuterol (PROVENTIL HFA;VENTOLIN HFA) 108 (90 BASE) MCG/ACT inhaler, Inhale 2 puffs Every 4 (Four) Hours As Needed for wheezing or shortness of air., Disp: 1 inhaler,  Rfl: 0  •  allopurinol (ZYLOPRIM) 100 MG tablet, Take 100 mg by mouth., Disp: , Rfl:   •  benzonatate (TESSALON) 100 MG capsule, Take 2 capsules by mouth 3 (Three) Times a Day As Needed for cough., Disp: 30 capsule, Rfl: 0  •  bumetanide (BUMEX) 2 MG tablet, Take 2 mg by mouth Daily., Disp: , Rfl:   •  CIMETIDINE ACID REDUCER PO, Take  by mouth 2 (Two) Times a Day., Disp: , Rfl:   •  CO ENZYME Q-10 PO, Take 100 mg by mouth Daily., Disp: , Rfl:   •  cyclobenzaprine (FLEXERIL) 10 MG tablet, Take 10 mg by mouth 3 (Three) Times a Day As Needed., Disp: , Rfl:   •  DHA-EPA-VITAMIN E PO, Take  by mouth., Disp: , Rfl:   •  DOCUSATE SODIUM PO, Take  by mouth Daily., Disp: , Rfl:   •  fluticasone (FLONASE) 50 MCG/ACT nasal spray, 1 spray into each nostril., Disp: , Rfl:   •  guaiFENesin (MUCINEX) 600 MG 12 hr tablet, Take 2 tablets by mouth 2 (Two) Times a Day., Disp: 40 tablet, Rfl: 0  •  levothyroxine (SYNTHROID, LEVOTHROID) 50 MCG tablet, Take 50 mcg by mouth., Disp: , Rfl:   •  Multiple Vitamins-Minerals (MULTIVITAMIN ADULT PO), Take 1 tablet by mouth Daily., Disp: , Rfl:   •  Omega-3 Fatty Acids (FISH OIL) 1000 MG capsule capsule, Take 1,200 mg by mouth Every Night., Disp: , Rfl:   •  rosuvastatin (CRESTOR) 10 MG tablet, Take 10 mg by mouth., Disp: , Rfl:      Review of Systems   Review of Systems   Constitution: Positive for malaise/fatigue. Negative for weakness and weight loss.   Cardiovascular: Negative for chest pain, claudication and dyspnea on exertion.   Respiratory: Negative for cough and shortness of breath.    Skin: Negative for color change and poor wound healing.   Musculoskeletal: Positive for arthritis.   Neurological: Negative for dizziness and numbness.         Physical Exam   Physical Exam   Constitutional: He is oriented to person, place, and time. He is active and cooperative. He does not appear ill. No distress.   HENT:   Right Ear: Hearing normal.   Left Ear: Hearing normal.   Nose: No nasal  deformity. No epistaxis.   Mouth/Throat: He does not have dentures. Normal dentition.   Cardiovascular: Normal rate and regular rhythm.    No murmur heard.  Pulses:       Carotid pulses are 2+ on the right side, and 2+ on the left side.       Radial pulses are 2+ on the right side, and 2+ on the left side.        Dorsalis pedis pulses are 2+ on the right side, and 2+ on the left side.   Good thrill LEFT forearm fistula  Tunnel cath site clean and dry   Pulmonary/Chest: Effort normal and breath sounds normal.   Abdominal: Soft. He exhibits no distension and no mass. There is no tenderness.   Musculoskeletal: He exhibits no deformity.   Gait normal.    Neurological: He is alert and oriented to person, place, and time. He has normal strength.   Skin: Skin is warm and dry. No cyanosis or erythema. No pallor.   No venous staining   Psychiatric: He has a normal mood and affect. His speech is normal. Judgment and thought content normal.      Results for AILYN AUSTIN (MRN [id number]) as of 2/10/2018 18:54    Ref. Range 8/25/2017 08:12   Creatinine Latest Ref Range: 0.70 - 1.30 mg/dL 4.98 (H)   BUN Latest Ref Range: 7 - 21 mg/dL 34 (H)      ASSESSMENT:  Diagnoses and all orders for this visit:     ESRD (end stage renal disease)     Abdominal aortic aneurysm (AAA) without rupture     Bilateral carotid artery stenosis     Essential hypertension     Primary osteoarthritis involving multiple joints  PLAN:  Detailed discussion with Ailyn Austin regarding situation and options.  Functional long term dialysis access.  No longer requires dialysis.  ready for tunnel cath removal.       Detailed discussion with Ailyn Austin regarding situation and options.  functional problems at dialysis (increased venous pressures, recirculation, low flows on circuit, difficulty with bleeding after treatment)    Risks including but not limited to bleeding, infection, blood vessel or nerve injury, inability to maintain patent graft, need  for tunnel catheter placement, open revision of fistula.  Benefits:  maintenance of functional dialysis access.  Options:  surgical vs endovascular evaluation and treatment.  Understands and wishes to proceed.    LEFT fistulagram, possible thrombolysis, angioplasty, stent, tunnel cath.  Local/IVsedation.  SDS.  6/5/2020    Return after above studies complete  Recommended regular physical activity, progressive walking program.  Continue current medications as directed.     Thank you for the opportunity to participate in this patient's care.     Copy to primary care provider.     EMR Dragon/Transcription disclaimer:   Much of this encounter note is an electronic transcription/translation of spoken language to printed text. The electronic translation of spoken language may permit erroneous, or at times, nonsensical words or phrases to be inadvertently transcribed; Although I have reviewed the note for such errors, some may still exist.

## 2020-06-05 NOTE — DISCHARGE INSTR - APPOINTMENTS
Dr. Shields's office should be calling to set up surgery date and time.  If you have not heard from them by Tuesday, call his office.

## 2020-06-05 NOTE — H&P
Jesús Austin  1950     Chief Complaint: ESRD     HPI:       PCP:  Luis Miguel Orourke MD  Nephrology:  Dr Merrill     67 y.o. male with HTN(uncontrolled, increased risk stroke), AAA(stable, increased risk rupture), ESRD(stable, hemodialysis, increased risk cardiovascular events), Carotid Stenosis(stable, increased risk stroke).  never smoked.  Started dialysis 8/2017 via tunnel cath.  Immature radial AVF.  Now functional, no problems at dialysis. .  No other associated signs, symptoms or modifying factors.     7/5/2017 LEFT radial artery to cephalic vein forearm AV fistula([place])  8/2017 placement tunneled dialysis catheter  8/2017 ECG:  NSR 70, QTc 451, RBBB     The following portions of the patient's history were reviewed and updated as appropriate: allergies, current medications, past family history, past medical history, past social history, past surgical history and problem list.  Recent images independently reviewed.  Available laboratory values reviewed.     PMH:  Medical History        Past Medical History:   Diagnosis Date   • Abdominal aortic aneurysm     • Allergic rhinitis     • Arthritis     • Carotid artery stenosis     • Degenerative joint disease involving multiple joints     • Disease of thyroid gland     • Diverticulitis     • Dyslipidemia     • Eustachian tube disorder     • Hypertension     • Kidney stone     • Malignant tumor of Meckel's diverticulum     • Myopic astigmatism     • Rheumatic fever              ALLERGIES:        Allergies   Allergen Reactions   • Morphine Nausea And Vomiting   • Tape Rash       Just plastic tape            MEDICATIONS:     Current Outpatient Prescriptions:   •  acetaminophen (TYLENOL) 650 MG 8 hr tablet, Take 650 mg by mouth Every 8 (Eight) Hours As Needed for Mild Pain ., Disp: , Rfl:   •  albuterol (PROVENTIL HFA;VENTOLIN HFA) 108 (90 BASE) MCG/ACT inhaler, Inhale 2 puffs Every 4 (Four) Hours As Needed for wheezing or shortness of air., Disp: 1 inhaler,  Rfl: 0  •  allopurinol (ZYLOPRIM) 100 MG tablet, Take 100 mg by mouth., Disp: , Rfl:   •  benzonatate (TESSALON) 100 MG capsule, Take 2 capsules by mouth 3 (Three) Times a Day As Needed for cough., Disp: 30 capsule, Rfl: 0  •  bumetanide (BUMEX) 2 MG tablet, Take 2 mg by mouth Daily., Disp: , Rfl:   •  CIMETIDINE ACID REDUCER PO, Take  by mouth 2 (Two) Times a Day., Disp: , Rfl:   •  CO ENZYME Q-10 PO, Take 100 mg by mouth Daily., Disp: , Rfl:   •  cyclobenzaprine (FLEXERIL) 10 MG tablet, Take 10 mg by mouth 3 (Three) Times a Day As Needed., Disp: , Rfl:   •  DHA-EPA-VITAMIN E PO, Take  by mouth., Disp: , Rfl:   •  DOCUSATE SODIUM PO, Take  by mouth Daily., Disp: , Rfl:   •  fluticasone (FLONASE) 50 MCG/ACT nasal spray, 1 spray into each nostril., Disp: , Rfl:   •  guaiFENesin (MUCINEX) 600 MG 12 hr tablet, Take 2 tablets by mouth 2 (Two) Times a Day., Disp: 40 tablet, Rfl: 0  •  levothyroxine (SYNTHROID, LEVOTHROID) 50 MCG tablet, Take 50 mcg by mouth., Disp: , Rfl:   •  Multiple Vitamins-Minerals (MULTIVITAMIN ADULT PO), Take 1 tablet by mouth Daily., Disp: , Rfl:   •  Omega-3 Fatty Acids (FISH OIL) 1000 MG capsule capsule, Take 1,200 mg by mouth Every Night., Disp: , Rfl:   •  rosuvastatin (CRESTOR) 10 MG tablet, Take 10 mg by mouth., Disp: , Rfl:      Review of Systems   Review of Systems   Constitution: Positive for malaise/fatigue. Negative for weakness and weight loss.   Cardiovascular: Negative for chest pain, claudication and dyspnea on exertion.   Respiratory: Negative for cough and shortness of breath.    Skin: Negative for color change and poor wound healing.   Musculoskeletal: Positive for arthritis.   Neurological: Negative for dizziness and numbness.         Physical Exam   Physical Exam   Constitutional: He is oriented to person, place, and time. He is active and cooperative. He does not appear ill. No distress.   HENT:   Right Ear: Hearing normal.   Left Ear: Hearing normal.   Nose: No nasal  deformity. No epistaxis.   Mouth/Throat: He does not have dentures. Normal dentition.   Cardiovascular: Normal rate and regular rhythm.    No murmur heard.  Pulses:       Carotid pulses are 2+ on the right side, and 2+ on the left side.       Radial pulses are 2+ on the right side, and 2+ on the left side.        Dorsalis pedis pulses are 2+ on the right side, and 2+ on the left side.   Good thrill LEFT forearm fistula  Tunnel cath site clean and dry   Pulmonary/Chest: Effort normal and breath sounds normal.   Abdominal: Soft. He exhibits no distension and no mass. There is no tenderness.   Musculoskeletal: He exhibits no deformity.   Gait normal.    Neurological: He is alert and oriented to person, place, and time. He has normal strength.   Skin: Skin is warm and dry. No cyanosis or erythema. No pallor.   No venous staining   Psychiatric: He has a normal mood and affect. His speech is normal. Judgment and thought content normal.      Results for AILYN AUSTIN (MRN [id number]) as of 2/10/2018 18:54    Ref. Range 8/25/2017 08:12   Creatinine Latest Ref Range: 0.70 - 1.30 mg/dL 4.98 (H)   BUN Latest Ref Range: 7 - 21 mg/dL 34 (H)      ASSESSMENT:  Diagnoses and all orders for this visit:     ESRD (end stage renal disease)     Abdominal aortic aneurysm (AAA) without rupture     Bilateral carotid artery stenosis     Essential hypertension     Primary osteoarthritis involving multiple joints  PLAN:  Detailed discussion with Ailyn Austin regarding situation and options.  Functional long term dialysis access.  No longer requires dialysis.  ready for tunnel cath removal.       Detailed discussion with Ailyn Austin regarding situation and options.  functional problems at dialysis (increased venous pressures, recirculation, low flows on circuit, difficulty with bleeding after treatment)    Risks including but not limited to bleeding, infection, blood vessel or nerve injury, inability to maintain patent graft, need  for tunnel catheter placement, open revision of fistula.  Benefits:  maintenance of functional dialysis access.  Options:  surgical vs endovascular evaluation and treatment.  Understands and wishes to proceed.    LEFT fistulagram, possible thrombolysis, angioplasty, stent, tunnel cath.  Local/IVsedation.  SDS.  6/5/2020    Return after above studies complete  Recommended regular physical activity, progressive walking program.  Continue current medications as directed.     Thank you for the opportunity to participate in this patient's care.     Copy to primary care provider.     EMR Dragon/Transcription disclaimer:   Much of this encounter note is an electronic transcription/translation of spoken language to printed text. The electronic translation of spoken language may permit erroneous, or at times, nonsensical words or phrases to be inadvertently transcribed; Although I have reviewed the note for such errors, some may still exist.

## 2020-06-11 ENCOUNTER — PREP FOR SURGERY (OUTPATIENT)
Dept: OTHER | Facility: HOSPITAL | Age: 70
End: 2020-06-11

## 2020-06-11 ENCOUNTER — TELEPHONE (OUTPATIENT)
Dept: CARDIAC SURGERY | Facility: CLINIC | Age: 70
End: 2020-06-11

## 2020-06-11 DIAGNOSIS — Z99.2 ESRD (END STAGE RENAL DISEASE) ON DIALYSIS (HCC): Primary | ICD-10-CM

## 2020-06-11 DIAGNOSIS — N18.6 ESRD (END STAGE RENAL DISEASE) ON DIALYSIS (HCC): Primary | ICD-10-CM

## 2020-06-11 RX ORDER — SODIUM CHLORIDE 0.9 % (FLUSH) 0.9 %
10 SYRINGE (ML) INJECTION AS NEEDED
Status: CANCELLED | OUTPATIENT
Start: 2020-06-17

## 2020-06-11 RX ORDER — SODIUM CHLORIDE 9 MG/ML
50 INJECTION, SOLUTION INTRAVENOUS CONTINUOUS
Status: CANCELLED | OUTPATIENT
Start: 2020-06-17

## 2020-06-11 RX ORDER — SODIUM CHLORIDE 0.9 % (FLUSH) 0.9 %
3 SYRINGE (ML) INJECTION EVERY 12 HOURS SCHEDULED
Status: CANCELLED | OUTPATIENT
Start: 2020-06-17

## 2020-06-11 RX ORDER — BUPIVACAINE HCL/0.9 % NACL/PF 0.1 %
2 PLASTIC BAG, INJECTION (ML) EPIDURAL ONCE
Status: CANCELLED | OUTPATIENT
Start: 2020-06-17 | End: 2020-06-11

## 2020-06-11 NOTE — TELEPHONE ENCOUNTER
Contacted patient to inform of procedure scheduled for Wednesday 6/17/2020, spoke with patient's wife. Instructions given for COVID testing on Sunday 6/14 between 9-10am and over the phone preop testing. Verbalized understanding.        ----- Message from Inés Valladares sent at 6/10/2020 10:45 AM CDT -----  Contact: [phone]  Pt's wife has called about getting Mr. Asutin's other procedure scheduled.  Her phone number is [phone].

## 2020-06-14 LAB — SARS-COV-2 N GENE RESP QL NAA+PROBE: NOT DETECTED

## 2020-06-14 PROCEDURE — 87635 SARS-COV-2 COVID-19 AMP PRB: CPT | Performed by: THORACIC SURGERY (CARDIOTHORACIC VASCULAR SURGERY)

## 2020-06-15 RX ORDER — OMEPRAZOLE 20 MG/1
20 CAPSULE, DELAYED RELEASE ORAL DAILY
COMMUNITY

## 2020-06-15 RX ORDER — ASPIRIN 81 MG/1
81 TABLET ORAL DAILY
COMMUNITY

## 2020-06-15 RX ORDER — AMITRIPTYLINE HYDROCHLORIDE 10 MG/1
10 TABLET, FILM COATED ORAL NIGHTLY
COMMUNITY
End: 2021-01-01

## 2020-06-15 RX ORDER — LORATADINE 10 MG/1
10 CAPSULE, LIQUID FILLED ORAL DAILY PRN
Status: ON HOLD | COMMUNITY
End: 2021-01-01

## 2020-06-15 RX ORDER — SEVELAMER CARBONATE 800 MG/1
2400 TABLET, FILM COATED ORAL
COMMUNITY

## 2020-06-15 RX ORDER — METOPROLOL TARTRATE 25 MG/1
25 TABLET, FILM COATED ORAL DAILY
Status: ON HOLD | COMMUNITY
End: 2021-01-01

## 2020-06-15 RX ORDER — CALCIUM ACETATE 667 MG/1
667 TABLET ORAL
COMMUNITY
End: 2021-01-01

## 2020-06-17 ENCOUNTER — ANESTHESIA EVENT (OUTPATIENT)
Dept: PERIOP | Facility: HOSPITAL | Age: 70
End: 2020-06-17

## 2020-06-17 ENCOUNTER — ANESTHESIA (OUTPATIENT)
Dept: PERIOP | Facility: HOSPITAL | Age: 70
End: 2020-06-17

## 2020-06-17 ENCOUNTER — HOSPITAL ENCOUNTER (OUTPATIENT)
Facility: HOSPITAL | Age: 70
Setting detail: HOSPITAL OUTPATIENT SURGERY
Discharge: HOME | End: 2020-06-17
Attending: THORACIC SURGERY (CARDIOTHORACIC VASCULAR SURGERY) | Admitting: THORACIC SURGERY (CARDIOTHORACIC VASCULAR SURGERY)

## 2020-06-17 VITALS
OXYGEN SATURATION: 93 % | BODY MASS INDEX: 34.26 KG/M2 | TEMPERATURE: 97.3 F | RESPIRATION RATE: 18 BRPM | HEIGHT: 71 IN | WEIGHT: 244.71 LBS | DIASTOLIC BLOOD PRESSURE: 67 MMHG | SYSTOLIC BLOOD PRESSURE: 132 MMHG | HEART RATE: 84 BPM

## 2020-06-17 DIAGNOSIS — Z99.2 ESRD (END STAGE RENAL DISEASE) ON DIALYSIS (HCC): ICD-10-CM

## 2020-06-17 DIAGNOSIS — N18.6 ESRD (END STAGE RENAL DISEASE) ON DIALYSIS (HCC): ICD-10-CM

## 2020-06-17 LAB
ANION GAP SERPL CALCULATED.3IONS-SCNC: 9 MMOL/L (ref 5–15)
BUN BLD-MCNC: 31 MG/DL (ref 8–23)
BUN/CREAT SERPL: 7.1 (ref 7–25)
CALCIUM SPEC-SCNC: 10 MG/DL (ref 8.6–10.5)
CHLORIDE SERPL-SCNC: 90 MMOL/L (ref 98–107)
CO2 SERPL-SCNC: 28 MMOL/L (ref 22–29)
CREAT BLD-MCNC: 4.36 MG/DL (ref 0.76–1.27)
GFR SERPL CREATININE-BSD FRML MDRD: 13 ML/MIN/1.73
GFR SERPL CREATININE-BSD FRML MDRD: ABNORMAL ML/MIN/{1.73_M2}
GLUCOSE BLD-MCNC: 97 MG/DL (ref 65–99)
POTASSIUM BLD-SCNC: 4.3 MMOL/L (ref 3.5–5.2)
SODIUM BLD-SCNC: 127 MMOL/L (ref 136–145)

## 2020-06-17 PROCEDURE — 25010000002 PHENYLEPHRINE PER 1 ML: Performed by: NURSE ANESTHETIST, CERTIFIED REGISTERED

## 2020-06-17 PROCEDURE — 36832 AV FISTULA REVISION OPEN: CPT | Performed by: THORACIC SURGERY (CARDIOTHORACIC VASCULAR SURGERY)

## 2020-06-17 PROCEDURE — 93010 ELECTROCARDIOGRAM REPORT: CPT | Performed by: INTERNAL MEDICINE

## 2020-06-17 PROCEDURE — 25010000002 PROPOFOL 10 MG/ML EMULSION: Performed by: NURSE ANESTHETIST, CERTIFIED REGISTERED

## 2020-06-17 PROCEDURE — 93005 ELECTROCARDIOGRAM TRACING: CPT | Performed by: ANESTHESIOLOGY

## 2020-06-17 PROCEDURE — 25010000002 HEPARIN (PORCINE) PER 1000 UNITS: Performed by: THORACIC SURGERY (CARDIOTHORACIC VASCULAR SURGERY)

## 2020-06-17 PROCEDURE — 25010000002 FENTANYL CITRATE (PF) 100 MCG/2ML SOLUTION: Performed by: NURSE ANESTHETIST, CERTIFIED REGISTERED

## 2020-06-17 PROCEDURE — 25010000002 ONDANSETRON PER 1 MG: Performed by: NURSE ANESTHETIST, CERTIFIED REGISTERED

## 2020-06-17 PROCEDURE — 25010000002 ROPIVACAINE PER 1 MG: Performed by: NURSE ANESTHETIST, CERTIFIED REGISTERED

## 2020-06-17 PROCEDURE — 25010000002 DEXAMETHASONE PER 1 MG: Performed by: NURSE ANESTHETIST, CERTIFIED REGISTERED

## 2020-06-17 PROCEDURE — 80048 BASIC METABOLIC PNL TOTAL CA: CPT | Performed by: ANESTHESIOLOGY

## 2020-06-17 RX ORDER — ONDANSETRON 2 MG/ML
4 INJECTION INTRAMUSCULAR; INTRAVENOUS ONCE AS NEEDED
Status: DISCONTINUED | OUTPATIENT
Start: 2020-06-17 | End: 2020-06-17 | Stop reason: HOSPADM

## 2020-06-17 RX ORDER — PROPOFOL 10 MG/ML
VIAL (ML) INTRAVENOUS AS NEEDED
Status: DISCONTINUED | OUTPATIENT
Start: 2020-06-17 | End: 2020-06-17 | Stop reason: SURG

## 2020-06-17 RX ORDER — HEPARIN SODIUM 5000 [USP'U]/ML
INJECTION, SOLUTION INTRAVENOUS; SUBCUTANEOUS AS NEEDED
Status: DISCONTINUED | OUTPATIENT
Start: 2020-06-17 | End: 2020-06-17 | Stop reason: HOSPADM

## 2020-06-17 RX ORDER — ROPIVACAINE HYDROCHLORIDE 5 MG/ML
INJECTION, SOLUTION EPIDURAL; INFILTRATION; PERINEURAL
Status: COMPLETED | OUTPATIENT
Start: 2020-06-17 | End: 2020-06-17

## 2020-06-17 RX ORDER — ONDANSETRON 4 MG/1
4 TABLET, FILM COATED ORAL ONCE AS NEEDED
Status: DISCONTINUED | OUTPATIENT
Start: 2020-06-17 | End: 2020-06-17 | Stop reason: HOSPADM

## 2020-06-17 RX ORDER — SODIUM CHLORIDE 9 MG/ML
50 INJECTION, SOLUTION INTRAVENOUS CONTINUOUS
Status: DISCONTINUED | OUTPATIENT
Start: 2020-06-17 | End: 2020-06-17 | Stop reason: HOSPADM

## 2020-06-17 RX ORDER — ACETAMINOPHEN 325 MG/1
650 TABLET ORAL ONCE
Status: DISCONTINUED | OUTPATIENT
Start: 2020-06-17 | End: 2020-06-17 | Stop reason: HOSPADM

## 2020-06-17 RX ORDER — SODIUM CHLORIDE 0.9 % (FLUSH) 0.9 %
3 SYRINGE (ML) INJECTION EVERY 12 HOURS SCHEDULED
Status: DISCONTINUED | OUTPATIENT
Start: 2020-06-17 | End: 2020-06-17 | Stop reason: HOSPADM

## 2020-06-17 RX ORDER — HYDROCODONE BITARTRATE AND ACETAMINOPHEN 5; 325 MG/1; MG/1
1 TABLET ORAL ONCE AS NEEDED
Status: DISCONTINUED | OUTPATIENT
Start: 2020-06-17 | End: 2020-06-17 | Stop reason: HOSPADM

## 2020-06-17 RX ORDER — FENTANYL CITRATE 50 UG/ML
INJECTION, SOLUTION INTRAMUSCULAR; INTRAVENOUS AS NEEDED
Status: DISCONTINUED | OUTPATIENT
Start: 2020-06-17 | End: 2020-06-17 | Stop reason: SURG

## 2020-06-17 RX ORDER — BUPIVACAINE HCL/0.9 % NACL/PF 0.1 %
2 PLASTIC BAG, INJECTION (ML) EPIDURAL ONCE
Status: COMPLETED | OUTPATIENT
Start: 2020-06-17 | End: 2020-06-17

## 2020-06-17 RX ORDER — DEXAMETHASONE SODIUM PHOSPHATE 4 MG/ML
INJECTION, SOLUTION INTRA-ARTICULAR; INTRALESIONAL; INTRAMUSCULAR; INTRAVENOUS; SOFT TISSUE AS NEEDED
Status: DISCONTINUED | OUTPATIENT
Start: 2020-06-17 | End: 2020-06-17 | Stop reason: SURG

## 2020-06-17 RX ORDER — SODIUM CHLORIDE 0.9 % (FLUSH) 0.9 %
10 SYRINGE (ML) INJECTION AS NEEDED
Status: DISCONTINUED | OUTPATIENT
Start: 2020-06-17 | End: 2020-06-17 | Stop reason: HOSPADM

## 2020-06-17 RX ORDER — 0.9 % SODIUM CHLORIDE 0.9 %
VIAL (ML) INJECTION AS NEEDED
Status: DISCONTINUED | OUTPATIENT
Start: 2020-06-17 | End: 2020-06-17 | Stop reason: HOSPADM

## 2020-06-17 RX ORDER — ONDANSETRON 2 MG/ML
INJECTION INTRAMUSCULAR; INTRAVENOUS AS NEEDED
Status: DISCONTINUED | OUTPATIENT
Start: 2020-06-17 | End: 2020-06-17 | Stop reason: SURG

## 2020-06-17 RX ORDER — HYDROCODONE BITARTRATE AND ACETAMINOPHEN 5; 325 MG/1; MG/1
1 TABLET ORAL EVERY 4 HOURS PRN
Qty: 30 TABLET | Refills: 0 | Status: SHIPPED | OUTPATIENT
Start: 2020-06-17 | End: 2021-01-01

## 2020-06-17 RX ADMIN — PHENYLEPHRINE HYDROCHLORIDE 100 MCG: 10 INJECTION INTRAVENOUS at 08:27

## 2020-06-17 RX ADMIN — DEXAMETHASONE SODIUM PHOSPHATE 4 MG: 4 INJECTION, SOLUTION INTRAMUSCULAR; INTRAVENOUS at 07:21

## 2020-06-17 RX ADMIN — PHENYLEPHRINE HYDROCHLORIDE 100 MCG: 10 INJECTION INTRAVENOUS at 08:34

## 2020-06-17 RX ADMIN — Medication 2 G: at 07:29

## 2020-06-17 RX ADMIN — ROPIVACAINE HYDROCHLORIDE 25 ML: 5 INJECTION, SOLUTION EPIDURAL; INFILTRATION; PERINEURAL at 06:57

## 2020-06-17 RX ADMIN — ONDANSETRON 4 MG: 2 INJECTION INTRAMUSCULAR; INTRAVENOUS at 08:56

## 2020-06-17 RX ADMIN — FENTANYL CITRATE 25 MCG: 50 INJECTION, SOLUTION INTRAMUSCULAR; INTRAVENOUS at 08:48

## 2020-06-17 RX ADMIN — SODIUM CHLORIDE 50 ML/HR: 9 INJECTION, SOLUTION INTRAVENOUS at 05:53

## 2020-06-17 RX ADMIN — PHENYLEPHRINE HYDROCHLORIDE 100 MCG: 10 INJECTION INTRAVENOUS at 08:02

## 2020-06-17 RX ADMIN — PROPOFOL 150 MG: 10 INJECTION, EMULSION INTRAVENOUS at 07:21

## 2020-06-17 RX ADMIN — FENTANYL CITRATE 25 MCG: 50 INJECTION, SOLUTION INTRAMUSCULAR; INTRAVENOUS at 07:24

## 2020-06-17 RX ADMIN — FENTANYL CITRATE 25 MCG: 50 INJECTION, SOLUTION INTRAMUSCULAR; INTRAVENOUS at 07:36

## 2020-06-17 RX ADMIN — FENTANYL CITRATE 25 MCG: 50 INJECTION, SOLUTION INTRAMUSCULAR; INTRAVENOUS at 08:13

## 2020-06-17 RX ADMIN — PHENYLEPHRINE HYDROCHLORIDE 100 MCG: 10 INJECTION INTRAVENOUS at 07:43

## 2020-06-17 NOTE — ANESTHESIA PROCEDURE NOTES
Peripheral Block      Patient reassessed immediately prior to procedure    Patient location during procedure: pre-op  Start time: 6/17/2020 6:49 AM  Stop time: 6/17/2020 6:57 AM  Reason for block: post-op pain management  Performed by  Anesthesiologist: Slava Sheth MD  CRNA: Minh Gonzalez CRNA  Preanesthetic Checklist  Completed: patient identified, site marked, surgical consent, pre-op evaluation, timeout performed, IV checked, risks and benefits discussed and monitors and equipment checked  Prep:  Pt Position: supine  Sterile barriers:cap, gloves, sterile barriers and mask  Prep: ChloraPrep  Patient monitoring: blood pressure monitoring, continuous pulse oximetry and EKG  Procedure  Sedation:no  Performed under: local infiltration  Guidance:ultrasound guided and landmark technique  ULTRASOUND INTERPRETATION.  Using ultrasound guidance a 22 G gauge needle was placed in close proximity to the brachial plexus nerve, at which point, under ultrasound guidance anesthetic was injected in the area of the nerve and spread of the anesthesia was seen on ultrasound in close proximity thereto.  There were no abnormalities seen on ultrasound; a digital image was taken; and the patient tolerated the procedure with no complications. Images:still images obtained, printed/placed on chart    Laterality:left  Block Type:supraclavicular  Injection Technique:single-shot  Needle Type:echogenic  Needle Gauge:22 G  Resistance on Injection: none    Medications Used: ropivacaine (NAROPIN) 0.5 % injection, 25 mL  Med admintered at 6/17/2020 6:57 AM      Post Assessment  Injection Assessment: negative aspiration for heme, no paresthesia on injection and incremental injection  Patient Tolerance:comfortable throughout block  Complications:no

## 2020-06-17 NOTE — ANESTHESIA PREPROCEDURE EVALUATION
Anesthesia Evaluation     Patient summary reviewed and Nursing notes reviewed   no history of anesthetic complications:  NPO Solid Status: > 8 hours  NPO Liquid Status: > 8 hours           Airway   Mallampati: III  TM distance: <3 FB  Neck ROM: full  Large neck circumference, Anterior and Difficult intubation highly probable  Dental    (+) poor dentation    Comment: Remaining upper and lower dentition in poor repair.    Pulmonary - negative pulmonary ROS    breath sounds clear to auscultation  (+) decreased breath sounds,   (-) not a smoker    ROS comment: FINDINGS:     Life-support devices: There is a Central Venous access Line  terminating in the Superior Vena Cava     Lungs/pleura: Clear, no pneumothorax.     Heart, hilar and mediastinal structures:  Normal accounting for  projection and technique     IMPRESSION:  CONCLUSION:  No pneumothorax   There is a Central Venous access Line terminating in the Inferior  Superior Vena Cava     Electronically signed by:  Aubrey Shirley MD  8/23/2017 11:43 AM  Cardiovascular - normal exam    ECG reviewed  Patient on routine beta blocker and Beta blocker given within 24 hours of surgery  Rhythm: regular  Rate: normal    (+) hypertension, hyperlipidemia,  carotid artery disease carotid bilateral  (-) murmur    ROS comment: Normal sinus rhythm  Incomplete right bundle branch block  Borderline ECG  When compared with ECG of 23-AUG-2017 18:55,  No significant change was found     Referred By:             Confirmed By:     Specimen Collected: 06/17/20 05:05          Neuro/Psych  (+) psychiatric history Depression,     GI/Hepatic/Renal/Endo    (+) obesity,  GERD well controlled,  renal disease CRI and dialysis, thyroid problem hypothyroidism    Musculoskeletal     Abdominal   (+) obese,    Substance History - negative use     OB/GYN negative ob/gyn ROS         Other   arthritis (Gouty.),    history of cancer (Colon ) remission                    Anesthesia Plan    ASA 4     general    (Discussed peripheral nerve block(supraclavicular)for post op pain relief and patient,wife understand possible complications,risks and agrees.)  intravenous induction     Anesthetic plan, all risks, benefits, and alternatives have been provided, discussed and informed consent has been obtained with: patient and spouse/significant other.

## 2020-06-17 NOTE — PROGRESS NOTES
Detailed discussion with Jesús Austin regarding situation and options.  ESRD on hemodialysis.  Long term AV access for dialysis is advisable.    Multiple risk factors with severe comorbidities.  Ligation collateral branches advised    Risks including but not limited to infection, bleeding, blood vessel and nerve injury, swelling, reduced circulation to distal extremity, need for revision and repeat intervention to maintain access.  Benefits:  avoidance of catheter, long term access for dialysis.  Options:  catheter, fistula vs graft, peritoneal dialysis, renal transplantation.  Understands and wishes to proceed.    LEFT revision radial artery to cephalic vein AV fistula branch ligation vascular ultrasound.  Regional/GEN.  SDS.  6/17/2020        This document has been electronically signed by Michael Shields MD on June 17, 2020 07:06

## 2020-06-17 NOTE — ANESTHESIA PROCEDURE NOTES
Airway  Urgency: elective    Date/Time: 6/17/2020 7:21 AM    General Information and Staff    Patient location during procedure: OR  CRNA: Minh Gonzalez CRNA    Indications and Patient Condition  Indications for airway management: airway protection    Preoxygenated: yes  Mask difficulty assessment: 1 - vent by mask    Final Airway Details  Final airway type: supraglottic airway      Successful airway: I-gel  Size 4    Number of attempts at approach: 1  Assessment: lips, teeth, and gum same as pre-op and atraumatic intubation

## 2020-06-17 NOTE — ANESTHESIA POSTPROCEDURE EVALUATION
Patient: Jesús Austin    Procedure Summary     Date:  06/17/20 Room / Location:  [facility] OR  / [facility] OR    Anesthesia Start:  0715 Anesthesia Stop:  0924    Procedure:  revision LEFT ARTERIOVENOUS FISTULA AND SHUNT branch ligation (Left ) Diagnosis:       ESRD (end stage renal disease) on dialysis (CMS/[facility])      (ESRD (end stage renal disease) on dialysis (CMS/[facility]) [N18.6, Z99.2])    Surgeon:  Michael Shields MD Provider:  Slava Sheth MD    Anesthesia Type:  general ASA Status:  4          Anesthesia Type: general    Vitals  Vitals Value Taken Time   /81 6/17/2020  9:13 AM   Temp 96.7 °F (35.9 °C) 6/17/2020  9:13 AM   Pulse 79 6/17/2020  9:13 AM   Resp 14 6/17/2020  9:13 AM   SpO2 95 % 6/17/2020  9:13 AM           Post Anesthesia Care and Evaluation    Patient location during evaluation: PACU  Patient participation: complete - patient participated  Level of consciousness: sleepy but conscious and awake  Pain score: 0  Pain management: adequate  Airway patency: patent  Anesthetic complications: No anesthetic complications  PONV Status: none  Cardiovascular status: acceptable  Respiratory status: acceptable and spontaneous ventilation  Hydration status: acceptable

## 2020-06-17 NOTE — OP NOTE
OPERATIVE NOTE  Jesús Austin  1950 6/17/2020    PREOP DIAGNOSES:  ESRD (end stage renal disease) on dialysis (CMS/[facility]) [N18.6, Z99.2]    POSTOP DIAGNOSES:  ESRD (end stage renal disease) on dialysis (CMS/[facility]) [N18.6, Z99.2]    PROCEDURE  revision LEFT ARTERIOVENOUS FISTULA AND SHUNT branch ligation  Venous duplex unilateral extremity limited    SURGEON: ZOFIA Shields MD FACS RPVI    ASSISTANT: Greer Lawrence CFA    ANESTHESIA: General ET and Regional block     ESTIMATED BLOOD LOSS: minimal    COMPLICATIONS: None    SPECIMEN:  None    DESCRIPTION OF OPERATION:   Patient taken to the operating room placed in supine position, anesthesia was induced.  Prepped draped in sterile fashion. vascular ultrasound was used to identify the cephalic vein in the forearm which was 6mm in diameter and patent. Distal and mid forearm branches were identified and marked.  Incision was made LEFT in the distal and mid forearm, dissection carried down to the cephalic vein branches, which was isolated ligated with 3-0 vicryl tie and clips.  Excellent thrill in the fistula at the completion of the case.  Good radial pulse.  Hemostasis was obtained. Incision closed 4 Monocryl in the skin with Dermabond.  Patient tolerated procedure well, transferred to PACU in stable condition.              This document has been electronically signed by Michael Shields MD on June 17, 2020 09:27

## 2020-07-01 ENCOUNTER — OFFICE VISIT (OUTPATIENT)
Dept: CARDIAC SURGERY | Facility: CLINIC | Age: 70
End: 2020-07-01

## 2020-07-01 VITALS
WEIGHT: 242 LBS | DIASTOLIC BLOOD PRESSURE: 81 MMHG | HEART RATE: 77 BPM | SYSTOLIC BLOOD PRESSURE: 130 MMHG | HEIGHT: 71 IN | OXYGEN SATURATION: 96 % | BODY MASS INDEX: 33.88 KG/M2

## 2020-07-01 DIAGNOSIS — N18.6 ESRD (END STAGE RENAL DISEASE) ON DIALYSIS (HCC): ICD-10-CM

## 2020-07-01 DIAGNOSIS — I10 ESSENTIAL HYPERTENSION: ICD-10-CM

## 2020-07-01 DIAGNOSIS — Z99.2 ESRD (END STAGE RENAL DISEASE) ON DIALYSIS (HCC): ICD-10-CM

## 2020-07-01 DIAGNOSIS — Z09 FOLLOW-UP SURGERY CARE: ICD-10-CM

## 2020-07-01 DIAGNOSIS — E66.9 OBESITY (BMI 30-39.9): ICD-10-CM

## 2020-07-01 DIAGNOSIS — I77.0 A-V FISTULA (HCC): Primary | ICD-10-CM

## 2020-07-01 PROCEDURE — 99024 POSTOP FOLLOW-UP VISIT: CPT | Performed by: NURSE PRACTITIONER

## 2020-07-01 NOTE — PATIENT INSTRUCTIONS
Patent LEFT Fistula  Increased velocities mid-fistula  No current problems with dialysis  Continue dialysis as scheduled. If problems should arise including difficulty with access, high pressure alarms, or inability to complete dialysis please notify [facility] immediately for evaluation.   Return 6 weeks- repeat duplex (office will call with appointment)

## 2020-07-02 NOTE — PROGRESS NOTES
CVTS Office Progress Note       Subjective   Patient ID: Jesús Austin is a 69 y.o. male is here today for follow-up.    Chief Complaint:    Chief Complaint   Patient presents with   • Follow-up AV Fistula     2 wk       The following portions of the patient's history were reviewed and updated as appropriate: allergies, current medications, past family history, past medical history, past social history, past surgical history and problem list.  Recent images independently reviewed.  Available laboratory values reviewed.    PCP:  Luis Miguel Orourke MD  Nephrology: [facility]    69 y.o. male with HTN(uncontrolled, increased risk stroke), AAA(stable, increased risk rupture) EndoRepair [facility] (2016), ESRD(stable, hemodialysis, increased risk cardiovascular events), Carotid Stenosis(stable, increased risk stroke).  never smoked.  Started dialysis 8/2017 via tunnel cath.  Immature radial AVF.  Now functional, no problems at dialysis. .  No other associated signs, symptoms or modifying factors.     7/5/2017 LEFT radial artery to cephalic vein forearm AV fistula([place])  8/2017 placement tunneled dialysis catheter   8/2017 ECG:  NSR 70, QTc 451, RBBB   6/5/2020: LEFT Fistulogram   6/17/2020: LEFT AV Fistula Revision Branch Ligation   7/1/2020: LEFTFistula Duplex: mPSV 685cm/s.  Size 3.5 -14mm. Flows 466 -2686ml/m      Past Medical History:   Diagnosis Date   • Abdominal aortic aneurysm (CMS/HCC)    • Allergic rhinitis    • Arthritis    • Carotid artery stenosis    • Degenerative joint disease involving multiple joints    • Disease of thyroid gland    • Diverticulitis    • Dyslipidemia    • Eustachian tube disorder    • History of renal dialysis    • Hypertension    • Ingrown toenail    • Kidney stone    • Malignant tumor of Meckel's diverticulum (CMS/HCC)    • Myopic astigmatism    • Rheumatic fever      Past Surgical History:   Procedure Laterality Date   • ABDOMINAL AORTIC ANEURYSM REPAIR  08/2016    2 repairs done at same  time in [place]   • ARTERIOVENOUS FISTULA Left 07/05/2017    fistula placed in left forearm   • ARTERIOVENOUS FISTULA/SHUNT SURGERY Left 6/17/2020    Procedure: revision LEFT ARTERIOVENOUS FISTULA AND SHUNT branch ligation;  Surgeon: Michael Shields MD;  Location: [facility] OR;  Service: Vascular;  Laterality: Left;   • INJECTION OF MEDICATION      Kenalog (4)    • INTERVENTIONAL RADIOLOGY PROCEDURE N/A 8/23/2017    Procedure: tunneled central venous catheter placement;  Surgeon: Michael Shields MD;  Location: [facility] ANGIO INVASIVE LOCATION;  Service:    • INTERVENTIONAL RADIOLOGY PROCEDURE Left 6/5/2020    Procedure: LEFT dialysis fistulagram possible angioplasty stent tunnel catheter;  Surgeon: Michael Shields MD;  Location: [facility] ANGIO INVASIVE LOCATION;  Service: Interventional Radiology;  Laterality: Left;   • TESTICLE TORSION REPAIR N/A 1964    Pt not sure which side     Family History   Problem Relation Age of Onset   • Heart failure Father    • Diabetes Other    • Hypertension Other      Social History     Tobacco Use   • Smoking status: Never Smoker   • Smokeless tobacco: Never Used   Substance Use Topics   • Alcohol use: No   • Drug use: No       ALLERGIES:   Dilaudid [hydromorphone hcl]; Morphine; and Tape    MEDICATIONS:      Current Outpatient Medications:   •  acetaminophen (TYLENOL) 650 MG 8 hr tablet, Take 650 mg by mouth Every 8 (Eight) Hours As Needed for Mild Pain ., Disp: , Rfl:   •  allopurinol (ZYLOPRIM) 100 MG tablet, Take 100 mg by mouth Daily., Disp: , Rfl:   •  amitriptyline (ELAVIL) 10 MG tablet, Take 10 mg by mouth Every Night., Disp: , Rfl:   •  aspirin 81 MG EC tablet, Take 81 mg by mouth Daily., Disp: , Rfl:   •  Calcium Acetate 667 MG tablet, Take 667 mg by mouth 3 (Three) Times a Day With Meals., Disp: , Rfl:   •  CO ENZYME Q-10 PO, Take 100 mg by mouth Daily., Disp: , Rfl:   •  cyclobenzaprine (FLEXERIL) 10 MG tablet, TAKE 1 TABLET BY MOUTH 3 TIMES DAILY AS  "NEEDED FOR MUSCLE SPASMS (Patient taking differently: 5 mg 3 (Three) Times a Day As Needed.), Disp: 30 tablet, Rfl: 0  •  DOCUSATE SODIUM PO, Take  by mouth 2 (Two) Times a Day., Disp: , Rfl:   •  fluticasone (FLONASE) 50 MCG/ACT nasal spray, 1 spray into the nostril(s) as directed by provider Daily As Needed., Disp: , Rfl:   •  HYDROcodone-acetaminophen (NORCO) 5-325 MG per tablet, Take 1 tablet by mouth Every 4 (Four) Hours As Needed (Pain)., Disp: 30 tablet, Rfl: 0  •  levothyroxine (SYNTHROID, LEVOTHROID) 50 MCG tablet, Take 50 mcg by mouth Daily., Disp: , Rfl:   •  Loratadine (Claritin) 10 MG capsule, Take 10 mg by mouth Daily As Needed., Disp: , Rfl:   •  metoprolol tartrate (LOPRESSOR) 6.25 MG quarter tablet, Take 25 mg by mouth Daily., Disp: , Rfl:   •  Omega-3 Fatty Acids (FISH OIL) 1000 MG capsule capsule, Take 1,200 mg by mouth Every Night., Disp: , Rfl:   •  omeprazole (priLOSEC) 20 MG capsule, Take 20 mg by mouth Daily., Disp: , Rfl:   •  oxyCODONE-acetaminophen (PERCOCET) 5-325 MG per tablet, Take 1 tablet by mouth Every 6 (Six) Hours As Needed for Moderate Pain ., Disp: 12 tablet, Rfl: 0  •  rosuvastatin (CRESTOR) 10 MG tablet, Take 10 mg by mouth Every Night., Disp: , Rfl:   •  sevelamer (RENVELA) 800 MG tablet, Take 800 mg by mouth 3 (Three) Times a Day With Meals., Disp: , Rfl:   •  vitamin B-12 (CYANOCOBALAMIN) 250 MCG tablet, Take 250 mcg by mouth Daily., Disp: , Rfl:     Review of Systems   Constitution: Positive for malaise/fatigue.   Hematologic/Lymphatic: Negative for bleeding problem.   Skin: Negative for color change and nail changes.   Musculoskeletal: Positive for arthritis.   Neurological: Negative for numbness and paresthesias.   All other systems reviewed and are negative.       Objective   Vitals:    07/01/20 0844   BP: 130/81   BP Location: Right arm   Patient Position: Sitting   Cuff Size: Adult   Pulse: 77   SpO2: 96%   Weight: 110 kg (242 lb)   Height: 180.3 cm (71\")     Body mass " index is 33.75 kg/m².  Physical Exam   Constitutional: He is oriented to person, place, and time. He appears well-nourished.   HENT:   Head: Atraumatic.   Eyes: EOM are normal.   Neck: Neck supple.   Cardiovascular: Normal rate.   Pulses:       Radial pulses are 2+ on the right side, and 2+ on the left side.        Dorsalis pedis pulses are 2+ on the right side, and 2+ on the left side.        Posterior tibial pulses are 2+ on the right side, and 2+ on the left side.   (L) Fistula : +thrill/bruit. Aneurysmal/thin mid-fistula   Pulmonary/Chest: Effort normal and breath sounds normal.   Abdominal: Soft. Bowel sounds are normal.   Musculoskeletal: Normal range of motion. He exhibits no edema.   Gait normal   Neurological: He is alert and oriented to person, place, and time.   Skin: Skin is warm and dry. Capillary refill takes less than 2 seconds.   Psychiatric: He has a normal mood and affect. Thought content normal.   Vitals reviewed.        Assessment & Plan     Independent Review of Radiographic Studies:  Detailed discussion regarding risks, benefits, and treatment plan. Images independently reviewed. Patient understands, agrees, and wishes to proceed with plan.     1. A-V fistula (CMS/HCC)  Patent LEFT Fistula  Increased velocities mid-fistula  No current problems with dialysis  Continue dialysis as scheduled. If problems should arise including difficulty with access, high pressure alarms, or inability to complete dialysis please [facility] immediately for evaluation.   Return 6 weeks- repeat duplex (office will call with appointment)  - Duplex Hemodialysis Access CAR; Future    2. ESRD (end stage renal disease) on dialysis (CMS/HCC)  [facility] T,Th,Sa    3. Essential hypertension  Controlled.    4. Obesity (BMI 30-39.9)  Your BMI is 33 and falls within the overweight range. BMI is strongly correlated with increased health risks. Review options for weight management, heart healthy diet, and  exercise programs.    5. Follow-up surgery care  Signs and symptoms of infection including drainage from operative site, redness, swelling, with associated fever and/or chills notify [facility] immediately for wound check. Clean operative site with antibacterial soap/water, pat dry. Keep open to air unless draining, then may apply dry dressing.  No ointments or creams unless prescribed by provider.               This document has been electronically signed by DELORIS Mcgovern on July 2, 2020 08:37

## 2020-08-10 PROBLEM — T82.858A ARTERIOVENOUS FISTULA STENOSIS (HCC): Status: ACTIVE | Noted: 2020-01-01

## 2020-08-10 NOTE — PATIENT INSTRUCTIONS
Patent LEFT Fistula  Increased velocities noted mid fistula  Proceed with Fistulagram as scheduled.  8/27/2020  Nothing to eat or drink after midnight  Arrive SDS   Mandatory COVID Screening SDS   Follow up as scheduled

## 2020-08-10 NOTE — PROGRESS NOTES
CVTS Office Progress Note       Subjective   Patient ID: Jesús Austin is a 69 y.o. male is here today for follow-up.    Chief Complaint   Patient presents with   • Hemodialysis Access     6 wk f/u       The following portions of the patient's history were reviewed and updated as appropriate: allergies, current medications, past family history, past medical history, past social history, past surgical history and problem list.  Recent images independently reviewed.  Available laboratory values reviewed.    PCP:  Luis Miguel Orourke MD  Nephrology: [place]    69 y.o. male with HTN(uncontrolled, increased risk stroke), AAA(stable, increased risk rupture) EndoRepair [facility] (2016), ESRD(stable, hemodialysis, increased risk cardiovascular events), Carotid Stenosis(stable, increased risk stroke).  never smoked.  Started dialysis 8/2017 via tunnel cath.  Immature radial AVF.  Now functional, no problems at dialysis. .  No other associated signs, symptoms or modifying factors.     7/5/2017 LEFT radial artery to cephalic vein forearm AV fistula([place])  8/2017 placement tunneled dialysis catheter   8/2017 ECG:  NSR 70, QTc 451, RBBB   6/5/2020: LEFT Fistulogram   6/17/2020: LEFT AV Fistula Revision Branch Ligation   7/1/2020: LEFTFistula Duplex: mPSV 685cm/s.  Size 3.5 -14mm. Flows 466 -2686ml/m   8/10/2020 LEFTFistula Duplex: mPSV 730cm/s.  Size 2.0 -14mm. Flows 698 -3830ml/m      Past Medical History:   Diagnosis Date   • Abdominal aortic aneurysm (CMS/HCC)    • Allergic rhinitis    • Arthritis    • Carotid artery stenosis    • Degenerative joint disease involving multiple joints    • Disease of thyroid gland    • Diverticulitis    • Dyslipidemia    • Eustachian tube disorder    • History of renal dialysis    • Hypertension    • Ingrown toenail    • Kidney stone    • Malignant tumor of Meckel's diverticulum (CMS/HCC)    • Myopic astigmatism    • Rheumatic fever      Past Surgical History:   Procedure Laterality Date   •  ABDOMINAL AORTIC ANEURYSM REPAIR  08/2016    2 repairs done at same time in [place]   • ARTERIOVENOUS FISTULA Left 07/05/2017    fistula placed in left forearm   • ARTERIOVENOUS FISTULA/SHUNT SURGERY Left 6/17/2020    Procedure: revision LEFT ARTERIOVENOUS FISTULA AND SHUNT branch ligation;  Surgeon: Michael Shields MD;  Location: [facility] OR;  Service: Vascular;  Laterality: Left;   • INJECTION OF MEDICATION      Kenalog (4)    • INTERVENTIONAL RADIOLOGY PROCEDURE N/A 8/23/2017    Procedure: tunneled central venous catheter placement;  Surgeon: Michael Shields MD;  Location: [facility] ANGIO INVASIVE LOCATION;  Service:    • INTERVENTIONAL RADIOLOGY PROCEDURE Left 6/5/2020    Procedure: LEFT dialysis fistulagram possible angioplasty stent tunnel catheter;  Surgeon: Michael Shields MD;  Location: [facility] ANGIO INVASIVE LOCATION;  Service: Interventional Radiology;  Laterality: Left;   • TESTICLE TORSION REPAIR N/A 1964    Pt not sure which side     Family History   Problem Relation Age of Onset   • Heart failure Father    • Diabetes Other    • Hypertension Other      Social History     Tobacco Use   • Smoking status: Never Smoker   • Smokeless tobacco: Never Used   Substance Use Topics   • Alcohol use: No   • Drug use: No       ALLERGIES:   Dilaudid [hydromorphone hcl]; Morphine; and Tape    MEDICATIONS:      Current Outpatient Medications:   •  acetaminophen (TYLENOL) 650 MG 8 hr tablet, Take 650 mg by mouth Every 8 (Eight) Hours As Needed for Mild Pain ., Disp: , Rfl:   •  allopurinol (ZYLOPRIM) 100 MG tablet, Take 100 mg by mouth Daily., Disp: , Rfl:   •  amitriptyline (ELAVIL) 10 MG tablet, Take 10 mg by mouth Every Night., Disp: , Rfl:   •  aspirin 81 MG EC tablet, Take 81 mg by mouth Daily., Disp: , Rfl:   •  Calcium Acetate 667 MG tablet, Take 667 mg by mouth 3 (Three) Times a Day With Meals., Disp: , Rfl:   •  CO ENZYME Q-10 PO, Take 100 mg by mouth Daily., Disp: , Rfl:   •  cyclobenzaprine  "(FLEXERIL) 10 MG tablet, TAKE 1 TABLET BY MOUTH 3 TIMES DAILY AS NEEDED FOR MUSCLE SPASMS (Patient taking differently: 5 mg 3 (Three) Times a Day As Needed.), Disp: 30 tablet, Rfl: 0  •  DOCUSATE SODIUM PO, Take  by mouth 2 (Two) Times a Day., Disp: , Rfl:   •  fluticasone (FLONASE) 50 MCG/ACT nasal spray, 1 spray into the nostril(s) as directed by provider Daily As Needed., Disp: , Rfl:   •  levothyroxine (SYNTHROID, LEVOTHROID) 50 MCG tablet, Take 50 mcg by mouth Daily., Disp: , Rfl:   •  Loratadine (Claritin) 10 MG capsule, Take 10 mg by mouth Daily As Needed., Disp: , Rfl:   •  metoprolol tartrate (LOPRESSOR) 6.25 MG quarter tablet, Take 25 mg by mouth Daily., Disp: , Rfl:   •  Omega-3 Fatty Acids (FISH OIL) 1000 MG capsule capsule, Take 1,200 mg by mouth Every Night., Disp: , Rfl:   •  omeprazole (priLOSEC) 20 MG capsule, Take 20 mg by mouth Daily., Disp: , Rfl:   •  rosuvastatin (CRESTOR) 10 MG tablet, Take 10 mg by mouth Every Night., Disp: , Rfl:   •  sevelamer (RENVELA) 800 MG tablet, Take 800 mg by mouth 3 (Three) Times a Day With Meals., Disp: , Rfl:   •  vitamin B-12 (CYANOCOBALAMIN) 250 MCG tablet, Take 250 mcg by mouth Daily., Disp: , Rfl:   •  HYDROcodone-acetaminophen (NORCO) 5-325 MG per tablet, Take 1 tablet by mouth Every 4 (Four) Hours As Needed (Pain)., Disp: 30 tablet, Rfl: 0    Review of Systems   Constitution: Positive for malaise/fatigue.   Hematologic/Lymphatic: Negative for bleeding problem.   Skin: Negative for color change and nail changes.   Musculoskeletal: Positive for arthritis.   Neurological: Negative for numbness and paresthesias.   All other systems reviewed and are negative.       Objective   Vitals:    08/10/20 0932   BP: 142/84   BP Location: Right arm   Pulse: 83   SpO2: 98%   Weight: 112 kg (247 lb 9.6 oz)   Height: 180.3 cm (71\")     Body mass index is 34.53 kg/m².  Physical Exam   Constitutional: He is oriented to person, place, and time. He appears well-nourished.   HENT: "   Head: Atraumatic.   Eyes: EOM are normal.   Neck: Neck supple.   Cardiovascular: Normal rate.   Pulses:       Radial pulses are 2+ on the right side, and 2+ on the left side.        Dorsalis pedis pulses are 2+ on the right side, and 2+ on the left side.        Posterior tibial pulses are 2+ on the right side, and 2+ on the left side.   (L) Fistula : +thrill/bruit. Aneurysmal/thin mid-fistula   Pulmonary/Chest: Effort normal and breath sounds normal.   Abdominal: Soft. Bowel sounds are normal.   Musculoskeletal: Normal range of motion. He exhibits no edema.   Gait normal   Neurological: He is alert and oriented to person, place, and time.   Skin: Skin is warm and dry. Capillary refill takes less than 2 seconds.   Psychiatric: He has a normal mood and affect. Thought content normal.   Vitals reviewed.        Assessment & Plan     Independent Review of Radiographic Studies:  Detailed discussion regarding risks, benefits, and treatment plan. Images independently reviewed. Patient understands, agrees, and wishes to proceed with plan.     1. Arteriovenous fistula stenosis, initial encounter (CMS/[facility])  Patent LEFT Fistula  Increased velocities noted mid fistula  Proceed with Fistulagram as scheduled.  8/27/2020  Nothing to eat or drink after midnight  Arrive SDS   Mandatory COVID Screening SDS   Follow up as scheduled  - Follow Anesthesia Guidelines / Protocol; Standing  - Obtain Informed Consent; Standing  - Case Request; Standing  - Provide NPO Instructions to Patient; Future  - sodium chloride 0.9 % infusion  - ceFAZolin (ANCEF) 2 g in sodium chloride 0.9 % 100 mL IVPB  - Case Request    Detailed discussion with Jesús Austin regarding situation and options. Poorly maturing (LEFT) AV graft by duplex ultrasound and exam.  Increased velocity by duplex ultrasound indication significant stenosis and impending graft occlusion. Jesús Austin understands risks including but not limited to bleeding, infection, blood  vessel or nerve injury, inability to maintain patent graft, need for tunnel cath placement.  Benefits: maintenance of patent dialysis access.  Options: surgical vs endovascular evaluation and treatment.  Understands and wishes to proceed.  (LEFT) fistulogram, possible thrombolysis, angioplasty, stent, tunnel cath scheduled for 8/27/2020.    2. ESRD (end stage renal disease) on dialysis (CMS/[facility])  Continue dialysis as scheduled. If problems should arise including difficulty with access, high pressure alarms, or inability to complete dialysis please notify [facility] immediately for evaluation. T,Th,Sa    3. Essential hypertension  Controlled.    4. Obesity (BMI 30-39.9)  Your BMI is 34 and falls within the overweight range. BMI is strongly correlated with increased health risks. Review options for weight management, heart healthy diet, and exercise programs.    Time spent with patient 40 out of 40 min face to face evaluating, treating, and discussing findings regarding vascular exam and studies. Coordination of LEFT Fistulagram/PreOp/COVID screening and education to patient and family regarding treatment options, plan of care, and hoped for outcomes.           This document has been electronically signed by DELORIS Mcgovern on August 11, 2020 10:05

## 2020-11-09 NOTE — TELEPHONE ENCOUNTER
Spoke with Ms. Austin with information for CT scan. Date given for 11/20 at 9:00am NPO 4 hours prior to exam. Verbalized understanding and repeated all appointment information.      ----- Message from DELORIS Mcgovern sent at 11/9/2020  1:58 PM CST -----  He will need CT abd/pelvis with contrast to establish is EndoAAA with us. Can do prior to 11/30 appointment if they wish we can set up. Contrast is ok he will have dialysis.   E  ----- Message -----  From: Inés Valladares  Sent: 11/9/2020   9:12 AM CST  To: DELORIS Mcgovern    MsKrzysztof Norman called asking to talk to you this morning.  She was wanting to know if you could order an ultrasound of his aorta to have done when he comes and sees you on 11/30 to follow-up on his fistula?  She said his repair was done in [place], but they are wanting to switch everything to you and Dr. Shields.

## 2020-12-04 NOTE — PROGRESS NOTES
CVTS Office Progress Note       Subjective   Patient ID: Jesús Austin is a 70 y.o. male is here today for follow-up.    Chief Complaint   Patient presents with   • Aortic Aneurysm   • Hemodialysis Access       The following portions of the patient's history were reviewed and updated as appropriate: allergies, current medications, past family history, past medical history, past social history, past surgical history and problem list.  Recent images independently reviewed.  Available laboratory values reviewed.    PCP:  Luis Miguel Orourke MD  Nephrology: Desirae    70 y.o. male with HTN(uncontrolled, increased risk stroke), AAA(stable, increased risk rupture) EndoRepair [facility] (2016), ESRD(stable, hemodialysis, increased risk cardiovascular events), Carotid Stenosis(stable, increased risk stroke).  never smoked.  Started dialysis 8/2017 via tunnel cath.  Immature radial AVF.  Now functional, no problems at dialysis. .  No other associated signs, symptoms or modifying factors.     7/5/2017 LEFT radial artery to cephalic vein forearm AV fistula([place])  8/2017 placement tunneled dialysis catheter   8/2017 ECG:  NSR 70, QTc 451, RBBB   6/5/2020: LEFT Fistulogram   6/17/2020: LEFT AV Fistula Revision Branch Ligation   7/1/2020: LEFTFistula Duplex: mPSV 685cm/s.  Size 3.5 -14mm. Flows 466 -2686ml/m   8/10/2020 LEFTFistula Duplex: mPSV 730cm/s.  Size 2.0 -14mm. Flows 698 -3830ml/m   8/27/2020: LEFT AV Fistula PTA   12/4/2020 LEFTFistula Duplex: mPSV 599cm/s.  Size 2.6 -7.4mm. Flows 288 -1534ml/m    EndoRepair [facility] (2016)  11/20/2020: CT Abd/Pelvis Contrast: No endoleak. [id number]. (R) Iliac 27mm (L) iliac 21mm    Past Medical History:   Diagnosis Date   • Abdominal aortic aneurysm (CMS/HCC)    • Allergic rhinitis    • Arthritis    • Carotid artery stenosis    • Degenerative joint disease involving multiple joints    • Disease of thyroid gland    • Diverticulitis    • Dyslipidemia    • Eustachian tube disorder    • History of  renal dialysis    • Hypertension    • Ingrown toenail    • Kidney stone    • Malignant tumor of Meckel's diverticulum (CMS/HCC)    • Myopic astigmatism    • Rheumatic fever      Past Surgical History:   Procedure Laterality Date   • ABDOMINAL AORTIC ANEURYSM REPAIR  08/2016    2 repairs done at same time in [place]   • ARTERIOVENOUS FISTULA Left 07/05/2017    fistula placed in left forearm   • ARTERIOVENOUS FISTULA/SHUNT SURGERY Left 6/17/2020    Procedure: revision LEFT ARTERIOVENOUS FISTULA AND SHUNT branch ligation;  Surgeon: Michael Shields MD;  Location: [facility] OR;  Service: Vascular;  Laterality: Left;   • INJECTION OF MEDICATION      Kenalog (4)    • INTERVENTIONAL RADIOLOGY PROCEDURE N/A 8/23/2017    Procedure: tunneled central venous catheter placement;  Surgeon: Michael Shields MD;  Location: [facility];  Service:    • INTERVENTIONAL RADIOLOGY PROCEDURE Left 6/5/2020    Procedure: LEFT dialysis fistulagram possible angioplasty stent tunnel catheter;  Surgeon: Michael Shields MD;  Location: [facility];  Service: Interventional Radiology;  Laterality: Left;   • INTERVENTIONAL RADIOLOGY PROCEDURE Left 8/27/2020    Procedure: IR dialysis fistulagram;  Surgeon: Michael Shields MD;  Location: [facility];  Service: Interventional Radiology;  Laterality: Left;   • TESTICLE TORSION REPAIR N/A 1964    Pt not sure which side     Family History   Problem Relation Age of Onset   • Heart failure Father    • Diabetes Other    • Hypertension Other      Social History     Tobacco Use   • Smoking status: Never Smoker   • Smokeless tobacco: Never Used   Substance Use Topics   • Alcohol use: No   • Drug use: No       ALLERGIES:   Dilaudid [hydromorphone hcl], Morphine, and Tape    MEDICATIONS:      Current Outpatient Medications:   •  acetaminophen (TYLENOL) 650 MG 8 hr tablet, Take 650 mg by mouth Every 8 (Eight) Hours As Needed for  Mild Pain ., Disp: , Rfl:   •  allopurinol (ZYLOPRIM) 100 MG tablet, Take 100 mg by mouth Daily., Disp: , Rfl:   •  amitriptyline (ELAVIL) 10 MG tablet, Take 10 mg by mouth Every Night., Disp: , Rfl:   •  aspirin 81 MG EC tablet, Take 81 mg by mouth Daily., Disp: , Rfl:   •  Calcium Acetate 667 MG tablet, Take 667 mg by mouth 3 (Three) Times a Day With Meals., Disp: , Rfl:   •  CO ENZYME Q-10 PO, Take 100 mg by mouth Daily., Disp: , Rfl:   •  cyclobenzaprine (FLEXERIL) 10 MG tablet, TAKE 1 TABLET BY MOUTH 3 TIMES DAILY AS NEEDED FOR MUSCLE SPASMS (Patient taking differently: 5 mg 3 (Three) Times a Day As Needed.), Disp: 30 tablet, Rfl: 0  •  DOCUSATE SODIUM PO, Take  by mouth 2 (Two) Times a Day., Disp: , Rfl:   •  fluticasone (FLONASE) 50 MCG/ACT nasal spray, 1 spray into the nostril(s) as directed by provider Daily As Needed., Disp: , Rfl:   •  levothyroxine (SYNTHROID, LEVOTHROID) 50 MCG tablet, Take 50 mcg by mouth Daily., Disp: , Rfl:   •  Loratadine (Claritin) 10 MG capsule, Take 10 mg by mouth Daily As Needed., Disp: , Rfl:   •  metoprolol tartrate (LOPRESSOR) 6.25 MG quarter tablet, Take 25 mg by mouth Daily., Disp: , Rfl:   •  Omega-3 Fatty Acids (FISH OIL) 1000 MG capsule capsule, Take 1,200 mg by mouth Every Night., Disp: , Rfl:   •  omeprazole (priLOSEC) 20 MG capsule, Take 20 mg by mouth Daily., Disp: , Rfl:   •  rosuvastatin (CRESTOR) 10 MG tablet, Take 10 mg by mouth Every Night., Disp: , Rfl:   •  sevelamer (RENVELA) 800 MG tablet, Take 800 mg by mouth 3 (Three) Times a Day With Meals., Disp: , Rfl:   •  vitamin B-12 (CYANOCOBALAMIN) 250 MCG tablet, Take 250 mcg by mouth Daily., Disp: , Rfl:   •  HYDROcodone-acetaminophen (NORCO) 5-325 MG per tablet, Take 1 tablet by mouth Every 4 (Four) Hours As Needed (Pain)., Disp: 30 tablet, Rfl: 0    Review of Systems   Constitution: Positive for malaise/fatigue.   Hematologic/Lymphatic: Negative for bleeding problem.   Skin: Negative for color change and nail  "changes.   Musculoskeletal: Positive for arthritis.   Neurological: Negative for numbness and paresthesias.   All other systems reviewed and are negative.       Objective   Vitals:    12/04/20 1328   BP: 139/81   BP Location: Right arm   Patient Position: Sitting   Pulse: 91   Temp: 98.4 °F (36.9 °C)   TempSrc: Infrared   SpO2: 98%   Weight: 113 kg (250 lb)   Height: 180.3 cm (71\")     Body mass index is 34.87 kg/m².  Physical Exam   Constitutional: He is oriented to person, place, and time.   HENT:   Head: Atraumatic.   Neck: Neck supple.   Cardiovascular: Normal rate.   Pulses:       Radial pulses are 2+ on the right side and 2+ on the left side.        Dorsalis pedis pulses are 2+ on the right side and 2+ on the left side.        Posterior tibial pulses are 2+ on the right side and 2+ on the left side.   (L) Fistula : +thrill/bruit. Aneurysmal/thin mid-fistula   Pulmonary/Chest: Effort normal and breath sounds normal.   Abdominal: Soft. Bowel sounds are normal.   Musculoskeletal: Normal range of motion.      Comments: Gait normal   Neurological: He is alert and oriented to person, place, and time.   Skin: Skin is warm and dry. Capillary refill takes less than 2 seconds.   Psychiatric: Thought content normal.   Vitals reviewed.        Assessment & Plan     Independent Review of Radiographic Studies:  Detailed discussion regarding risks, benefits, and treatment plan. Images independently reviewed. Patient understands, agrees, and wishes to proceed with plan.     1. A-V fistula (CMS/HCC)  Patent LEFT Fistula  Continue dialysis as scheduled. If problems should arise including difficulty with access, high pressure alarms, or inability to complete dialysis please [facility] immediately for evaluation.   Return 6 mos  - Duplex Hemodialysis Access CAR; Future    2. ESRD (end stage renal disease) on dialysis (CMS/HCC)      3. Abdominal aortic aneurysm (AAA) without rupture (CMS/HCC)  Stable Endo " AAA  No endoleak noted  If symptoms of uncontrolled abdominal pain or sudden onset of abdominal, back pain occur present to the nearest Emergency Department for evaluation.  U/S Aorta 1 year    4. Essential hypertension  stable            This document has been electronically signed by DELORIS Mcgovern on December 11, 2020 13:49 CST

## 2020-12-04 NOTE — PATIENT INSTRUCTIONS
Patent LEFT Fistula  Continue dialysis as scheduled. If problems should arise including difficulty with access, high pressure alarms, or inability to complete dialysis please [facility] immediately for evaluation.   Return 6 mos    Stable Endo AAA  No endoleak noted  If symptoms of uncontrolled abdominal pain or sudden onset of abdominal, back pain occur present to the nearest Emergency Department for evaluation.  U/S Aorta 1 year

## 2021-01-01 ENCOUNTER — HOSPITAL ENCOUNTER (OUTPATIENT)
Dept: CT IMAGING | Facility: HOSPITAL | Age: 71
Discharge: HOME | End: 2021-06-21
Admitting: INTERNAL MEDICINE

## 2021-01-01 ENCOUNTER — DOCUMENTATION (OUTPATIENT)
Dept: ONCOLOGY | Facility: CLINIC | Age: 71
End: 2021-01-01

## 2021-01-01 ENCOUNTER — ANESTHESIA EVENT (OUTPATIENT)
Dept: GASTROENTEROLOGY | Facility: HOSPITAL | Age: 71
End: 2021-01-01

## 2021-01-01 ENCOUNTER — APPOINTMENT (OUTPATIENT)
Dept: CT IMAGING | Facility: HOSPITAL | Age: 71
End: 2021-01-01

## 2021-01-01 ENCOUNTER — LAB (OUTPATIENT)
Dept: ONCOLOGY | Facility: HOSPITAL | Age: 71
End: 2021-01-01

## 2021-01-01 ENCOUNTER — IMMUNIZATION (OUTPATIENT)
Dept: VACCINE CLINIC | Facility: HOSPITAL | Age: 71
End: 2021-01-01

## 2021-01-01 ENCOUNTER — HOME CARE VISIT (OUTPATIENT)
Dept: HOME HEALTH SERVICES | Facility: CLINIC | Age: 71
End: 2021-01-01

## 2021-01-01 ENCOUNTER — APPOINTMENT (OUTPATIENT)
Dept: ONCOLOGY | Facility: CLINIC | Age: 71
End: 2021-01-01

## 2021-01-01 ENCOUNTER — HOSPITAL ENCOUNTER (OUTPATIENT)
Facility: HOSPITAL | Age: 71
Setting detail: HOSPITAL OUTPATIENT SURGERY
Discharge: HOME | End: 2021-06-28
Attending: INTERNAL MEDICINE | Admitting: INTERNAL MEDICINE

## 2021-01-01 ENCOUNTER — ANESTHESIA (OUTPATIENT)
Dept: GASTROENTEROLOGY | Facility: HOSPITAL | Age: 71
End: 2021-01-01

## 2021-01-01 ENCOUNTER — HOSPITAL ENCOUNTER (INPATIENT)
Facility: HOSPITAL | Age: 71
LOS: 1 days | Discharge: HOME | End: 2021-06-22
Attending: FAMILY MEDICINE | Admitting: INTERNAL MEDICINE

## 2021-01-01 ENCOUNTER — OFFICE VISIT (OUTPATIENT)
Dept: CARDIAC SURGERY | Facility: CLINIC | Age: 71
End: 2021-01-01

## 2021-01-01 ENCOUNTER — HOSPICE ADMISSION (OUTPATIENT)
Dept: HOSPICE | Facility: HOSPICE | Age: 71
End: 2021-01-01

## 2021-01-01 ENCOUNTER — LAB (OUTPATIENT)
Dept: LAB | Facility: HOSPITAL | Age: 71
End: 2021-01-01

## 2021-01-01 ENCOUNTER — CONSULT (OUTPATIENT)
Dept: ONCOLOGY | Facility: CLINIC | Age: 71
End: 2021-01-01

## 2021-01-01 ENCOUNTER — APPOINTMENT (OUTPATIENT)
Dept: ONCOLOGY | Facility: HOSPITAL | Age: 71
End: 2021-01-01

## 2021-01-01 ENCOUNTER — TELEPHONE (OUTPATIENT)
Dept: ONCOLOGY | Facility: HOSPITAL | Age: 71
End: 2021-01-01

## 2021-01-01 ENCOUNTER — HOSPITAL ENCOUNTER (OUTPATIENT)
Dept: GENERAL RADIOLOGY | Facility: HOSPITAL | Age: 71
Discharge: HOME | End: 2021-07-09
Admitting: INTERNAL MEDICINE

## 2021-01-01 ENCOUNTER — APPOINTMENT (OUTPATIENT)
Dept: GENERAL RADIOLOGY | Facility: HOSPITAL | Age: 71
End: 2021-01-01

## 2021-01-01 ENCOUNTER — HOSPITAL ENCOUNTER (INPATIENT)
Facility: HOSPITAL | Age: 71
LOS: 5 days | Discharge: HOSPICE | End: 2021-07-21
Attending: EMERGENCY MEDICINE | Admitting: INTERNAL MEDICINE

## 2021-01-01 ENCOUNTER — DOCUMENTATION (OUTPATIENT)
Dept: ONCOLOGY | Facility: HOSPITAL | Age: 71
End: 2021-01-01

## 2021-01-01 ENCOUNTER — HOSPITAL ENCOUNTER (INPATIENT)
Facility: HOSPITAL | Age: 71
LOS: 1 days | End: 2021-07-21
Attending: INTERNAL MEDICINE | Admitting: INTERNAL MEDICINE

## 2021-01-01 ENCOUNTER — OFFICE VISIT (OUTPATIENT)
Dept: ONCOLOGY | Facility: CLINIC | Age: 71
End: 2021-01-01

## 2021-01-01 ENCOUNTER — TRANSCRIBE ORDERS (OUTPATIENT)
Dept: HOSPICE | Facility: HOSPICE | Age: 71
End: 2021-01-01

## 2021-01-01 ENCOUNTER — TELEPHONE (OUTPATIENT)
Dept: NUTRITION | Facility: HOSPITAL | Age: 71
End: 2021-01-01

## 2021-01-01 VITALS
TEMPERATURE: 97.7 F | HEIGHT: 71 IN | BODY MASS INDEX: 34.79 KG/M2 | WEIGHT: 248.5 LBS | SYSTOLIC BLOOD PRESSURE: 142 MMHG | DIASTOLIC BLOOD PRESSURE: 67 MMHG | RESPIRATION RATE: 20 BRPM | HEART RATE: 62 BPM

## 2021-01-01 VITALS
WEIGHT: 240.8 LBS | OXYGEN SATURATION: 93 % | TEMPERATURE: 98.4 F | RESPIRATION RATE: 20 BRPM | HEIGHT: 71 IN | HEART RATE: 86 BPM | BODY MASS INDEX: 33.71 KG/M2 | SYSTOLIC BLOOD PRESSURE: 156 MMHG | DIASTOLIC BLOOD PRESSURE: 76 MMHG

## 2021-01-01 VITALS
SYSTOLIC BLOOD PRESSURE: 170 MMHG | WEIGHT: 245.9 LBS | BODY MASS INDEX: 34.43 KG/M2 | HEART RATE: 95 BPM | TEMPERATURE: 97.8 F | DIASTOLIC BLOOD PRESSURE: 78 MMHG | HEIGHT: 71 IN | RESPIRATION RATE: 18 BRPM

## 2021-01-01 VITALS
OXYGEN SATURATION: 98 % | TEMPERATURE: 97.5 F | HEART RATE: 102 BPM | BODY MASS INDEX: 34.69 KG/M2 | SYSTOLIC BLOOD PRESSURE: 170 MMHG | HEIGHT: 71 IN | WEIGHT: 247.8 LBS | RESPIRATION RATE: 18 BRPM | DIASTOLIC BLOOD PRESSURE: 89 MMHG

## 2021-01-01 VITALS
HEART RATE: 110 BPM | HEIGHT: 71 IN | TEMPERATURE: 96.7 F | BODY MASS INDEX: 33.91 KG/M2 | DIASTOLIC BLOOD PRESSURE: 48 MMHG | WEIGHT: 242.25 LBS | RESPIRATION RATE: 10 BRPM | SYSTOLIC BLOOD PRESSURE: 93 MMHG | OXYGEN SATURATION: 33 %

## 2021-01-01 VITALS
DIASTOLIC BLOOD PRESSURE: 82 MMHG | TEMPERATURE: 98 F | RESPIRATION RATE: 20 BRPM | SYSTOLIC BLOOD PRESSURE: 152 MMHG | HEART RATE: 86 BPM | OXYGEN SATURATION: 96 %

## 2021-01-01 VITALS
BODY MASS INDEX: 35.56 KG/M2 | HEART RATE: 90 BPM | WEIGHT: 254 LBS | SYSTOLIC BLOOD PRESSURE: 177 MMHG | DIASTOLIC BLOOD PRESSURE: 82 MMHG | HEIGHT: 71 IN | OXYGEN SATURATION: 99 % | TEMPERATURE: 98.1 F

## 2021-01-01 VITALS
TEMPERATURE: 97.7 F | DIASTOLIC BLOOD PRESSURE: 66 MMHG | RESPIRATION RATE: 10 BRPM | HEART RATE: 119 BPM | SYSTOLIC BLOOD PRESSURE: 113 MMHG

## 2021-01-01 VITALS — RESPIRATION RATE: 7 BRPM

## 2021-01-01 DIAGNOSIS — D64.9 ANEMIA, UNSPECIFIED TYPE: ICD-10-CM

## 2021-01-01 DIAGNOSIS — Z74.09 IMPAIRED MOBILITY AND ADLS: ICD-10-CM

## 2021-01-01 DIAGNOSIS — Z74.09 IMPAIRED FUNCTIONAL MOBILITY, BALANCE, GAIT, AND ENDURANCE: ICD-10-CM

## 2021-01-01 DIAGNOSIS — N18.6 ANEMIA DUE TO CHRONIC KIDNEY DISEASE, ON CHRONIC DIALYSIS (HCC): Primary | ICD-10-CM

## 2021-01-01 DIAGNOSIS — Z78.9 IMPAIRED MOBILITY AND ADLS: ICD-10-CM

## 2021-01-01 DIAGNOSIS — D63.1 ANEMIA DUE TO CHRONIC KIDNEY DISEASE, ON CHRONIC DIALYSIS (HCC): Primary | ICD-10-CM

## 2021-01-01 DIAGNOSIS — Z99.2 ANEMIA DUE TO CHRONIC KIDNEY DISEASE, ON CHRONIC DIALYSIS (HCC): ICD-10-CM

## 2021-01-01 DIAGNOSIS — R93.7 ABNORMAL FINDINGS ON DIAGNOSTIC IMAGING OF OTHER PARTS OF MUSCULOSKELETAL SYSTEM: ICD-10-CM

## 2021-01-01 DIAGNOSIS — K92.2 GASTROINTESTINAL BLEEDING: ICD-10-CM

## 2021-01-01 DIAGNOSIS — Z99.2 ANEMIA DUE TO CHRONIC KIDNEY DISEASE, ON CHRONIC DIALYSIS (HCC): Primary | ICD-10-CM

## 2021-01-01 DIAGNOSIS — M89.9 MULTIFOCAL ABNORMALITY OF BONE: ICD-10-CM

## 2021-01-01 DIAGNOSIS — N18.6 ESRD (END STAGE RENAL DISEASE) ON DIALYSIS (HCC): ICD-10-CM

## 2021-01-01 DIAGNOSIS — N18.6 ANEMIA DUE TO CHRONIC KIDNEY DISEASE, ON CHRONIC DIALYSIS (HCC): ICD-10-CM

## 2021-01-01 DIAGNOSIS — M89.9 MULTIFOCAL ABNORMALITY OF BONE: Chronic | ICD-10-CM

## 2021-01-01 DIAGNOSIS — Z86.010 HISTORY OF COLONIC POLYPS: ICD-10-CM

## 2021-01-01 DIAGNOSIS — C90.00 METASTATIC MULTIPLE MYELOMA TO BONE (HCC): Primary | ICD-10-CM

## 2021-01-01 DIAGNOSIS — K92.2 GASTROINTESTINAL HEMORRHAGE, UNSPECIFIED GASTROINTESTINAL HEMORRHAGE TYPE: ICD-10-CM

## 2021-01-01 DIAGNOSIS — C90.00 MULTIPLE MYELOMA NOT HAVING ACHIEVED REMISSION (HCC): ICD-10-CM

## 2021-01-01 DIAGNOSIS — D63.1 ANEMIA DUE TO CHRONIC KIDNEY DISEASE, ON CHRONIC DIALYSIS (HCC): ICD-10-CM

## 2021-01-01 DIAGNOSIS — I10 ESSENTIAL HYPERTENSION: ICD-10-CM

## 2021-01-01 DIAGNOSIS — D64.9 ANEMIA, UNSPECIFIED TYPE: Primary | ICD-10-CM

## 2021-01-01 DIAGNOSIS — D50.8 OTHER IRON DEFICIENCY ANEMIA: Chronic | ICD-10-CM

## 2021-01-01 DIAGNOSIS — I77.0 A-V FISTULA (HCC): Primary | ICD-10-CM

## 2021-01-01 DIAGNOSIS — R76.8 ELEVATED SERUM IMMUNOGLOBULIN FREE LIGHT CHAIN LEVEL: ICD-10-CM

## 2021-01-01 DIAGNOSIS — Z99.2 ESRD (END STAGE RENAL DISEASE) ON DIALYSIS (HCC): ICD-10-CM

## 2021-01-01 DIAGNOSIS — I71.40 ABDOMINAL AORTIC ANEURYSM (AAA) WITHOUT RUPTURE (HCC): ICD-10-CM

## 2021-01-01 DIAGNOSIS — C80.0 CARCINOMATOSIS (HCC): Primary | ICD-10-CM

## 2021-01-01 DIAGNOSIS — Z01.818 PREOP TESTING: Primary | ICD-10-CM

## 2021-01-01 LAB
ABO GROUP BLD: NORMAL
ALBUMIN SERPL ELPH-MCNC: 3.6 G/DL (ref 2.9–4.4)
ALBUMIN SERPL-MCNC: 3.7 G/DL (ref 3.5–5.2)
ALBUMIN SERPL-MCNC: 4 G/DL (ref 3.5–5.2)
ALBUMIN SERPL-MCNC: 4.1 G/DL (ref 3.5–5.2)
ALBUMIN/GLOB SERPL: 0.8 G/DL
ALBUMIN/GLOB SERPL: 0.8 {RATIO} (ref 0.7–1.7)
ALP SERPL-CCNC: 67 U/L (ref 39–117)
ALP SERPL-CCNC: 74 U/L (ref 39–117)
ALP SERPL-CCNC: 85 U/L (ref 39–117)
ALPHA1 GLOB SERPL ELPH-MCNC: 0.4 G/DL (ref 0–0.4)
ALPHA2 GLOB SERPL ELPH-MCNC: 0.8 G/DL (ref 0.4–1)
ALT SERPL W P-5'-P-CCNC: 12 U/L (ref 1–41)
ALT SERPL W P-5'-P-CCNC: 7 U/L (ref 1–41)
ALT SERPL W P-5'-P-CCNC: 8 U/L (ref 1–41)
ANION GAP SERPL CALCULATED.3IONS-SCNC: 10 MMOL/L (ref 5–15)
ANION GAP SERPL CALCULATED.3IONS-SCNC: 10 MMOL/L (ref 5–15)
ANION GAP SERPL CALCULATED.3IONS-SCNC: 11 MMOL/L (ref 5–15)
ANION GAP SERPL CALCULATED.3IONS-SCNC: 11 MMOL/L (ref 5–15)
ANION GAP SERPL CALCULATED.3IONS-SCNC: 4 MMOL/L (ref 5–15)
ANION GAP SERPL CALCULATED.3IONS-SCNC: 8 MMOL/L (ref 5–15)
ANION GAP SERPL CALCULATED.3IONS-SCNC: 9 MMOL/L (ref 5–15)
ANISOCYTOSIS BLD QL: ABNORMAL
ANISOCYTOSIS BLD QL: NORMAL
AST SERPL-CCNC: 10 U/L (ref 1–40)
AST SERPL-CCNC: 14 U/L (ref 1–40)
AST SERPL-CCNC: 16 U/L (ref 1–40)
B-GLOBULIN SERPL ELPH-MCNC: 0.8 G/DL (ref 0.7–1.3)
BACTERIA UR QL AUTO: ABNORMAL /HPF
BASO STIPL COARSE BLD QL SMEAR: NORMAL
BASOPHILS # BLD AUTO: 0.01 10*3/MM3 (ref 0–0.2)
BASOPHILS # BLD AUTO: 0.05 10*3/MM3 (ref 0–0.2)
BASOPHILS # BLD AUTO: 0.05 10*3/MM3 (ref 0–0.2)
BASOPHILS # BLD AUTO: 0.06 10*3/MM3 (ref 0–0.2)
BASOPHILS # BLD MANUAL: 0.03 10*3/MM3 (ref 0–0.2)
BASOPHILS NFR BLD AUTO: 0.1 % (ref 0–1.5)
BASOPHILS NFR BLD AUTO: 0.9 % (ref 0–1.5)
BASOPHILS NFR BLD AUTO: 0.9 % (ref 0–1.5)
BASOPHILS NFR BLD AUTO: 1 % (ref 0–1.5)
BASOPHILS NFR BLD AUTO: 1.2 % (ref 0–1.5)
BH BB BLOOD EXPIRATION DATE: NORMAL
BH BB BLOOD EXPIRATION DATE: NORMAL
BH BB BLOOD TYPE BARCODE: 6200
BH BB BLOOD TYPE BARCODE: 6200
BH BB DISPENSE STATUS: NORMAL
BH BB DISPENSE STATUS: NORMAL
BH BB PRODUCT CODE: NORMAL
BH BB PRODUCT CODE: NORMAL
BH BB UNIT NUMBER: NORMAL
BH BB UNIT NUMBER: NORMAL
BILIRUB SERPL-MCNC: 0.3 MG/DL (ref 0–1.2)
BILIRUB SERPL-MCNC: 0.4 MG/DL (ref 0–1.2)
BILIRUB SERPL-MCNC: 0.4 MG/DL (ref 0–1.2)
BILIRUB UR QL STRIP: NEGATIVE
BLD GP AB SCN SERPL QL: NEGATIVE
BUN SERPL-MCNC: 30 MG/DL (ref 8–23)
BUN SERPL-MCNC: 38 MG/DL (ref 8–23)
BUN SERPL-MCNC: 40 MG/DL (ref 8–23)
BUN SERPL-MCNC: 43 MG/DL (ref 8–23)
BUN SERPL-MCNC: 50 MG/DL (ref 8–23)
BUN SERPL-MCNC: 60 MG/DL (ref 8–23)
BUN SERPL-MCNC: 64 MG/DL (ref 8–23)
BUN/CREAT SERPL: 10.8 (ref 7–25)
BUN/CREAT SERPL: 11.2 (ref 7–25)
BUN/CREAT SERPL: 7.7 (ref 7–25)
BUN/CREAT SERPL: 8.3 (ref 7–25)
BUN/CREAT SERPL: 8.4 (ref 7–25)
BUN/CREAT SERPL: 8.4 (ref 7–25)
BUN/CREAT SERPL: 9.5 (ref 7–25)
CALCIUM SPEC-SCNC: 10.2 MG/DL (ref 8.6–10.5)
CALCIUM SPEC-SCNC: 10.3 MG/DL (ref 8.6–10.5)
CALCIUM SPEC-SCNC: 10.3 MG/DL (ref 8.6–10.5)
CALCIUM SPEC-SCNC: 10.5 MG/DL (ref 8.6–10.5)
CALCIUM SPEC-SCNC: 10.5 MG/DL (ref 8.6–10.5)
CALCIUM SPEC-SCNC: 10.7 MG/DL (ref 8.6–10.5)
CALCIUM SPEC-SCNC: 10.9 MG/DL (ref 8.6–10.5)
CHLORIDE SERPL-SCNC: 89 MMOL/L (ref 98–107)
CHLORIDE SERPL-SCNC: 90 MMOL/L (ref 98–107)
CHLORIDE SERPL-SCNC: 94 MMOL/L (ref 98–107)
CHLORIDE SERPL-SCNC: 94 MMOL/L (ref 98–107)
CHLORIDE SERPL-SCNC: 95 MMOL/L (ref 98–107)
CHLORIDE SERPL-SCNC: 96 MMOL/L (ref 98–107)
CHLORIDE SERPL-SCNC: 97 MMOL/L (ref 98–107)
CK SERPL-CCNC: 170 U/L (ref 20–200)
CLARITY UR: CLEAR
CO2 SERPL-SCNC: 26 MMOL/L (ref 22–29)
CO2 SERPL-SCNC: 29 MMOL/L (ref 22–29)
CO2 SERPL-SCNC: 30 MMOL/L (ref 22–29)
CO2 SERPL-SCNC: 35 MMOL/L (ref 22–29)
COLOR UR: YELLOW
CREAT SERPL-MCNC: 3.91 MG/DL (ref 0.76–1.27)
CREAT SERPL-MCNC: 4.46 MG/DL (ref 0.76–1.27)
CREAT SERPL-MCNC: 4.52 MG/DL (ref 0.76–1.27)
CREAT SERPL-MCNC: 4.78 MG/DL (ref 0.76–1.27)
CREAT SERPL-MCNC: 5.15 MG/DL (ref 0.76–1.27)
CREAT SERPL-MCNC: 5.94 MG/DL (ref 0.76–1.27)
CREAT SERPL-MCNC: 6.32 MG/DL (ref 0.76–1.27)
CROSSMATCH INTERPRETATION: NORMAL
CROSSMATCH INTERPRETATION: NORMAL
DEPRECATED RDW RBC AUTO: 63.6 FL (ref 37–54)
DEPRECATED RDW RBC AUTO: 65.1 FL (ref 37–54)
DEPRECATED RDW RBC AUTO: 68 FL (ref 37–54)
DEPRECATED RDW RBC AUTO: 68.9 FL (ref 37–54)
DEPRECATED RDW RBC AUTO: 69.5 FL (ref 37–54)
DEPRECATED RDW RBC AUTO: 69.8 FL (ref 37–54)
DEPRECATED RDW RBC AUTO: 70.9 FL (ref 37–54)
DEPRECATED RDW RBC AUTO: 72.1 FL (ref 37–54)
DEPRECATED RDW RBC AUTO: 72.9 FL (ref 37–54)
EOSINOPHIL # BLD AUTO: 0 10*3/MM3 (ref 0–0.4)
EOSINOPHIL # BLD AUTO: 0.19 10*3/MM3 (ref 0–0.4)
EOSINOPHIL # BLD AUTO: 0.28 10*3/MM3 (ref 0–0.4)
EOSINOPHIL # BLD AUTO: 0.44 10*3/MM3 (ref 0–0.4)
EOSINOPHIL # BLD MANUAL: 0.06 10*3/MM3 (ref 0–0.4)
EOSINOPHIL # BLD MANUAL: 0.32 10*3/MM3 (ref 0–0.4)
EOSINOPHIL NFR BLD AUTO: 0 % (ref 0.3–6.2)
EOSINOPHIL NFR BLD AUTO: 3.4 % (ref 0.3–6.2)
EOSINOPHIL NFR BLD AUTO: 5 % (ref 0.3–6.2)
EOSINOPHIL NFR BLD AUTO: 8.7 % (ref 0.3–6.2)
EOSINOPHIL NFR BLD MANUAL: 2 % (ref 0.3–6.2)
EOSINOPHIL NFR BLD MANUAL: 5 % (ref 0.3–6.2)
ERYTHROCYTE [DISTWIDTH] IN BLOOD BY AUTOMATED COUNT: 16.9 % (ref 12.3–15.4)
ERYTHROCYTE [DISTWIDTH] IN BLOOD BY AUTOMATED COUNT: 17.2 % (ref 12.3–15.4)
ERYTHROCYTE [DISTWIDTH] IN BLOOD BY AUTOMATED COUNT: 18.3 % (ref 12.3–15.4)
ERYTHROCYTE [DISTWIDTH] IN BLOOD BY AUTOMATED COUNT: 18.4 % (ref 12.3–15.4)
ERYTHROCYTE [DISTWIDTH] IN BLOOD BY AUTOMATED COUNT: 18.6 % (ref 12.3–15.4)
ERYTHROCYTE [DISTWIDTH] IN BLOOD BY AUTOMATED COUNT: 18.7 % (ref 12.3–15.4)
ERYTHROCYTE [DISTWIDTH] IN BLOOD BY AUTOMATED COUNT: 18.8 % (ref 12.3–15.4)
ERYTHROCYTE [DISTWIDTH] IN BLOOD BY AUTOMATED COUNT: 20.6 % (ref 12.3–15.4)
ERYTHROCYTE [DISTWIDTH] IN BLOOD BY AUTOMATED COUNT: 20.8 % (ref 12.3–15.4)
FERRITIN SERPL-MCNC: 497.2 NG/ML (ref 30–400)
FERRITIN SERPL-MCNC: 691 NG/ML (ref 30–400)
FLUAV SUBTYP SPEC NAA+PROBE: NOT DETECTED
FLUAV SUBTYP SPEC NAA+PROBE: NOT DETECTED
FLUBV RNA ISLT QL NAA+PROBE: NOT DETECTED
FLUBV RNA ISLT QL NAA+PROBE: NOT DETECTED
FOLATE SERPL-MCNC: 5.97 NG/ML (ref 4.78–24.2)
GAMMA GLOB SERPL ELPH-MCNC: 2.9 G/DL (ref 0.4–1.8)
GFR SERPL CREATININE-BSD FRML MDRD: 11 ML/MIN/1.73
GFR SERPL CREATININE-BSD FRML MDRD: 12 ML/MIN/1.73
GFR SERPL CREATININE-BSD FRML MDRD: 13 ML/MIN/1.73
GFR SERPL CREATININE-BSD FRML MDRD: 13 ML/MIN/1.73
GFR SERPL CREATININE-BSD FRML MDRD: 15 ML/MIN/1.73
GFR SERPL CREATININE-BSD FRML MDRD: 9 ML/MIN/1.73
GFR SERPL CREATININE-BSD FRML MDRD: 9 ML/MIN/1.73
GFR SERPL CREATININE-BSD FRML MDRD: ABNORMAL ML/MIN/{1.73_M2}
GLOBULIN SER-MCNC: 4.9 G/DL (ref 2.2–3.9)
GLOBULIN UR ELPH-MCNC: 4.6 GM/DL
GLOBULIN UR ELPH-MCNC: 4.9 GM/DL
GLOBULIN UR ELPH-MCNC: 4.9 GM/DL
GLUCOSE SERPL-MCNC: 101 MG/DL (ref 65–99)
GLUCOSE SERPL-MCNC: 122 MG/DL (ref 65–99)
GLUCOSE SERPL-MCNC: 128 MG/DL (ref 65–99)
GLUCOSE SERPL-MCNC: 136 MG/DL (ref 65–99)
GLUCOSE SERPL-MCNC: 141 MG/DL (ref 65–99)
GLUCOSE SERPL-MCNC: 84 MG/DL (ref 65–99)
GLUCOSE SERPL-MCNC: 99 MG/DL (ref 65–99)
GLUCOSE UR STRIP-MCNC: NEGATIVE MG/DL
HBV SURFACE AG SERPL QL IA: NORMAL
HCT VFR BLD AUTO: 19.6 % (ref 37.5–51)
HCT VFR BLD AUTO: 22.7 % (ref 37.5–51)
HCT VFR BLD AUTO: 23.6 % (ref 37.5–51)
HCT VFR BLD AUTO: 24.4 % (ref 37.5–51)
HCT VFR BLD AUTO: 24.6 % (ref 37.5–51)
HCT VFR BLD AUTO: 25.4 % (ref 37.5–51)
HCT VFR BLD AUTO: 26.4 % (ref 37.5–51)
HCT VFR BLD AUTO: 27 % (ref 37.5–51)
HCT VFR BLD AUTO: 27.6 % (ref 37.5–51)
HGB BLD-MCNC: 6.2 G/DL (ref 13–17.7)
HGB BLD-MCNC: 7.5 G/DL (ref 13–17.7)
HGB BLD-MCNC: 7.5 G/DL (ref 13–17.7)
HGB BLD-MCNC: 7.6 G/DL (ref 13–17.7)
HGB BLD-MCNC: 7.8 G/DL (ref 13–17.7)
HGB BLD-MCNC: 8.1 G/DL (ref 13–17.7)
HGB BLD-MCNC: 8.4 G/DL (ref 13–17.7)
HGB BLD-MCNC: 8.7 G/DL (ref 13–17.7)
HGB BLD-MCNC: 8.7 G/DL (ref 13–17.7)
HGB RETIC QN AUTO: 37.3 PG (ref 29.8–36.1)
HGB UR QL STRIP.AUTO: NEGATIVE
HOLD SPECIMEN: NORMAL
HYALINE CASTS UR QL AUTO: ABNORMAL /LPF
HYPOCHROMIA BLD QL: ABNORMAL
HYPOCHROMIA BLD QL: NORMAL
IGA SERPL-MCNC: 44 MG/DL (ref 61–437)
IGG SERPL-MCNC: 3223 MG/DL (ref 603–1613)
IGM SERPL-MCNC: 20 MG/DL (ref 15–143)
IMM GRANULOCYTES # BLD AUTO: 0.03 10*3/MM3 (ref 0–0.05)
IMM GRANULOCYTES # BLD AUTO: 0.04 10*3/MM3 (ref 0–0.05)
IMM GRANULOCYTES # BLD AUTO: 0.05 10*3/MM3 (ref 0–0.05)
IMM GRANULOCYTES # BLD AUTO: 0.09 10*3/MM3 (ref 0–0.05)
IMM GRANULOCYTES NFR BLD AUTO: 0.6 % (ref 0–0.5)
IMM GRANULOCYTES NFR BLD AUTO: 0.7 % (ref 0–0.5)
IMM GRANULOCYTES NFR BLD AUTO: 0.9 % (ref 0–0.5)
IMM GRANULOCYTES NFR BLD AUTO: 0.9 % (ref 0–0.5)
IMM RETICS NFR: 33 % (ref 3–15.8)
INR PPP: 1.03 (ref 0.8–1.2)
INTERPRETATION SERPL IEP-IMP: ABNORMAL
IRON 24H UR-MRATE: 63 MCG/DL (ref 59–158)
IRON 24H UR-MRATE: 71 MCG/DL (ref 59–158)
IRON SATN MFR SERPL: 18 % (ref 20–50)
IRON SATN MFR SERPL: 24 % (ref 20–50)
KAPPA LC FREE SER-MCNC: 626.9 MG/L (ref 3.3–19.4)
KAPPA LC FREE/LAMBDA FREE SER: 37.54 {RATIO} (ref 0.26–1.65)
KETONES UR QL STRIP: NEGATIVE
LAB AP CASE REPORT: NORMAL
LAB AP CASE REPORT: NORMAL
LAB AP CLINICAL INFORMATION: NORMAL
LAB AP CYTOGENETICS REPORT,ADDENDUM: NORMAL
LABORATORY COMMENT REPORT: ABNORMAL
LAMBDA LC FREE SERPL-MCNC: 16.7 MG/L (ref 5.7–26.3)
LARGE PLATELETS: ABNORMAL
LEUKOCYTE ESTERASE UR QL STRIP.AUTO: NEGATIVE
LYMPHOCYTES # BLD AUTO: 0.8 10*3/MM3 (ref 0.7–3.1)
LYMPHOCYTES # BLD AUTO: 0.83 10*3/MM3 (ref 0.7–3.1)
LYMPHOCYTES # BLD AUTO: 1.09 10*3/MM3 (ref 0.7–3.1)
LYMPHOCYTES # BLD AUTO: 1.1 10*3/MM3 (ref 0.7–3.1)
LYMPHOCYTES # BLD MANUAL: 0.74 10*3/MM3 (ref 0.7–3.1)
LYMPHOCYTES # BLD MANUAL: 1.15 10*3/MM3 (ref 0.7–3.1)
LYMPHOCYTES NFR BLD AUTO: 14.8 % (ref 19.6–45.3)
LYMPHOCYTES NFR BLD AUTO: 19.6 % (ref 19.6–45.3)
LYMPHOCYTES NFR BLD AUTO: 21.5 % (ref 19.6–45.3)
LYMPHOCYTES NFR BLD AUTO: 7.7 % (ref 19.6–45.3)
LYMPHOCYTES NFR BLD MANUAL: 18 % (ref 19.6–45.3)
LYMPHOCYTES NFR BLD MANUAL: 23 % (ref 19.6–45.3)
LYMPHOCYTES NFR BLD MANUAL: 5 % (ref 5–12)
LYMPHOCYTES NFR BLD MANUAL: 7 % (ref 5–12)
Lab: NORMAL
M PROTEIN SERPL ELPH-MCNC: 2.7 G/DL
MAGNESIUM SERPL-MCNC: 2.1 MG/DL (ref 1.6–2.4)
MCH RBC QN AUTO: 31.1 PG (ref 26.6–33)
MCH RBC QN AUTO: 31.4 PG (ref 26.6–33)
MCH RBC QN AUTO: 31.5 PG (ref 26.6–33)
MCH RBC QN AUTO: 32 PG (ref 26.6–33)
MCH RBC QN AUTO: 32.1 PG (ref 26.6–33)
MCH RBC QN AUTO: 32.5 PG (ref 26.6–33)
MCH RBC QN AUTO: 32.7 PG (ref 26.6–33)
MCH RBC QN AUTO: 33.2 PG (ref 26.6–33)
MCH RBC QN AUTO: 33.5 PG (ref 26.6–33)
MCHC RBC AUTO-ENTMCNC: 30.9 G/DL (ref 31.5–35.7)
MCHC RBC AUTO-ENTMCNC: 31.5 G/DL (ref 31.5–35.7)
MCHC RBC AUTO-ENTMCNC: 31.6 G/DL (ref 31.5–35.7)
MCHC RBC AUTO-ENTMCNC: 31.8 G/DL (ref 31.5–35.7)
MCHC RBC AUTO-ENTMCNC: 31.8 G/DL (ref 31.5–35.7)
MCHC RBC AUTO-ENTMCNC: 31.9 G/DL (ref 31.5–35.7)
MCHC RBC AUTO-ENTMCNC: 32 G/DL (ref 31.5–35.7)
MCHC RBC AUTO-ENTMCNC: 32.2 G/DL (ref 31.5–35.7)
MCHC RBC AUTO-ENTMCNC: 33 G/DL (ref 31.5–35.7)
MCV RBC AUTO: 100 FL (ref 79–97)
MCV RBC AUTO: 100.4 FL (ref 79–97)
MCV RBC AUTO: 101.7 FL (ref 79–97)
MCV RBC AUTO: 101.7 FL (ref 79–97)
MCV RBC AUTO: 102.7 FL (ref 79–97)
MCV RBC AUTO: 103.8 FL (ref 79–97)
MCV RBC AUTO: 104.8 FL (ref 79–97)
MCV RBC AUTO: 97 FL (ref 79–97)
MCV RBC AUTO: 97.9 FL (ref 79–97)
MONOCYTES # BLD AUTO: 0.22 10*3/MM3 (ref 0.1–0.9)
MONOCYTES # BLD AUTO: 0.32 10*3/MM3 (ref 0.1–0.9)
MONOCYTES # BLD AUTO: 0.52 10*3/MM3 (ref 0.1–0.9)
MONOCYTES # BLD AUTO: 0.59 10*3/MM3 (ref 0.1–0.9)
MONOCYTES # BLD AUTO: 0.75 10*3/MM3 (ref 0.1–0.9)
MONOCYTES # BLD AUTO: 0.83 10*3/MM3 (ref 0.1–0.9)
MONOCYTES NFR BLD AUTO: 10.2 % (ref 5–12)
MONOCYTES NFR BLD AUTO: 10.5 % (ref 5–12)
MONOCYTES NFR BLD AUTO: 13.3 % (ref 5–12)
MONOCYTES NFR BLD AUTO: 8 % (ref 5–12)
NEUTROPHILS # BLD AUTO: 2.04 10*3/MM3 (ref 1.7–7)
NEUTROPHILS # BLD AUTO: 4.61 10*3/MM3 (ref 1.7–7)
NEUTROPHILS NFR BLD AUTO: 2.94 10*3/MM3 (ref 1.7–7)
NEUTROPHILS NFR BLD AUTO: 3.4 10*3/MM3 (ref 1.7–7)
NEUTROPHILS NFR BLD AUTO: 3.91 10*3/MM3 (ref 1.7–7)
NEUTROPHILS NFR BLD AUTO: 57.8 % (ref 42.7–76)
NEUTROPHILS NFR BLD AUTO: 60.5 % (ref 42.7–76)
NEUTROPHILS NFR BLD AUTO: 69.5 % (ref 42.7–76)
NEUTROPHILS NFR BLD AUTO: 8.7 10*3/MM3 (ref 1.7–7)
NEUTROPHILS NFR BLD AUTO: 83.3 % (ref 42.7–76)
NEUTROPHILS NFR BLD MANUAL: 63 % (ref 42.7–76)
NEUTROPHILS NFR BLD MANUAL: 72 % (ref 42.7–76)
NEUTS BAND NFR BLD MANUAL: 3 % (ref 0–5)
NITRITE UR QL STRIP: NEGATIVE
NRBC BLD AUTO-RTO: 0 /100 WBC (ref 0–0.2)
NT-PROBNP SERPL-MCNC: 3044 PG/ML (ref 0–900)
OVALOCYTES BLD QL SMEAR: ABNORMAL
PATH REPORT.FINAL DX SPEC: NORMAL
PATH REPORT.FINAL DX SPEC: NORMAL
PH UR STRIP.AUTO: 8 [PH] (ref 5–9)
PLATELET # BLD AUTO: 212 10*3/MM3 (ref 140–450)
PLATELET # BLD AUTO: 219 10*3/MM3 (ref 140–450)
PLATELET # BLD AUTO: 259 10*3/MM3 (ref 140–450)
PLATELET # BLD AUTO: 260 10*3/MM3 (ref 140–450)
PLATELET # BLD AUTO: 273 10*3/MM3 (ref 140–450)
PLATELET # BLD AUTO: 276 10*3/MM3 (ref 140–450)
PLATELET # BLD AUTO: 277 10*3/MM3 (ref 140–450)
PLATELET # BLD AUTO: 284 10*3/MM3 (ref 140–450)
PLATELET # BLD AUTO: 365 10*3/MM3 (ref 140–450)
PMV BLD AUTO: 10.3 FL (ref 6–12)
PMV BLD AUTO: 10.4 FL (ref 6–12)
PMV BLD AUTO: 10.5 FL (ref 6–12)
PMV BLD AUTO: 10.6 FL (ref 6–12)
PMV BLD AUTO: 10.8 FL (ref 6–12)
PMV BLD AUTO: 9.6 FL (ref 6–12)
PMV BLD AUTO: 9.8 FL (ref 6–12)
PMV BLD AUTO: 9.9 FL (ref 6–12)
PMV BLD AUTO: 9.9 FL (ref 6–12)
POIKILOCYTOSIS BLD QL SMEAR: ABNORMAL
POLYCHROMASIA BLD QL SMEAR: ABNORMAL
POLYCHROMASIA BLD QL SMEAR: NORMAL
POTASSIUM SERPL-SCNC: 4.1 MMOL/L (ref 3.5–5.2)
POTASSIUM SERPL-SCNC: 4.2 MMOL/L (ref 3.5–5.2)
POTASSIUM SERPL-SCNC: 4.2 MMOL/L (ref 3.5–5.2)
POTASSIUM SERPL-SCNC: 4.3 MMOL/L (ref 3.5–5.2)
POTASSIUM SERPL-SCNC: 4.3 MMOL/L (ref 3.5–5.2)
POTASSIUM SERPL-SCNC: 4.4 MMOL/L (ref 3.5–5.2)
POTASSIUM SERPL-SCNC: 4.7 MMOL/L (ref 3.5–5.2)
PROT SERPL-MCNC: 8.3 G/DL (ref 6–8.5)
PROT SERPL-MCNC: 8.5 G/DL (ref 6–8.5)
PROT SERPL-MCNC: 8.9 G/DL (ref 6–8.5)
PROT SERPL-MCNC: 9 G/DL (ref 6–8.5)
PROT UR QL STRIP: ABNORMAL
PROTHROMBIN TIME: 13.4 SECONDS (ref 11.1–15.3)
PSA SERPL-MCNC: 0.7 NG/ML (ref 0–4)
QT INTERVAL: 372 MS
QT INTERVAL: 410 MS
QTC INTERVAL: 448 MS
QTC INTERVAL: 462 MS
RBC # BLD AUTO: 1.87 10*6/MM3 (ref 4.14–5.8)
RBC # BLD AUTO: 2.34 10*6/MM3 (ref 4.14–5.8)
RBC # BLD AUTO: 2.4 10*6/MM3 (ref 4.14–5.8)
RBC # BLD AUTO: 2.41 10*6/MM3 (ref 4.14–5.8)
RBC # BLD AUTO: 2.42 10*6/MM3 (ref 4.14–5.8)
RBC # BLD AUTO: 2.53 10*6/MM3 (ref 4.14–5.8)
RBC # BLD AUTO: 2.57 10*6/MM3 (ref 4.14–5.8)
RBC # BLD AUTO: 2.6 10*6/MM3 (ref 4.14–5.8)
RBC # BLD AUTO: 2.76 10*6/MM3 (ref 4.14–5.8)
RBC # UR: ABNORMAL /HPF
REF LAB TEST METHOD: ABNORMAL
RETICS # AUTO: 0.08 10*6/MM3 (ref 0.02–0.13)
RETICS/RBC NFR AUTO: 3.19 % (ref 0.7–1.9)
RH BLD: POSITIVE
SARS-COV-2 N GENE RESP QL NAA+PROBE: NOT DETECTED
SARS-COV-2 RNA PNL SPEC NAA+PROBE: NOT DETECTED
SARS-COV-2 RNA PNL SPEC NAA+PROBE: NOT DETECTED
SMALL PLATELETS BLD QL SMEAR: ADEQUATE
SMALL PLATELETS BLD QL SMEAR: ADEQUATE
SODIUM SERPL-SCNC: 129 MMOL/L (ref 136–145)
SODIUM SERPL-SCNC: 131 MMOL/L (ref 136–145)
SODIUM SERPL-SCNC: 131 MMOL/L (ref 136–145)
SODIUM SERPL-SCNC: 132 MMOL/L (ref 136–145)
SODIUM SERPL-SCNC: 133 MMOL/L (ref 136–145)
SODIUM SERPL-SCNC: 134 MMOL/L (ref 136–145)
SODIUM SERPL-SCNC: 135 MMOL/L (ref 136–145)
SP GR UR STRIP: 1.02 (ref 1–1.03)
SQUAMOUS #/AREA URNS HPF: ABNORMAL /HPF
T&S EXPIRATION DATE: NORMAL
TARGETS BLD QL SMEAR: ABNORMAL
TIBC SERPL-MCNC: 297 MCG/DL (ref 298–536)
TIBC SERPL-MCNC: 347 MCG/DL (ref 298–536)
TRANSFERRIN SERPL-MCNC: 199 MG/DL (ref 200–360)
TRANSFERRIN SERPL-MCNC: 233 MG/DL (ref 200–360)
TROPONIN T SERPL-MCNC: 0.04 NG/ML (ref 0–0.03)
UNIT  ABO: NORMAL
UNIT  ABO: NORMAL
UNIT  RH: NORMAL
UNIT  RH: NORMAL
UROBILINOGEN UR QL STRIP: ABNORMAL
VARIANT LYMPHS NFR BLD MANUAL: 1 % (ref 0–5)
VIT B12 BLD-MCNC: >2000 PG/ML (ref 211–946)
WBC # BLD AUTO: 10.43 10*3/MM3 (ref 3.4–10.8)
WBC # BLD AUTO: 3.09 10*3/MM3 (ref 3.4–10.8)
WBC # BLD AUTO: 5.08 10*3/MM3 (ref 3.4–10.8)
WBC # BLD AUTO: 5.62 10*3/MM3 (ref 3.4–10.8)
WBC # BLD AUTO: 5.62 10*3/MM3 (ref 3.4–10.8)
WBC # BLD AUTO: 6.1 10*3/MM3 (ref 3.4–10.8)
WBC # BLD AUTO: 6.4 10*3/MM3 (ref 3.4–10.8)
WBC # BLD AUTO: 7.51 10*3/MM3 (ref 3.4–10.8)
WBC # BLD AUTO: 9.39 10*3/MM3 (ref 3.4–10.8)
WBC MORPH BLD: NORMAL
WBC MORPH BLD: NORMAL
WBC UR QL AUTO: ABNORMAL /HPF

## 2021-01-01 PROCEDURE — G0463 HOSPITAL OUTPT CLINIC VISIT: HCPCS | Performed by: INTERNAL MEDICINE

## 2021-01-01 PROCEDURE — 85046 RETICYTE/HGB CONCENTRATE: CPT | Performed by: INTERNAL MEDICINE

## 2021-01-01 PROCEDURE — 63710000001 ONDANSETRON PER 8 MG: Performed by: INTERNAL MEDICINE

## 2021-01-01 PROCEDURE — P9016 RBC LEUKOCYTES REDUCED: HCPCS

## 2021-01-01 PROCEDURE — 1125F AMNT PAIN NOTED PAIN PRSNT: CPT | Performed by: INTERNAL MEDICINE

## 2021-01-01 PROCEDURE — 85025 COMPLETE CBC W/AUTO DIFF WBC: CPT | Performed by: EMERGENCY MEDICINE

## 2021-01-01 PROCEDURE — G0299 HHS/HOSPICE OF RN EA 15 MIN: HCPCS

## 2021-01-01 PROCEDURE — 85610 PROTHROMBIN TIME: CPT | Performed by: RADIOLOGY

## 2021-01-01 PROCEDURE — 07DR3ZX EXTRACTION OF ILIAC BONE MARROW, PERCUTANEOUS APPROACH, DIAGNOSTIC: ICD-10-PCS | Performed by: RADIOLOGY

## 2021-01-01 PROCEDURE — 25010000002 MORPHINE PER 10 MG: Performed by: INTERNAL MEDICINE

## 2021-01-01 PROCEDURE — G0378 HOSPITAL OBSERVATION PER HR: HCPCS

## 2021-01-01 PROCEDURE — 88364 INSITU HYBRIDIZATION (FISH): CPT

## 2021-01-01 PROCEDURE — 25010000002 MORPHINE PER 10 MG: Performed by: EMERGENCY MEDICINE

## 2021-01-01 PROCEDURE — 93005 ELECTROCARDIOGRAM TRACING: CPT | Performed by: FAMILY MEDICINE

## 2021-01-01 PROCEDURE — 80053 COMPREHEN METABOLIC PANEL: CPT

## 2021-01-01 PROCEDURE — 85025 COMPLETE CBC W/AUTO DIFF WBC: CPT | Performed by: INTERNAL MEDICINE

## 2021-01-01 PROCEDURE — 99285 EMERGENCY DEPT VISIT HI MDM: CPT

## 2021-01-01 PROCEDURE — 94799 UNLISTED PULMONARY SVC/PX: CPT

## 2021-01-01 PROCEDURE — 97530 THERAPEUTIC ACTIVITIES: CPT

## 2021-01-01 PROCEDURE — 88305 TISSUE EXAM BY PATHOLOGIST: CPT

## 2021-01-01 PROCEDURE — 88365 INSITU HYBRIDIZATION (FISH): CPT

## 2021-01-01 PROCEDURE — 84153 ASSAY OF PSA TOTAL: CPT | Performed by: INTERNAL MEDICINE

## 2021-01-01 PROCEDURE — 25010000002 HYDROMORPHONE 1 MG/ML SOLUTION: Performed by: EMERGENCY MEDICINE

## 2021-01-01 PROCEDURE — 1123F ACP DISCUSS/DSCN MKR DOCD: CPT | Performed by: INTERNAL MEDICINE

## 2021-01-01 PROCEDURE — 86900 BLOOD TYPING SEROLOGIC ABO: CPT

## 2021-01-01 PROCEDURE — 6520001 HSPC ROUTINE CARE

## 2021-01-01 PROCEDURE — 86923 COMPATIBILITY TEST ELECTRIC: CPT

## 2021-01-01 PROCEDURE — 82550 ASSAY OF CK (CPK): CPT | Performed by: FAMILY MEDICINE

## 2021-01-01 PROCEDURE — 36430 TRANSFUSION BLD/BLD COMPNT: CPT

## 2021-01-01 PROCEDURE — 82607 VITAMIN B-12: CPT | Performed by: INTERNAL MEDICINE

## 2021-01-01 PROCEDURE — 85025 COMPLETE CBC W/AUTO DIFF WBC: CPT | Performed by: FAMILY MEDICINE

## 2021-01-01 PROCEDURE — 25010000002 HEPARIN (PORCINE) PER 1000 UNITS: Performed by: INTERNAL MEDICINE

## 2021-01-01 PROCEDURE — 25010000002 MORPHINE PER 10 MG: Performed by: NURSE PRACTITIONER

## 2021-01-01 PROCEDURE — 99222 1ST HOSP IP/OBS MODERATE 55: CPT | Performed by: INTERNAL MEDICINE

## 2021-01-01 PROCEDURE — 88342 IMHCHEM/IMCYTCHM 1ST ANTB: CPT

## 2021-01-01 PROCEDURE — 86901 BLOOD TYPING SEROLOGIC RH(D): CPT | Performed by: FAMILY MEDICINE

## 2021-01-01 PROCEDURE — 83540 ASSAY OF IRON: CPT | Performed by: INTERNAL MEDICINE

## 2021-01-01 PROCEDURE — 82728 ASSAY OF FERRITIN: CPT | Performed by: INTERNAL MEDICINE

## 2021-01-01 PROCEDURE — 97162 PT EVAL MOD COMPLEX 30 MIN: CPT

## 2021-01-01 PROCEDURE — 81001 URINALYSIS AUTO W/SCOPE: CPT | Performed by: EMERGENCY MEDICINE

## 2021-01-01 PROCEDURE — 93010 ELECTROCARDIOGRAM REPORT: CPT | Performed by: INTERNAL MEDICINE

## 2021-01-01 PROCEDURE — 84484 ASSAY OF TROPONIN QUANT: CPT | Performed by: FAMILY MEDICINE

## 2021-01-01 PROCEDURE — 86901 BLOOD TYPING SEROLOGIC RH(D): CPT

## 2021-01-01 PROCEDURE — 99215 OFFICE O/P EST HI 40 MIN: CPT | Performed by: INTERNAL MEDICINE

## 2021-01-01 PROCEDURE — 25010000002 EPOETIN ALFA PER 1000 UNITS: Performed by: INTERNAL MEDICINE

## 2021-01-01 PROCEDURE — C9803 HOPD COVID-19 SPEC COLLECT: HCPCS

## 2021-01-01 PROCEDURE — 85027 COMPLETE CBC AUTOMATED: CPT | Performed by: NURSE PRACTITIONER

## 2021-01-01 PROCEDURE — 25010000003 MORPHINE PER 10 MG: Performed by: NURSE PRACTITIONER

## 2021-01-01 PROCEDURE — 63710000001 DEXAMETHASONE PER 0.25 MG: Performed by: INTERNAL MEDICINE

## 2021-01-01 PROCEDURE — 83883 ASSAY NEPHELOMETRY NOT SPEC: CPT | Performed by: INTERNAL MEDICINE

## 2021-01-01 PROCEDURE — 25010000002 NA FERRIC GLUC CPLX PER 12.5 MG: Performed by: INTERNAL MEDICINE

## 2021-01-01 PROCEDURE — G9903 PT SCRN TBCO ID AS NON USER: HCPCS | Performed by: INTERNAL MEDICINE

## 2021-01-01 PROCEDURE — 85007 BL SMEAR W/DIFF WBC COUNT: CPT | Performed by: FAMILY MEDICINE

## 2021-01-01 PROCEDURE — 25010000002 MORPHINE PER 10 MG: Performed by: HOSPITALIST

## 2021-01-01 PROCEDURE — 91300 HC SARSCOV02 VAC 30MCG/0.3ML IM: CPT | Performed by: THORACIC SURGERY (CARDIOTHORACIC VASCULAR SURGERY)

## 2021-01-01 PROCEDURE — 25010000002 FUROSEMIDE PER 20 MG: Performed by: INTERNAL MEDICINE

## 2021-01-01 PROCEDURE — G0257 UNSCHED DIALYSIS ESRD PT HOS: HCPCS

## 2021-01-01 PROCEDURE — 72131 CT LUMBAR SPINE W/O DYE: CPT

## 2021-01-01 PROCEDURE — 97116 GAIT TRAINING THERAPY: CPT

## 2021-01-01 PROCEDURE — 99233 SBSQ HOSP IP/OBS HIGH 50: CPT | Performed by: INTERNAL MEDICINE

## 2021-01-01 PROCEDURE — 77074 RADEX OSSEOUS SURVEY LMTD: CPT

## 2021-01-01 PROCEDURE — 25010000002 PROMETHAZINE PER 50 MG: Performed by: INTERNAL MEDICINE

## 2021-01-01 PROCEDURE — 80048 BASIC METABOLIC PNL TOTAL CA: CPT | Performed by: NURSE PRACTITIONER

## 2021-01-01 PROCEDURE — 36415 COLL VENOUS BLD VENIPUNCTURE: CPT | Performed by: NURSE PRACTITIONER

## 2021-01-01 PROCEDURE — 84466 ASSAY OF TRANSFERRIN: CPT | Performed by: INTERNAL MEDICINE

## 2021-01-01 PROCEDURE — 0002A: CPT | Performed by: THORACIC SURGERY (CARDIOTHORACIC VASCULAR SURGERY)

## 2021-01-01 PROCEDURE — 94760 N-INVAS EAR/PLS OXIMETRY 1: CPT

## 2021-01-01 PROCEDURE — 25010000002 ONDANSETRON PER 1 MG: Performed by: EMERGENCY MEDICINE

## 2021-01-01 PROCEDURE — 99232 SBSQ HOSP IP/OBS MODERATE 35: CPT | Performed by: INTERNAL MEDICINE

## 2021-01-01 PROCEDURE — 77012 CT SCAN FOR NEEDLE BIOPSY: CPT

## 2021-01-01 PROCEDURE — 82746 ASSAY OF FOLIC ACID SERUM: CPT | Performed by: INTERNAL MEDICINE

## 2021-01-01 PROCEDURE — 99214 OFFICE O/P EST MOD 30 MIN: CPT | Performed by: NURSE PRACTITIONER

## 2021-01-01 PROCEDURE — 85007 BL SMEAR W/DIFF WBC COUNT: CPT | Performed by: INTERNAL MEDICINE

## 2021-01-01 PROCEDURE — 87636 SARSCOV2 & INF A&B AMP PRB: CPT | Performed by: FAMILY MEDICINE

## 2021-01-01 PROCEDURE — 83735 ASSAY OF MAGNESIUM: CPT | Performed by: FAMILY MEDICINE

## 2021-01-01 PROCEDURE — 87635 SARS-COV-2 COVID-19 AMP PRB: CPT | Performed by: FAMILY MEDICINE

## 2021-01-01 PROCEDURE — 88313 SPECIAL STAINS GROUP 2: CPT

## 2021-01-01 PROCEDURE — 84165 PROTEIN E-PHORESIS SERUM: CPT | Performed by: INTERNAL MEDICINE

## 2021-01-01 PROCEDURE — 85097 BONE MARROW INTERPRETATION: CPT

## 2021-01-01 PROCEDURE — 86900 BLOOD TYPING SEROLOGIC ABO: CPT | Performed by: FAMILY MEDICINE

## 2021-01-01 PROCEDURE — 5A1D70Z PERFORMANCE OF URINARY FILTRATION, INTERMITTENT, LESS THAN 6 HOURS PER DAY: ICD-10-PCS | Performed by: INTERNAL MEDICINE

## 2021-01-01 PROCEDURE — 88341 IMHCHEM/IMCYTCHM EA ADD ANTB: CPT

## 2021-01-01 PROCEDURE — 0001A: CPT | Performed by: THORACIC SURGERY (CARDIOTHORACIC VASCULAR SURGERY)

## 2021-01-01 PROCEDURE — 80048 BASIC METABOLIC PNL TOTAL CA: CPT | Performed by: INTERNAL MEDICINE

## 2021-01-01 PROCEDURE — 97166 OT EVAL MOD COMPLEX 45 MIN: CPT

## 2021-01-01 PROCEDURE — 25010000002 HYDRALAZINE PER 20 MG

## 2021-01-01 PROCEDURE — 83880 ASSAY OF NATRIURETIC PEPTIDE: CPT | Performed by: FAMILY MEDICINE

## 2021-01-01 PROCEDURE — 25010000002 ONDANSETRON PER 1 MG: Performed by: NURSE PRACTITIONER

## 2021-01-01 PROCEDURE — 87635 SARS-COV-2 COVID-19 AMP PRB: CPT

## 2021-01-01 PROCEDURE — 99204 OFFICE O/P NEW MOD 45 MIN: CPT | Performed by: INTERNAL MEDICINE

## 2021-01-01 PROCEDURE — 71045 X-RAY EXAM CHEST 1 VIEW: CPT

## 2021-01-01 PROCEDURE — 87636 SARSCOV2 & INF A&B AMP PRB: CPT

## 2021-01-01 PROCEDURE — 25010000002 PROPOFOL 10 MG/ML EMULSION: Performed by: NURSE ANESTHETIST, CERTIFIED REGISTERED

## 2021-01-01 PROCEDURE — 87340 HEPATITIS B SURFACE AG IA: CPT | Performed by: INTERNAL MEDICINE

## 2021-01-01 PROCEDURE — 82784 ASSAY IGA/IGD/IGG/IGM EACH: CPT | Performed by: INTERNAL MEDICINE

## 2021-01-01 PROCEDURE — 86850 RBC ANTIBODY SCREEN: CPT | Performed by: FAMILY MEDICINE

## 2021-01-01 PROCEDURE — 99214 OFFICE O/P EST MOD 30 MIN: CPT | Performed by: INTERNAL MEDICINE

## 2021-01-01 PROCEDURE — 93005 ELECTROCARDIOGRAM TRACING: CPT

## 2021-01-01 PROCEDURE — 86334 IMMUNOFIX E-PHORESIS SERUM: CPT | Performed by: INTERNAL MEDICINE

## 2021-01-01 PROCEDURE — 80053 COMPREHEN METABOLIC PANEL: CPT | Performed by: EMERGENCY MEDICINE

## 2021-01-01 PROCEDURE — 93005 ELECTROCARDIOGRAM TRACING: CPT | Performed by: EMERGENCY MEDICINE

## 2021-01-01 PROCEDURE — 88311 DECALCIFY TISSUE: CPT

## 2021-01-01 PROCEDURE — 80053 COMPREHEN METABOLIC PANEL: CPT | Performed by: INTERNAL MEDICINE

## 2021-01-01 RX ORDER — BISACODYL 10 MG
10 SUPPOSITORY, RECTAL RECTAL DAILY
Status: CANCELLED | OUTPATIENT
Start: 2021-07-22

## 2021-01-01 RX ORDER — AMITRIPTYLINE HYDROCHLORIDE 25 MG/1
25 TABLET, FILM COATED ORAL NIGHTLY
Status: DISCONTINUED | OUTPATIENT
Start: 2021-01-01 | End: 2021-01-01 | Stop reason: HOSPADM

## 2021-01-01 RX ORDER — ONDANSETRON 2 MG/ML
4 INJECTION INTRAMUSCULAR; INTRAVENOUS EVERY 6 HOURS PRN
Status: DISCONTINUED | OUTPATIENT
Start: 2021-01-01 | End: 2021-07-22 | Stop reason: HOSPADM

## 2021-01-01 RX ORDER — HEPARIN SODIUM 5000 [USP'U]/ML
5000 INJECTION, SOLUTION INTRAVENOUS; SUBCUTANEOUS EVERY 8 HOURS SCHEDULED
Status: DISCONTINUED | OUTPATIENT
Start: 2021-01-01 | End: 2021-01-01

## 2021-01-01 RX ORDER — MORPHINE SULFATE 2 MG/ML
1 INJECTION, SOLUTION INTRAMUSCULAR; INTRAVENOUS EVERY 4 HOURS PRN
Status: DISCONTINUED | OUTPATIENT
Start: 2021-01-01 | End: 2021-01-01

## 2021-01-01 RX ORDER — HYDRALAZINE HYDROCHLORIDE 25 MG/1
25 TABLET, FILM COATED ORAL EVERY 8 HOURS SCHEDULED
Qty: 90 TABLET | Refills: 0 | Status: SHIPPED | OUTPATIENT
Start: 2021-01-01

## 2021-01-01 RX ORDER — ALPRAZOLAM 0.5 MG/1
0.5 TABLET ORAL ONCE
Qty: 1 TABLET | Refills: 0 | Status: SHIPPED | OUTPATIENT
Start: 2021-01-01 | End: 2021-01-01

## 2021-01-01 RX ORDER — POLYETHYLENE GLYCOL 3350 17 G/17G
17 POWDER, FOR SOLUTION ORAL DAILY
Status: DISCONTINUED | OUTPATIENT
Start: 2021-01-01 | End: 2021-01-01

## 2021-01-01 RX ORDER — OXYCODONE HYDROCHLORIDE 5 MG/1
10 TABLET ORAL EVERY 4 HOURS PRN
Status: DISCONTINUED | OUTPATIENT
Start: 2021-01-01 | End: 2021-01-01

## 2021-01-01 RX ORDER — OXYCODONE HYDROCHLORIDE 5 MG/1
30 TABLET ORAL EVERY 4 HOURS
Status: DISCONTINUED | OUTPATIENT
Start: 2021-01-01 | End: 2021-01-01

## 2021-01-01 RX ORDER — ROSUVASTATIN CALCIUM 10 MG/1
10 TABLET, COATED ORAL NIGHTLY
Status: DISCONTINUED | OUTPATIENT
Start: 2021-01-01 | End: 2021-01-01

## 2021-01-01 RX ORDER — FOLIC ACID 1 MG/1
1 TABLET ORAL DAILY
Qty: 90 TABLET | Refills: 1 | Status: SHIPPED | OUTPATIENT
Start: 2021-01-01

## 2021-01-01 RX ORDER — LIDOCAINE HYDROCHLORIDE 20 MG/ML
INJECTION, SOLUTION EPIDURAL; INFILTRATION; INTRACAUDAL; PERINEURAL AS NEEDED
Status: DISCONTINUED | OUTPATIENT
Start: 2021-01-01 | End: 2021-01-01 | Stop reason: SURG

## 2021-01-01 RX ORDER — FENTANYL 25 UG/H
1 PATCH TRANSDERMAL
Status: DISCONTINUED | OUTPATIENT
Start: 2021-07-23 | End: 2021-07-22 | Stop reason: HOSPADM

## 2021-01-01 RX ORDER — AMITRIPTYLINE HYDROCHLORIDE 25 MG/1
25 TABLET, FILM COATED ORAL NIGHTLY
COMMUNITY

## 2021-01-01 RX ORDER — MORPHINE SULFATE 1 MG/ML
INJECTION INTRAVENOUS CONTINUOUS
Status: CANCELLED | OUTPATIENT
Start: 2021-01-01 | End: 2021-07-23

## 2021-01-01 RX ORDER — HYDROCODONE BITARTRATE AND ACETAMINOPHEN 7.5; 325 MG/1; MG/1
1 TABLET ORAL EVERY 6 HOURS PRN
Qty: 60 TABLET | Refills: 0 | Status: SHIPPED | OUTPATIENT
Start: 2021-01-01

## 2021-01-01 RX ORDER — ALBUMIN (HUMAN) 12.5 G/50ML
12.5 SOLUTION INTRAVENOUS AS NEEDED
Status: DISCONTINUED | OUTPATIENT
Start: 2021-01-01 | End: 2021-01-01 | Stop reason: HOSPADM

## 2021-01-01 RX ORDER — ALBUMIN (HUMAN) 12.5 G/50ML
12.5 SOLUTION INTRAVENOUS AS NEEDED
Status: ACTIVE | OUTPATIENT
Start: 2021-01-01 | End: 2021-01-01

## 2021-01-01 RX ORDER — HYDROCODONE BITARTRATE AND ACETAMINOPHEN 5; 325 MG/1; MG/1
1 TABLET ORAL EVERY 4 HOURS PRN
Status: DISCONTINUED | OUTPATIENT
Start: 2021-01-01 | End: 2021-01-01

## 2021-01-01 RX ORDER — ASPIRIN 81 MG/1
81 TABLET ORAL DAILY
Status: DISCONTINUED | OUTPATIENT
Start: 2021-01-01 | End: 2021-01-01 | Stop reason: HOSPADM

## 2021-01-01 RX ORDER — METOPROLOL SUCCINATE 50 MG/1
50 TABLET, EXTENDED RELEASE ORAL
Status: DISCONTINUED | OUTPATIENT
Start: 2021-01-01 | End: 2021-01-01 | Stop reason: HOSPADM

## 2021-01-01 RX ORDER — HYDROCODONE BITARTRATE AND ACETAMINOPHEN 5; 325 MG/1; MG/1
2 TABLET ORAL EVERY 4 HOURS PRN
Status: DISCONTINUED | OUTPATIENT
Start: 2021-01-01 | End: 2021-01-01 | Stop reason: HOSPADM

## 2021-01-01 RX ORDER — ONDANSETRON 2 MG/ML
4 INJECTION INTRAMUSCULAR; INTRAVENOUS EVERY 6 HOURS PRN
Status: CANCELLED | OUTPATIENT
Start: 2021-01-01

## 2021-01-01 RX ORDER — MORPHINE SULFATE 2 MG/ML
1 INJECTION, SOLUTION INTRAMUSCULAR; INTRAVENOUS ONCE
Status: COMPLETED | OUTPATIENT
Start: 2021-01-01 | End: 2021-01-01

## 2021-01-01 RX ORDER — SODIUM CHLORIDE 0.9 % (FLUSH) 0.9 %
10 SYRINGE (ML) INJECTION EVERY 12 HOURS SCHEDULED
Status: DISCONTINUED | OUTPATIENT
Start: 2021-01-01 | End: 2021-01-01 | Stop reason: HOSPADM

## 2021-01-01 RX ORDER — DEXAMETHASONE 4 MG/1
20 TABLET ORAL
Status: DISCONTINUED | OUTPATIENT
Start: 2021-01-01 | End: 2021-01-01

## 2021-01-01 RX ORDER — NALOXONE HCL 0.4 MG/ML
0.4 VIAL (ML) INJECTION
Status: DISCONTINUED | OUTPATIENT
Start: 2021-01-01 | End: 2021-01-01

## 2021-01-01 RX ORDER — MORPHINE SULFATE 1 MG/ML
INJECTION INTRAVENOUS CONTINUOUS
Status: DISCONTINUED | OUTPATIENT
Start: 2021-01-01 | End: 2021-01-01

## 2021-01-01 RX ORDER — SCOLOPAMINE TRANSDERMAL SYSTEM 1 MG/1
1 PATCH, EXTENDED RELEASE TRANSDERMAL
Status: CANCELLED | OUTPATIENT
Start: 2021-07-24

## 2021-01-01 RX ORDER — SEVELAMER CARBONATE 800 MG/1
2400 TABLET, FILM COATED ORAL
Status: DISCONTINUED | OUTPATIENT
Start: 2021-01-01 | End: 2021-01-01

## 2021-01-01 RX ORDER — POLYETHYLENE GLYCOL 3350 17 G/17G
17 POWDER, FOR SOLUTION ORAL DAILY PRN
Status: DISCONTINUED | OUTPATIENT
Start: 2021-01-01 | End: 2021-01-01

## 2021-01-01 RX ORDER — FOLIC ACID 1 MG/1
1 TABLET ORAL DAILY
Status: DISCONTINUED | OUTPATIENT
Start: 2021-01-01 | End: 2021-01-01 | Stop reason: HOSPADM

## 2021-01-01 RX ORDER — SODIUM CHLORIDE 0.9 % (FLUSH) 0.9 %
10 SYRINGE (ML) INJECTION EVERY 12 HOURS SCHEDULED
Status: DISCONTINUED | OUTPATIENT
Start: 2021-01-01 | End: 2021-01-01

## 2021-01-01 RX ORDER — ALPRAZOLAM 0.5 MG/1
TABLET ORAL
COMMUNITY
Start: 2021-01-01 | End: 2021-01-01

## 2021-01-01 RX ORDER — SCOLOPAMINE TRANSDERMAL SYSTEM 1 MG/1
1 PATCH, EXTENDED RELEASE TRANSDERMAL
Status: DISCONTINUED | OUTPATIENT
Start: 2021-07-24 | End: 2021-07-22 | Stop reason: HOSPADM

## 2021-01-01 RX ORDER — ACETAMINOPHEN 325 MG/1
650 TABLET ORAL EVERY 4 HOURS PRN
Status: DISCONTINUED | OUTPATIENT
Start: 2021-01-01 | End: 2021-07-22 | Stop reason: HOSPADM

## 2021-01-01 RX ORDER — PROPOFOL 10 MG/ML
VIAL (ML) INTRAVENOUS AS NEEDED
Status: DISCONTINUED | OUTPATIENT
Start: 2021-01-01 | End: 2021-01-01 | Stop reason: SURG

## 2021-01-01 RX ORDER — PROMETHAZINE HYDROCHLORIDE 25 MG/1
25 SUPPOSITORY RECTAL ONCE AS NEEDED
Status: DISCONTINUED | OUTPATIENT
Start: 2021-01-01 | End: 2021-01-01 | Stop reason: HOSPADM

## 2021-01-01 RX ORDER — HYDRALAZINE HYDROCHLORIDE 25 MG/1
25 TABLET, FILM COATED ORAL EVERY 8 HOURS SCHEDULED
Status: DISCONTINUED | OUTPATIENT
Start: 2021-01-01 | End: 2021-01-01

## 2021-01-01 RX ORDER — OXYCODONE HYDROCHLORIDE 5 MG/1
10 TABLET ORAL EVERY 4 HOURS
Status: DISCONTINUED | OUTPATIENT
Start: 2021-01-01 | End: 2021-01-01

## 2021-01-01 RX ORDER — LIDOCAINE AND PRILOCAINE 25; 25 MG/G; MG/G
CREAM TOPICAL AS NEEDED
Status: DISCONTINUED | OUTPATIENT
Start: 2021-01-01 | End: 2021-01-01

## 2021-01-01 RX ORDER — SEVELAMER CARBONATE 800 MG/1
800 TABLET, FILM COATED ORAL
Status: DISCONTINUED | OUTPATIENT
Start: 2021-01-01 | End: 2021-01-01 | Stop reason: HOSPADM

## 2021-01-01 RX ORDER — HYDRALAZINE HYDROCHLORIDE 20 MG/ML
10 INJECTION INTRAMUSCULAR; INTRAVENOUS EVERY 6 HOURS PRN
Status: DISCONTINUED | OUTPATIENT
Start: 2021-01-01 | End: 2021-01-01 | Stop reason: HOSPADM

## 2021-01-01 RX ORDER — MEPERIDINE HYDROCHLORIDE 25 MG/ML
12.5 INJECTION INTRAMUSCULAR; INTRAVENOUS; SUBCUTANEOUS
Status: DISCONTINUED | OUTPATIENT
Start: 2021-01-01 | End: 2021-01-01 | Stop reason: HOSPADM

## 2021-01-01 RX ORDER — FUROSEMIDE 10 MG/ML
80 INJECTION INTRAMUSCULAR; INTRAVENOUS ONCE
Status: COMPLETED | OUTPATIENT
Start: 2021-01-01 | End: 2021-01-01

## 2021-01-01 RX ORDER — HYDROCODONE BITARTRATE AND ACETAMINOPHEN 10; 325 MG/1; MG/1
1 TABLET ORAL ONCE
Status: COMPLETED | OUTPATIENT
Start: 2021-01-01 | End: 2021-01-01

## 2021-01-01 RX ORDER — ONDANSETRON 2 MG/ML
4 INJECTION INTRAMUSCULAR; INTRAVENOUS ONCE
Status: COMPLETED | OUTPATIENT
Start: 2021-01-01 | End: 2021-01-01

## 2021-01-01 RX ORDER — ONDANSETRON 4 MG/1
4 TABLET, FILM COATED ORAL EVERY 6 HOURS PRN
Status: DISCONTINUED | OUTPATIENT
Start: 2021-01-01 | End: 2021-01-01

## 2021-01-01 RX ORDER — FOLIC ACID 1 MG/1
1 TABLET ORAL DAILY
Status: DISCONTINUED | OUTPATIENT
Start: 2021-01-01 | End: 2021-01-01

## 2021-01-01 RX ORDER — HEPARIN SODIUM 1000 [USP'U]/ML
2000 INJECTION, SOLUTION INTRAVENOUS; SUBCUTANEOUS AS NEEDED
Status: DISCONTINUED | OUTPATIENT
Start: 2021-01-01 | End: 2021-01-01

## 2021-01-01 RX ORDER — FENTANYL 12 UG/H
1 PATCH TRANSDERMAL
Status: DISCONTINUED | OUTPATIENT
Start: 2021-01-01 | End: 2021-01-01

## 2021-01-01 RX ORDER — HYDRALAZINE HYDROCHLORIDE 25 MG/1
25 TABLET, FILM COATED ORAL EVERY 8 HOURS SCHEDULED
Status: DISCONTINUED | OUTPATIENT
Start: 2021-01-01 | End: 2021-01-01 | Stop reason: HOSPADM

## 2021-01-01 RX ORDER — FENTANYL 25 UG/H
1 PATCH TRANSDERMAL
Status: CANCELLED | OUTPATIENT
Start: 2021-07-23 | End: 2021-07-26

## 2021-01-01 RX ORDER — MORPHINE SULFATE 1 MG/ML
INJECTION INTRAVENOUS CONTINUOUS
Status: DISCONTINUED | OUTPATIENT
Start: 2021-01-01 | End: 2021-07-22 | Stop reason: HOSPADM

## 2021-01-01 RX ORDER — PANTOPRAZOLE SODIUM 40 MG/10ML
40 INJECTION, POWDER, LYOPHILIZED, FOR SOLUTION INTRAVENOUS
Status: DISCONTINUED | OUTPATIENT
Start: 2021-01-01 | End: 2021-01-01 | Stop reason: HOSPADM

## 2021-01-01 RX ORDER — ATROPINE SULFATE 10 MG/ML
2 SOLUTION/ DROPS OPHTHALMIC 4 TIMES DAILY
Status: DISCONTINUED | OUTPATIENT
Start: 2021-01-01 | End: 2021-01-01 | Stop reason: HOSPADM

## 2021-01-01 RX ORDER — LEVOTHYROXINE SODIUM 0.05 MG/1
50 TABLET ORAL DAILY
Status: DISCONTINUED | OUTPATIENT
Start: 2021-01-01 | End: 2021-01-01

## 2021-01-01 RX ORDER — ATROPINE SULFATE 10 MG/ML
2 SOLUTION/ DROPS OPHTHALMIC 4 TIMES DAILY
Status: DISCONTINUED | OUTPATIENT
Start: 2021-01-01 | End: 2021-07-22 | Stop reason: HOSPADM

## 2021-01-01 RX ORDER — PROMETHAZINE HYDROCHLORIDE 25 MG/1
25 TABLET ORAL ONCE AS NEEDED
Status: DISCONTINUED | OUTPATIENT
Start: 2021-01-01 | End: 2021-01-01 | Stop reason: HOSPADM

## 2021-01-01 RX ORDER — ACETAMINOPHEN 325 MG/1
650 TABLET ORAL EVERY 4 HOURS PRN
Status: DISCONTINUED | OUTPATIENT
Start: 2021-01-01 | End: 2021-01-01 | Stop reason: HOSPADM

## 2021-01-01 RX ORDER — ALLOPURINOL 100 MG/1
100 TABLET ORAL DAILY
Status: DISCONTINUED | OUTPATIENT
Start: 2021-01-01 | End: 2021-01-01 | Stop reason: HOSPADM

## 2021-01-01 RX ORDER — ALBUTEROL SULFATE 90 UG/1
AEROSOL, METERED RESPIRATORY (INHALATION)
COMMUNITY
Start: 2021-01-01

## 2021-01-01 RX ORDER — OXYCODONE HYDROCHLORIDE 5 MG/1
5 TABLET ORAL EVERY 4 HOURS PRN
Status: DISCONTINUED | OUTPATIENT
Start: 2021-01-01 | End: 2021-01-01

## 2021-01-01 RX ORDER — OXYCODONE HCL 10 MG/1
10 TABLET, FILM COATED, EXTENDED RELEASE ORAL EVERY 12 HOURS SCHEDULED
Status: DISCONTINUED | OUTPATIENT
Start: 2021-01-01 | End: 2021-01-01

## 2021-01-01 RX ORDER — MORPHINE SULFATE 2 MG/ML
2 INJECTION, SOLUTION INTRAMUSCULAR; INTRAVENOUS EVERY 4 HOURS PRN
Status: DISCONTINUED | OUTPATIENT
Start: 2021-01-01 | End: 2021-01-01

## 2021-01-01 RX ORDER — MORPHINE SULFATE 2 MG/ML
2 INJECTION, SOLUTION INTRAMUSCULAR; INTRAVENOUS ONCE
Status: COMPLETED | OUTPATIENT
Start: 2021-01-01 | End: 2021-01-01

## 2021-01-01 RX ORDER — HYDRALAZINE HYDROCHLORIDE 20 MG/ML
INJECTION INTRAMUSCULAR; INTRAVENOUS
Status: COMPLETED
Start: 2021-01-01 | End: 2021-01-01

## 2021-01-01 RX ORDER — ASPIRIN 81 MG/1
81 TABLET ORAL DAILY
Status: DISCONTINUED | OUTPATIENT
Start: 2021-01-01 | End: 2021-01-01

## 2021-01-01 RX ORDER — SODIUM CHLORIDE 9 MG/ML
50 INJECTION, SOLUTION INTRAVENOUS CONTINUOUS
Status: DISCONTINUED | OUTPATIENT
Start: 2021-01-01 | End: 2021-01-01

## 2021-01-01 RX ORDER — AMLODIPINE BESYLATE 10 MG/1
10 TABLET ORAL
Status: DISCONTINUED | OUTPATIENT
Start: 2021-01-01 | End: 2021-01-01

## 2021-01-01 RX ORDER — LIDOCAINE AND PRILOCAINE 25; 25 MG/G; MG/G
CREAM TOPICAL
COMMUNITY
Start: 2021-01-01

## 2021-01-01 RX ORDER — ALLOPURINOL 100 MG/1
100 TABLET ORAL DAILY
Status: DISCONTINUED | OUTPATIENT
Start: 2021-01-01 | End: 2021-01-01

## 2021-01-01 RX ORDER — DEXAMETHASONE 2 MG/1
2 TABLET ORAL 3 TIMES DAILY
COMMUNITY

## 2021-01-01 RX ORDER — SODIUM CHLORIDE 0.9 % (FLUSH) 0.9 %
10 SYRINGE (ML) INJECTION AS NEEDED
Status: DISCONTINUED | OUTPATIENT
Start: 2021-01-01 | End: 2021-01-01

## 2021-01-01 RX ORDER — OXYCODONE HCL 20 MG/1
20 TABLET, FILM COATED, EXTENDED RELEASE ORAL EVERY 12 HOURS SCHEDULED
Status: DISCONTINUED | OUTPATIENT
Start: 2021-01-01 | End: 2021-01-01

## 2021-01-01 RX ORDER — SODIUM POLYSTYRENE SULFONATE 15 G/60ML
15 SUSPENSION ORAL; RECTAL DAILY
COMMUNITY

## 2021-01-01 RX ORDER — ONDANSETRON 2 MG/ML
4 INJECTION INTRAMUSCULAR; INTRAVENOUS EVERY 6 HOURS PRN
Status: DISCONTINUED | OUTPATIENT
Start: 2021-01-01 | End: 2021-01-01 | Stop reason: HOSPADM

## 2021-01-01 RX ORDER — AMITRIPTYLINE HYDROCHLORIDE 25 MG/1
25 TABLET, FILM COATED ORAL NIGHTLY
Status: DISCONTINUED | OUTPATIENT
Start: 2021-01-01 | End: 2021-01-01

## 2021-01-01 RX ORDER — SODIUM CHLORIDE 9 MG/ML
INJECTION, SOLUTION INTRAVENOUS
Status: COMPLETED
Start: 2021-01-01 | End: 2021-01-01

## 2021-01-01 RX ORDER — BISACODYL 5 MG/1
10 TABLET, DELAYED RELEASE ORAL DAILY
Status: DISCONTINUED | OUTPATIENT
Start: 2021-01-01 | End: 2021-01-01

## 2021-01-01 RX ORDER — ACETAMINOPHEN 325 MG/1
650 TABLET ORAL EVERY 4 HOURS PRN
Status: CANCELLED | OUTPATIENT
Start: 2021-01-01

## 2021-01-01 RX ORDER — BISACODYL 10 MG
10 SUPPOSITORY, RECTAL RECTAL DAILY
Status: DISCONTINUED | OUTPATIENT
Start: 2021-07-22 | End: 2021-07-22 | Stop reason: HOSPADM

## 2021-01-01 RX ORDER — LEVOTHYROXINE SODIUM 0.05 MG/1
50 TABLET ORAL DAILY
Status: DISCONTINUED | OUTPATIENT
Start: 2021-01-01 | End: 2021-01-01 | Stop reason: HOSPADM

## 2021-01-01 RX ORDER — LIDOCAINE AND PRILOCAINE 25; 25 MG/G; MG/G
CREAM TOPICAL AS NEEDED
Status: DISCONTINUED | OUTPATIENT
Start: 2021-01-01 | End: 2021-01-01 | Stop reason: HOSPADM

## 2021-01-01 RX ORDER — DOCUSATE SODIUM 100 MG/1
100 CAPSULE, LIQUID FILLED ORAL 2 TIMES DAILY
COMMUNITY

## 2021-01-01 RX ORDER — SODIUM POLYSTYRENE SULFONATE 15 G/60ML
15 SUSPENSION ORAL; RECTAL DAILY
Status: DISCONTINUED | OUTPATIENT
Start: 2021-01-01 | End: 2021-01-01

## 2021-01-01 RX ORDER — SODIUM CHLORIDE 0.9 % (FLUSH) 0.9 %
10 SYRINGE (ML) INJECTION AS NEEDED
Status: DISCONTINUED | OUTPATIENT
Start: 2021-01-01 | End: 2021-01-01 | Stop reason: HOSPADM

## 2021-01-01 RX ORDER — BISACODYL 10 MG
10 SUPPOSITORY, RECTAL RECTAL DAILY
Status: DISCONTINUED | OUTPATIENT
Start: 2021-01-01 | End: 2021-01-01 | Stop reason: HOSPADM

## 2021-01-01 RX ORDER — DOCUSATE SODIUM 100 MG/1
100 CAPSULE, LIQUID FILLED ORAL 2 TIMES DAILY
Status: DISCONTINUED | OUTPATIENT
Start: 2021-01-01 | End: 2021-01-01

## 2021-01-01 RX ORDER — DEXTROSE AND SODIUM CHLORIDE 5; .45 G/100ML; G/100ML
30 INJECTION, SOLUTION INTRAVENOUS CONTINUOUS PRN
Status: DISCONTINUED | OUTPATIENT
Start: 2021-01-01 | End: 2021-01-01 | Stop reason: HOSPADM

## 2021-01-01 RX ORDER — LIDOCAINE 50 MG/G
2 PATCH TOPICAL
Status: DISCONTINUED | OUTPATIENT
Start: 2021-01-01 | End: 2021-01-01

## 2021-01-01 RX ORDER — PANTOPRAZOLE SODIUM 40 MG/1
40 TABLET, DELAYED RELEASE ORAL EVERY MORNING
Status: DISCONTINUED | OUTPATIENT
Start: 2021-01-01 | End: 2021-01-01

## 2021-01-01 RX ORDER — FENTANYL 25 UG/H
1 PATCH TRANSDERMAL
Status: DISCONTINUED | OUTPATIENT
Start: 2021-01-01 | End: 2021-01-01 | Stop reason: HOSPADM

## 2021-01-01 RX ORDER — METOPROLOL SUCCINATE 25 MG/1
25 TABLET, EXTENDED RELEASE ORAL NIGHTLY
Status: DISCONTINUED | OUTPATIENT
Start: 2021-01-01 | End: 2021-01-01

## 2021-01-01 RX ORDER — MORPHINE SULFATE 1 MG/ML
INJECTION INTRAVENOUS CONTINUOUS
Status: DISCONTINUED | OUTPATIENT
Start: 2021-01-01 | End: 2021-01-01 | Stop reason: HOSPADM

## 2021-01-01 RX ORDER — ACETAMINOPHEN 325 MG/1
650 TABLET ORAL EVERY 6 HOURS PRN
Status: DISCONTINUED | OUTPATIENT
Start: 2021-01-01 | End: 2021-01-01 | Stop reason: HOSPADM

## 2021-01-01 RX ORDER — AMLODIPINE BESYLATE 10 MG/1
10 TABLET ORAL
Status: DISCONTINUED | OUTPATIENT
Start: 2021-01-01 | End: 2021-01-01 | Stop reason: HOSPADM

## 2021-01-01 RX ORDER — OXYCODONE HYDROCHLORIDE 5 MG/1
20 TABLET ORAL EVERY 4 HOURS
Status: DISCONTINUED | OUTPATIENT
Start: 2021-01-01 | End: 2021-01-01

## 2021-01-01 RX ORDER — SCOLOPAMINE TRANSDERMAL SYSTEM 1 MG/1
1 PATCH, EXTENDED RELEASE TRANSDERMAL
Status: DISCONTINUED | OUTPATIENT
Start: 2021-01-01 | End: 2021-01-01 | Stop reason: HOSPADM

## 2021-01-01 RX ORDER — ONDANSETRON 2 MG/ML
4 INJECTION INTRAMUSCULAR; INTRAVENOUS ONCE AS NEEDED
Status: DISCONTINUED | OUTPATIENT
Start: 2021-01-01 | End: 2021-01-01 | Stop reason: HOSPADM

## 2021-01-01 RX ORDER — ATROPINE SULFATE 10 MG/ML
2 SOLUTION/ DROPS OPHTHALMIC 4 TIMES DAILY
Status: CANCELLED | OUTPATIENT
Start: 2021-01-01

## 2021-01-01 RX ORDER — ALBUTEROL SULFATE 90 UG/1
2 AEROSOL, METERED RESPIRATORY (INHALATION) EVERY 4 HOURS PRN
Status: DISCONTINUED | OUTPATIENT
Start: 2021-01-01 | End: 2021-01-01

## 2021-01-01 RX ORDER — AMLODIPINE BESYLATE 10 MG/1
10 TABLET ORAL
Qty: 30 TABLET | Refills: 0 | Status: SHIPPED | OUTPATIENT
Start: 2021-01-01

## 2021-01-01 RX ADMIN — ASPIRIN 81 MG: 81 TABLET, FILM COATED ORAL at 12:53

## 2021-01-01 RX ADMIN — HYDRALAZINE HYDROCHLORIDE 10 MG: 20 INJECTION INTRAMUSCULAR; INTRAVENOUS at 19:45

## 2021-01-01 RX ADMIN — SODIUM CHLORIDE 50 ML/HR: 9 INJECTION, SOLUTION INTRAVENOUS at 03:32

## 2021-01-01 RX ADMIN — MORPHINE SULFATE 4 MG: 4 INJECTION INTRAVENOUS at 12:18

## 2021-01-01 RX ADMIN — OXYCODONE 20 MG: 5 TABLET ORAL at 05:45

## 2021-01-01 RX ADMIN — OXYCODONE 20 MG: 5 TABLET ORAL at 08:24

## 2021-01-01 RX ADMIN — AMITRIPTYLINE HYDROCHLORIDE 25 MG: 25 TABLET, FILM COATED ORAL at 20:51

## 2021-01-01 RX ADMIN — SODIUM CHLORIDE, PRESERVATIVE FREE 10 ML: 5 INJECTION INTRAVENOUS at 21:25

## 2021-01-01 RX ADMIN — MORPHINE SULFATE 4 MG: 4 INJECTION INTRAVENOUS at 11:23

## 2021-01-01 RX ADMIN — HYDROCODONE BITARTRATE AND ACETAMINOPHEN 2 TABLET: 5; 325 TABLET ORAL at 06:18

## 2021-01-01 RX ADMIN — METOPROLOL TARTRATE 25 MG: 25 TABLET, FILM COATED ORAL at 21:05

## 2021-01-01 RX ADMIN — OXYCODONE 10 MG: 5 TABLET ORAL at 06:05

## 2021-01-01 RX ADMIN — OXYCODONE HYDROCHLORIDE 10 MG: 10 TABLET, FILM COATED, EXTENDED RELEASE ORAL at 08:24

## 2021-01-01 RX ADMIN — SEVELAMER CARBONATE 800 MG: 800 TABLET, FILM COATED ORAL at 17:28

## 2021-01-01 RX ADMIN — DEXAMETHASONE 20 MG: 4 TABLET ORAL at 12:25

## 2021-01-01 RX ADMIN — MORPHINE SULFATE 4 MG: 4 INJECTION INTRAVENOUS at 04:04

## 2021-01-01 RX ADMIN — MORPHINE SULFATE 2 MG: 2 INJECTION, SOLUTION INTRAMUSCULAR; INTRAVENOUS at 14:21

## 2021-01-01 RX ADMIN — FENTANYL TRANSDERMAL 1 PATCH: 25 PATCH, EXTENDED RELEASE TRANSDERMAL at 11:54

## 2021-01-01 RX ADMIN — HYDRALAZINE HYDROCHLORIDE 25 MG: 25 TABLET, FILM COATED ORAL at 06:05

## 2021-01-01 RX ADMIN — ALLOPURINOL 100 MG: 100 TABLET ORAL at 09:18

## 2021-01-01 RX ADMIN — MORPHINE SULFATE 4 MG: 4 INJECTION INTRAVENOUS at 18:40

## 2021-01-01 RX ADMIN — HYDRALAZINE HYDROCHLORIDE 25 MG: 25 TABLET, FILM COATED ORAL at 05:55

## 2021-01-01 RX ADMIN — AMLODIPINE BESYLATE 10 MG: 10 TABLET ORAL at 12:54

## 2021-01-01 RX ADMIN — ROSUVASTATIN CALCIUM 10 MG: 10 TABLET, FILM COATED ORAL at 21:04

## 2021-01-01 RX ADMIN — AMLODIPINE BESYLATE 10 MG: 10 TABLET ORAL at 22:41

## 2021-01-01 RX ADMIN — MORPHINE SULFATE 4 MG: 4 INJECTION INTRAVENOUS at 04:20

## 2021-01-01 RX ADMIN — HEPARIN SODIUM 5000 UNITS: 5000 INJECTION INTRAVENOUS; SUBCUTANEOUS at 13:02

## 2021-01-01 RX ADMIN — SEVELAMER CARBONATE 2400 MG: 800 TABLET, FILM COATED ORAL at 12:52

## 2021-01-01 RX ADMIN — DEXTROSE AND SODIUM CHLORIDE 30 ML/HR: 5; 450 INJECTION, SOLUTION INTRAVENOUS at 13:30

## 2021-01-01 RX ADMIN — OXYCODONE 30 MG: 5 TABLET ORAL at 03:18

## 2021-01-01 RX ADMIN — HEPARIN SODIUM 5000 UNITS: 5000 INJECTION INTRAVENOUS; SUBCUTANEOUS at 05:59

## 2021-01-01 RX ADMIN — MORPHINE SULFATE 4 MG: 4 INJECTION INTRAVENOUS at 15:25

## 2021-01-01 RX ADMIN — EPOETIN ALFA 14000 UNITS: 10000 SOLUTION INTRAVENOUS; SUBCUTANEOUS at 12:36

## 2021-01-01 RX ADMIN — METOPROLOL SUCCINATE 25 MG: 25 TABLET, FILM COATED, EXTENDED RELEASE ORAL at 21:04

## 2021-01-01 RX ADMIN — MORPHINE SULFATE 4 MG: 4 INJECTION INTRAVENOUS at 22:19

## 2021-01-01 RX ADMIN — MORPHINE SULFATE 1 MG: 2 INJECTION, SOLUTION INTRAMUSCULAR; INTRAVENOUS at 12:22

## 2021-01-01 RX ADMIN — FOLIC ACID 1 MG: 1 TABLET ORAL at 11:27

## 2021-01-01 RX ADMIN — ONDANSETRON HYDROCHLORIDE 4 MG: 4 TABLET, FILM COATED ORAL at 17:27

## 2021-01-01 RX ADMIN — OXYCODONE HYDROCHLORIDE 10 MG: 10 TABLET, FILM COATED, EXTENDED RELEASE ORAL at 13:39

## 2021-01-01 RX ADMIN — SODIUM POLYSTYRENE SULFONATE 15 G: 15 SUSPENSION ORAL; RECTAL at 09:50

## 2021-01-01 RX ADMIN — SEVELAMER CARBONATE 2400 MG: 800 TABLET, FILM COATED ORAL at 09:18

## 2021-01-01 RX ADMIN — MORPHINE SULFATE 4 MG: 4 INJECTION INTRAVENOUS at 05:26

## 2021-01-01 RX ADMIN — HYDRALAZINE HYDROCHLORIDE 25 MG: 25 TABLET, FILM COATED ORAL at 13:35

## 2021-01-01 RX ADMIN — HYDRALAZINE HYDROCHLORIDE 25 MG: 25 TABLET, FILM COATED ORAL at 20:51

## 2021-01-01 RX ADMIN — OXYCODONE 10 MG: 5 TABLET ORAL at 05:53

## 2021-01-01 RX ADMIN — FUROSEMIDE 80 MG: 10 INJECTION, SOLUTION INTRAMUSCULAR; INTRAVENOUS at 12:03

## 2021-01-01 RX ADMIN — ONDANSETRON HYDROCHLORIDE 4 MG: 4 TABLET, FILM COATED ORAL at 12:23

## 2021-01-01 RX ADMIN — OXYCODONE 10 MG: 5 TABLET ORAL at 04:31

## 2021-01-01 RX ADMIN — METOPROLOL SUCCINATE 50 MG: 50 TABLET, EXTENDED RELEASE ORAL at 22:41

## 2021-01-01 RX ADMIN — ONDANSETRON 4 MG: 2 INJECTION INTRAMUSCULAR; INTRAVENOUS at 06:51

## 2021-01-01 RX ADMIN — PROPOFOL 30 MG: 10 INJECTION, EMULSION INTRAVENOUS at 14:12

## 2021-01-01 RX ADMIN — FENTANYL 1 PATCH: 12 PATCH TRANSDERMAL at 22:05

## 2021-01-01 RX ADMIN — MORPHINE SULFATE 4 MG: 4 INJECTION INTRAVENOUS at 20:20

## 2021-01-01 RX ADMIN — ASPIRIN 81 MG: 81 TABLET, FILM COATED ORAL at 11:26

## 2021-01-01 RX ADMIN — CYANOCOBALAMIN TAB 250 MCG 250 MCG: 250 TAB at 11:27

## 2021-01-01 RX ADMIN — HYDRALAZINE HYDROCHLORIDE 25 MG: 25 TABLET, FILM COATED ORAL at 16:46

## 2021-01-01 RX ADMIN — SODIUM CHLORIDE, PRESERVATIVE FREE 10 ML: 5 INJECTION INTRAVENOUS at 03:33

## 2021-01-01 RX ADMIN — OXYCODONE 10 MG: 5 TABLET ORAL at 13:35

## 2021-01-01 RX ADMIN — MORPHINE SULFATE 2 MG: 2 INJECTION, SOLUTION INTRAMUSCULAR; INTRAVENOUS at 07:14

## 2021-01-01 RX ADMIN — PROPOFOL 30 MG: 10 INJECTION, EMULSION INTRAVENOUS at 14:19

## 2021-01-01 RX ADMIN — HYDRALAZINE HYDROCHLORIDE 25 MG: 25 TABLET, FILM COATED ORAL at 21:24

## 2021-01-01 RX ADMIN — LEVOTHYROXINE SODIUM 50 MCG: 50 TABLET ORAL at 09:18

## 2021-01-01 RX ADMIN — SEVELAMER CARBONATE 800 MG: 800 TABLET, FILM COATED ORAL at 11:25

## 2021-01-01 RX ADMIN — OXYCODONE 10 MG: 5 TABLET ORAL at 19:01

## 2021-01-01 RX ADMIN — LIDOCAINE 1 PATCH: 50 PATCH CUTANEOUS at 12:54

## 2021-01-01 RX ADMIN — SODIUM CHLORIDE, PRESERVATIVE FREE 10 ML: 5 INJECTION INTRAVENOUS at 09:08

## 2021-01-01 RX ADMIN — MORPHINE SULFATE 4 MG: 4 INJECTION INTRAVENOUS at 12:22

## 2021-01-01 RX ADMIN — OXYCODONE 20 MG: 5 TABLET ORAL at 22:40

## 2021-01-01 RX ADMIN — BISACODYL 10 MG: 5 TABLET, COATED ORAL at 12:53

## 2021-01-01 RX ADMIN — MORPHINE SULFATE: 1 INJECTION INTRAVENOUS at 16:57

## 2021-01-01 RX ADMIN — MORPHINE SULFATE: 1 INJECTION INTRAVENOUS at 18:48

## 2021-01-01 RX ADMIN — PROMETHAZINE HYDROCHLORIDE 12.5 MG: 25 INJECTION INTRAMUSCULAR; INTRAVENOUS at 06:59

## 2021-01-01 RX ADMIN — PANTOPRAZOLE SODIUM 40 MG: 40 TABLET, DELAYED RELEASE ORAL at 05:54

## 2021-01-01 RX ADMIN — OXYCODONE HYDROCHLORIDE 10 MG: 10 TABLET, FILM COATED, EXTENDED RELEASE ORAL at 22:40

## 2021-01-01 RX ADMIN — OXYCODONE 10 MG: 5 TABLET ORAL at 21:05

## 2021-01-01 RX ADMIN — SODIUM CHLORIDE 125 MG: 9 INJECTION, SOLUTION INTRAVENOUS at 11:26

## 2021-01-01 RX ADMIN — LEVOTHYROXINE SODIUM 50 MCG: 50 TABLET ORAL at 12:57

## 2021-01-01 RX ADMIN — PANTOPRAZOLE SODIUM 40 MG: 40 TABLET, DELAYED RELEASE ORAL at 06:19

## 2021-01-01 RX ADMIN — PANTOPRAZOLE SODIUM 40 MG: 40 TABLET, DELAYED RELEASE ORAL at 05:59

## 2021-01-01 RX ADMIN — FENTANYL TRANSDERMAL 1 PATCH: 25 PATCH, EXTENDED RELEASE TRANSDERMAL at 12:49

## 2021-01-01 RX ADMIN — AMITRIPTYLINE HYDROCHLORIDE 25 MG: 25 TABLET, FILM COATED ORAL at 20:09

## 2021-01-01 RX ADMIN — SODIUM CHLORIDE, PRESERVATIVE FREE 10 ML: 5 INJECTION INTRAVENOUS at 20:10

## 2021-01-01 RX ADMIN — OXYCODONE 10 MG: 5 TABLET ORAL at 10:29

## 2021-01-01 RX ADMIN — SEVELAMER CARBONATE 2400 MG: 800 TABLET, FILM COATED ORAL at 12:25

## 2021-01-01 RX ADMIN — HYDRALAZINE HYDROCHLORIDE 25 MG: 25 TABLET, FILM COATED ORAL at 09:54

## 2021-01-01 RX ADMIN — SODIUM POLYSTYRENE SULFONATE 15 G: 15 SUSPENSION ORAL; RECTAL at 09:18

## 2021-01-01 RX ADMIN — SODIUM CHLORIDE, PRESERVATIVE FREE 10 ML: 5 INJECTION INTRAVENOUS at 09:50

## 2021-01-01 RX ADMIN — SEVELAMER CARBONATE 2400 MG: 800 TABLET, FILM COATED ORAL at 17:28

## 2021-01-01 RX ADMIN — FOLIC ACID 1 MG: 1 TABLET ORAL at 09:18

## 2021-01-01 RX ADMIN — SEVELAMER CARBONATE 2400 MG: 800 TABLET, FILM COATED ORAL at 17:21

## 2021-01-01 RX ADMIN — ALLOPURINOL 100 MG: 100 TABLET ORAL at 12:56

## 2021-01-01 RX ADMIN — SODIUM CHLORIDE, PRESERVATIVE FREE 10 ML: 5 INJECTION INTRAVENOUS at 09:19

## 2021-01-01 RX ADMIN — HYDROCODONE BITARTRATE AND ACETAMINOPHEN 2 TABLET: 5; 325 TABLET ORAL at 16:46

## 2021-01-01 RX ADMIN — HYDRALAZINE HYDROCHLORIDE 25 MG: 25 TABLET, FILM COATED ORAL at 06:12

## 2021-01-01 RX ADMIN — OXYCODONE 10 MG: 5 TABLET ORAL at 06:18

## 2021-01-01 RX ADMIN — MORPHINE SULFATE 4 MG: 4 INJECTION INTRAVENOUS at 23:57

## 2021-01-01 RX ADMIN — OXYCODONE 10 MG: 5 TABLET ORAL at 23:14

## 2021-01-01 RX ADMIN — MORPHINE SULFATE 4 MG: 4 INJECTION INTRAVENOUS at 05:45

## 2021-01-01 RX ADMIN — HEPARIN SODIUM 5000 UNITS: 5000 INJECTION INTRAVENOUS; SUBCUTANEOUS at 21:25

## 2021-01-01 RX ADMIN — OXYCODONE 5 MG: 5 TABLET ORAL at 14:22

## 2021-01-01 RX ADMIN — HYDROCODONE BITARTRATE AND ACETAMINOPHEN 2 TABLET: 5; 325 TABLET ORAL at 14:54

## 2021-01-01 RX ADMIN — HYDRALAZINE HYDROCHLORIDE 25 MG: 25 TABLET, FILM COATED ORAL at 20:10

## 2021-01-01 RX ADMIN — DOCUSATE SODIUM 100 MG: 100 CAPSULE, LIQUID FILLED ORAL at 21:05

## 2021-01-01 RX ADMIN — OXYCODONE 10 MG: 5 TABLET ORAL at 17:21

## 2021-01-01 RX ADMIN — PROPOFOL 30 MG: 10 INJECTION, EMULSION INTRAVENOUS at 14:18

## 2021-01-01 RX ADMIN — MORPHINE SULFATE 4 MG: 4 INJECTION INTRAVENOUS at 07:16

## 2021-01-01 RX ADMIN — HEPARIN SODIUM 5000 UNITS: 5000 INJECTION INTRAVENOUS; SUBCUTANEOUS at 06:19

## 2021-01-01 RX ADMIN — AMITRIPTYLINE HYDROCHLORIDE 25 MG: 25 TABLET, FILM COATED ORAL at 22:41

## 2021-01-01 RX ADMIN — MORPHINE SULFATE 4 MG: 4 INJECTION INTRAVENOUS at 02:17

## 2021-01-01 RX ADMIN — DOCUSATE SODIUM 100 MG: 100 CAPSULE, LIQUID FILLED ORAL at 21:04

## 2021-01-01 RX ADMIN — HYDROCODONE BITARTRATE AND ACETAMINOPHEN 2 TABLET: 5; 325 TABLET ORAL at 21:52

## 2021-01-01 RX ADMIN — HYDRALAZINE HYDROCHLORIDE 25 MG: 25 TABLET, FILM COATED ORAL at 21:04

## 2021-01-01 RX ADMIN — OXYCODONE 20 MG: 5 TABLET ORAL at 15:25

## 2021-01-01 RX ADMIN — SODIUM POLYSTYRENE SULFONATE 15 G: 15 SUSPENSION ORAL; RECTAL at 12:53

## 2021-01-01 RX ADMIN — SEVELAMER CARBONATE 2400 MG: 800 TABLET, FILM COATED ORAL at 19:01

## 2021-01-01 RX ADMIN — HEPARIN SODIUM 5000 UNITS: 5000 INJECTION INTRAVENOUS; SUBCUTANEOUS at 13:35

## 2021-01-01 RX ADMIN — HYDRALAZINE HYDROCHLORIDE 25 MG: 25 TABLET, FILM COATED ORAL at 21:05

## 2021-01-01 RX ADMIN — ALLOPURINOL 100 MG: 100 TABLET ORAL at 11:26

## 2021-01-01 RX ADMIN — AMITRIPTYLINE HYDROCHLORIDE 25 MG: 25 TABLET, FILM COATED ORAL at 21:24

## 2021-01-01 RX ADMIN — DEXAMETHASONE 20 MG: 4 TABLET ORAL at 12:55

## 2021-01-01 RX ADMIN — MORPHINE SULFATE 4 MG: 4 INJECTION INTRAVENOUS at 23:02

## 2021-01-01 RX ADMIN — HYDRALAZINE HYDROCHLORIDE 25 MG: 25 TABLET, FILM COATED ORAL at 06:19

## 2021-01-01 RX ADMIN — HYDRALAZINE HYDROCHLORIDE 25 MG: 25 TABLET, FILM COATED ORAL at 22:41

## 2021-01-01 RX ADMIN — DOCUSATE SODIUM 100 MG: 100 CAPSULE, LIQUID FILLED ORAL at 12:54

## 2021-01-01 RX ADMIN — HYDRALAZINE HYDROCHLORIDE 25 MG: 25 TABLET, FILM COATED ORAL at 05:54

## 2021-01-01 RX ADMIN — PROPOFOL 50 MG: 10 INJECTION, EMULSION INTRAVENOUS at 14:05

## 2021-01-01 RX ADMIN — LEVOTHYROXINE SODIUM 50 MCG: 50 TABLET ORAL at 11:27

## 2021-01-01 RX ADMIN — MORPHINE SULFATE 2 MG: 2 INJECTION, SOLUTION INTRAMUSCULAR; INTRAVENOUS at 12:25

## 2021-01-01 RX ADMIN — MORPHINE SULFATE 1 MG: 2 INJECTION, SOLUTION INTRAMUSCULAR; INTRAVENOUS at 16:09

## 2021-01-01 RX ADMIN — PANTOPRAZOLE SODIUM 40 MG: 40 INJECTION, POWDER, FOR SOLUTION INTRAVENOUS at 05:55

## 2021-01-01 RX ADMIN — MORPHINE SULFATE 4 MG: 4 INJECTION INTRAVENOUS at 11:20

## 2021-01-01 RX ADMIN — SEVELAMER CARBONATE 800 MG: 800 TABLET, FILM COATED ORAL at 18:10

## 2021-01-01 RX ADMIN — BISACODYL 10 MG: 5 TABLET, COATED ORAL at 12:25

## 2021-01-01 RX ADMIN — OXYCODONE 10 MG: 5 TABLET ORAL at 17:28

## 2021-01-01 RX ADMIN — AMLODIPINE BESYLATE 10 MG: 10 TABLET ORAL at 09:18

## 2021-01-01 RX ADMIN — HEPARIN SODIUM 5000 UNITS: 5000 INJECTION INTRAVENOUS; SUBCUTANEOUS at 21:04

## 2021-01-01 RX ADMIN — PANTOPRAZOLE SODIUM 40 MG: 40 INJECTION, POWDER, FOR SOLUTION INTRAVENOUS at 06:12

## 2021-01-01 RX ADMIN — LIDOCAINE 1 PATCH: 50 PATCH CUTANEOUS at 16:04

## 2021-01-01 RX ADMIN — PROPOFOL 20 MG: 10 INJECTION, EMULSION INTRAVENOUS at 14:07

## 2021-01-01 RX ADMIN — ROSUVASTATIN CALCIUM 10 MG: 10 TABLET, FILM COATED ORAL at 21:05

## 2021-01-01 RX ADMIN — HYDROCODONE BITARTRATE AND ACETAMINOPHEN 2 TABLET: 5; 325 TABLET ORAL at 21:59

## 2021-01-01 RX ADMIN — AMLODIPINE BESYLATE 10 MG: 10 TABLET ORAL at 11:27

## 2021-01-01 RX ADMIN — ATROPINE SULFATE 2 DROP: 10 SOLUTION OPHTHALMIC at 17:08

## 2021-01-01 RX ADMIN — ONDANSETRON 4 MG: 2 INJECTION INTRAMUSCULAR; INTRAVENOUS at 04:09

## 2021-01-01 RX ADMIN — MORPHINE SULFATE 2 MG: 2 INJECTION, SOLUTION INTRAMUSCULAR; INTRAVENOUS at 09:20

## 2021-01-01 RX ADMIN — HYDRALAZINE HYDROCHLORIDE 25 MG: 25 TABLET, FILM COATED ORAL at 13:02

## 2021-01-01 RX ADMIN — OXYCODONE HYDROCHLORIDE 20 MG: 20 TABLET, FILM COATED, EXTENDED RELEASE ORAL at 08:15

## 2021-01-01 RX ADMIN — POLYETHYLENE GLYCOL 3350 17 G: 17 POWDER, FOR SOLUTION ORAL at 09:32

## 2021-01-01 RX ADMIN — LIDOCAINE HYDROCHLORIDE 100 MG: 20 INJECTION, SOLUTION EPIDURAL; INFILTRATION; INTRACAUDAL; PERINEURAL at 14:05

## 2021-01-01 RX ADMIN — DOCUSATE SODIUM 100 MG: 100 CAPSULE, LIQUID FILLED ORAL at 09:19

## 2021-01-01 RX ADMIN — HYDROCODONE BITARTRATE AND ACETAMINOPHEN 1 TABLET: 10; 325 TABLET ORAL at 19:42

## 2021-01-01 RX ADMIN — PROPOFOL 50 MG: 10 INJECTION, EMULSION INTRAVENOUS at 14:23

## 2021-01-01 RX ADMIN — ASPIRIN 81 MG: 81 TABLET, FILM COATED ORAL at 09:18

## 2021-01-01 RX ADMIN — MORPHINE SULFATE 2 MG: 2 INJECTION, SOLUTION INTRAMUSCULAR; INTRAVENOUS at 21:03

## 2021-01-01 RX ADMIN — METOPROLOL TARTRATE 25 MG: 25 TABLET, FILM COATED ORAL at 21:24

## 2021-01-01 RX ADMIN — DOCUSATE SODIUM 100 MG: 100 CAPSULE, LIQUID FILLED ORAL at 21:24

## 2021-01-01 RX ADMIN — ROSUVASTATIN CALCIUM 10 MG: 10 TABLET, FILM COATED ORAL at 21:24

## 2021-01-01 RX ADMIN — ONDANSETRON HYDROCHLORIDE 4 MG: 4 TABLET, FILM COATED ORAL at 13:39

## 2021-01-01 RX ADMIN — MORPHINE SULFATE 4 MG: 4 INJECTION, SOLUTION INTRAMUSCULAR; INTRAVENOUS at 21:50

## 2021-01-01 RX ADMIN — OXYCODONE 10 MG: 5 TABLET ORAL at 02:03

## 2021-01-01 RX ADMIN — ONDANSETRON 4 MG: 2 INJECTION INTRAMUSCULAR; INTRAVENOUS at 05:25

## 2021-01-01 RX ADMIN — MORPHINE SULFATE 4 MG: 4 INJECTION INTRAVENOUS at 09:57

## 2021-01-01 RX ADMIN — MORPHINE SULFATE 4 MG: 4 INJECTION INTRAVENOUS at 08:23

## 2021-01-01 RX ADMIN — OXYCODONE 10 MG: 5 TABLET ORAL at 02:52

## 2021-01-01 RX ADMIN — OXYCODONE 10 MG: 5 TABLET ORAL at 20:31

## 2021-01-01 RX ADMIN — OXYCODONE 10 MG: 5 TABLET ORAL at 12:51

## 2021-01-01 RX ADMIN — PROPOFOL 30 MG: 10 INJECTION, EMULSION INTRAVENOUS at 14:15

## 2021-01-01 RX ADMIN — ONDANSETRON 4 MG: 2 INJECTION INTRAMUSCULAR; INTRAVENOUS at 20:36

## 2021-01-01 RX ADMIN — AMITRIPTYLINE HYDROCHLORIDE 25 MG: 25 TABLET, FILM COATED ORAL at 21:04

## 2021-01-01 RX ADMIN — PROPOFOL 20 MG: 10 INJECTION, EMULSION INTRAVENOUS at 14:09

## 2021-01-01 RX ADMIN — HEPARIN SODIUM 5000 UNITS: 5000 INJECTION INTRAVENOUS; SUBCUTANEOUS at 05:53

## 2021-01-01 RX ADMIN — OXYCODONE 10 MG: 5 TABLET ORAL at 20:56

## 2021-01-01 RX ADMIN — HEPARIN SODIUM 5000 UNITS: 5000 INJECTION INTRAVENOUS; SUBCUTANEOUS at 09:58

## 2021-01-01 RX ADMIN — MORPHINE SULFATE 4 MG: 4 INJECTION INTRAVENOUS at 05:53

## 2021-01-01 RX ADMIN — ATROPINE SULFATE 2 DROP: 10 SOLUTION OPHTHALMIC at 11:12

## 2021-01-01 RX ADMIN — FOLIC ACID 1 MG: 1 TABLET ORAL at 12:54

## 2021-01-01 RX ADMIN — POLYETHYLENE GLYCOL 3350 17 G: 17 POWDER, FOR SOLUTION ORAL at 12:53

## 2021-01-01 RX ADMIN — SODIUM CHLORIDE, PRESERVATIVE FREE 10 ML: 5 INJECTION INTRAVENOUS at 12:58

## 2021-01-01 RX ADMIN — SCOLOPAMINE TRANSDERMAL SYSTEM 1 PATCH: 1 PATCH, EXTENDED RELEASE TRANSDERMAL at 11:11

## 2021-01-01 RX ADMIN — SEVELAMER CARBONATE 2400 MG: 800 TABLET, FILM COATED ORAL at 09:49

## 2021-01-01 RX ADMIN — SODIUM CHLORIDE, PRESERVATIVE FREE 10 ML: 5 INJECTION INTRAVENOUS at 21:06

## 2021-01-01 RX ADMIN — HEPARIN SODIUM 5000 UNITS: 5000 INJECTION INTRAVENOUS; SUBCUTANEOUS at 21:05

## 2021-01-01 RX ADMIN — AMITRIPTYLINE HYDROCHLORIDE 25 MG: 25 TABLET, FILM COATED ORAL at 21:05

## 2021-06-08 PROBLEM — M89.9: Status: ACTIVE | Noted: 2021-01-01

## 2021-06-08 PROBLEM — D64.9 ANEMIA: Status: ACTIVE | Noted: 2021-01-01

## 2021-06-08 NOTE — PROGRESS NOTES
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 for further evaluation and recommendation regarding abnormal CT scan of chest showing diffuse bony abnormality showing sclerotic and lytic lesion along with anemia and hypercalcemia.  Patient states he had a CT scan done at [facility] on June 1, 2021 for evaluation of left-sided rib cage pain that started 2 weeks prior to CT scan when he went to chiropractor for manipulation.  Denies any major weight loss or any lymph node enlargement.  Denies any personal history of malignancy.  Denies any blood in stool or urine.        PAST MEDICAL HISTORY:    Past Medical History:   Diagnosis Date   • Abdominal aortic aneurysm (CMS/HCC)    • Allergic rhinitis    • Arthritis    • Carotid artery stenosis    • Degenerative joint disease involving multiple joints    • Disease of thyroid gland    • Diverticulitis    • Dyslipidemia    • Eustachian tube disorder    • History of renal dialysis    • Hypertension    • Ingrown toenail    • Kidney stone    • Malignant tumor of Meckel's diverticulum (CMS/HCC)    • Myopic astigmatism    • Rheumatic fever        PAST SURGICAL HISTORY:  Past Surgical History:   Procedure Laterality Date   • ABDOMINAL AORTIC ANEURYSM REPAIR  08/2016    2 repairs done at same time in [place]   • ARTERIOVENOUS FISTULA Left 07/05/2017    fistula placed in left forearm   •  ARTERIOVENOUS FISTULA/SHUNT SURGERY Left 6/17/2020    Procedure: revision LEFT ARTERIOVENOUS FISTULA AND SHUNT branch ligation;  Surgeon: Michael Shields MD;  Location: [facility] OR;  Service: Vascular;  Laterality: Left;   • INJECTION OF MEDICATION      Kenalog (4)    • INTERVENTIONAL RADIOLOGY PROCEDURE N/A 8/23/2017    Procedure: tunneled central venous catheter placement;  Surgeon: Michael Shields MD;  Location: [facility] LOCATION;  Service:    • INTERVENTIONAL RADIOLOGY PROCEDURE Left 6/5/2020    Procedure: LEFT dialysis fistulagram possible angioplasty stent tunnel catheter;  Surgeon: Michael Shields MD;  Location: [facility] LOCATION;  Service: Interventional Radiology;  Laterality: Left;   • INTERVENTIONAL RADIOLOGY PROCEDURE Left 8/27/2020    Procedure: IR dialysis fistulagram;  Surgeon: Michael Shields MD;  Location: [facility] LOCATION;  Service: Interventional Radiology;  Laterality: Left;   • TESTICLE TORSION REPAIR N/A 1964    Pt not sure which side       ALLERGIES:    Allergies   Allergen Reactions   • Dilaudid [Hydromorphone Hcl] Mental Status Change   • Morphine Nausea And Vomiting   • Tape Rash     Just plastic tape       SOCIAL HISTORY:   Social History     Tobacco Use   • Smoking status: Never Smoker   • Smokeless tobacco: Never Used   Substance Use Topics   • Alcohol use: No   • Drug use: No       CURRENT MEDICATIONS:    Current Outpatient Medications   Medication Sig Dispense Refill   • acetaminophen (TYLENOL) 650 MG 8 hr tablet Take 650 mg by mouth Every 8 (Eight) Hours As Needed for Mild Pain .     • allopurinol (ZYLOPRIM) 100 MG tablet Take 100 mg by mouth Daily.     • amitriptyline (ELAVIL) 25 MG tablet Take 25 mg by mouth Every Night.     • aspirin 81 MG EC tablet Take 81 mg by mouth Daily.     • Calcium Acetate 667 MG tablet Take 667 mg by mouth 3 (Three) Times a Day With Meals.     • CO ENZYME Q-10 PO Take 100 mg by mouth  Daily.     • fluticasone (FLONASE) 50 MCG/ACT nasal spray 1 spray into the nostril(s) as directed by provider Daily As Needed.     • HYDROcodone-acetaminophen (NORCO) 5-325 MG per tablet Take 1 tablet by mouth Every 4 (Four) Hours As Needed (Pain). (Patient taking differently: Take 2 tablets by mouth Every 4 (Four) Hours As Needed (Pain).) 30 tablet 0   • levothyroxine (SYNTHROID, LEVOTHROID) 50 MCG tablet Take 50 mcg by mouth Daily.     • lidocaine-prilocaine (EMLA) 2.5-2.5 % cream APPLY SMALL AMOUNT TO ACCESS SITE (AVF) 1 TO 2 HOURS BEFORE DIALYSIS. COVER WITH OCCLUSIVE DRESSING (SARAN WRAP)     • Loratadine (Claritin) 10 MG capsule Take 10 mg by mouth Daily As Needed.     • metoprolol succinate XL (TOPROL-XL) 25 MG 24 hr tablet Take 25 mg by mouth.     • metoprolol tartrate (LOPRESSOR) 6.25 MG quarter tablet Take 25 mg by mouth Daily.     • Omega-3 Fatty Acids (FISH OIL) 1000 MG capsule capsule Take 1,200 mg by mouth Every Night.     • omeprazole (priLOSEC) 20 MG capsule Take 20 mg by mouth Daily.     • rosuvastatin (CRESTOR) 10 MG tablet Take 10 mg by mouth Every Night.     • sevelamer (RENVELA) 800 MG tablet Take 800 mg by mouth 3 (Three) Times a Day With Meals.     • vitamin B-12 (CYANOCOBALAMIN) 250 MCG tablet Take 250 mcg by mouth Daily.       No current facility-administered medications for this visit.        HOME MEDICATIONS:   Current Outpatient Medications on File Prior to Visit   Medication Sig Dispense Refill   • acetaminophen (TYLENOL) 650 MG 8 hr tablet Take 650 mg by mouth Every 8 (Eight) Hours As Needed for Mild Pain .     • allopurinol (ZYLOPRIM) 100 MG tablet Take 100 mg by mouth Daily.     • amitriptyline (ELAVIL) 25 MG tablet Take 25 mg by mouth Every Night.     • aspirin 81 MG EC tablet Take 81 mg by mouth Daily.     • Calcium Acetate 667 MG tablet Take 667 mg by mouth 3 (Three) Times a Day With Meals.     • CO ENZYME Q-10 PO Take 100 mg by mouth Daily.     • fluticasone (FLONASE) 50 MCG/ACT  nasal spray 1 spray into the nostril(s) as directed by provider Daily As Needed.     • HYDROcodone-acetaminophen (NORCO) 5-325 MG per tablet Take 1 tablet by mouth Every 4 (Four) Hours As Needed (Pain). (Patient taking differently: Take 2 tablets by mouth Every 4 (Four) Hours As Needed (Pain).) 30 tablet 0   • levothyroxine (SYNTHROID, LEVOTHROID) 50 MCG tablet Take 50 mcg by mouth Daily.     • lidocaine-prilocaine (EMLA) 2.5-2.5 % cream APPLY SMALL AMOUNT TO ACCESS SITE (AVF) 1 TO 2 HOURS BEFORE DIALYSIS. COVER WITH OCCLUSIVE DRESSING (SARAN WRAP)     • Loratadine (Claritin) 10 MG capsule Take 10 mg by mouth Daily As Needed.     • metoprolol succinate XL (TOPROL-XL) 25 MG 24 hr tablet Take 25 mg by mouth.     • metoprolol tartrate (LOPRESSOR) 6.25 MG quarter tablet Take 25 mg by mouth Daily.     • Omega-3 Fatty Acids (FISH OIL) 1000 MG capsule capsule Take 1,200 mg by mouth Every Night.     • omeprazole (priLOSEC) 20 MG capsule Take 20 mg by mouth Daily.     • rosuvastatin (CRESTOR) 10 MG tablet Take 10 mg by mouth Every Night.     • sevelamer (RENVELA) 800 MG tablet Take 800 mg by mouth 3 (Three) Times a Day With Meals.     • vitamin B-12 (CYANOCOBALAMIN) 250 MCG tablet Take 250 mcg by mouth Daily.       No current facility-administered medications on file prior to visit.       FAMILY HISTORY:    Family History   Problem Relation Age of Onset   • Heart failure Father    • Diabetes Other    • Hypertension Other        REVIEW OF SYSTEMS:        CONSTITUTIONAL:  Complains of fatigue. Denies any fever, chills or weight loss.     EYES: No visual disturbances. No discharge. No new lesions    ENMT:  No epistaxis, mouth sores or difficulty swallowing.    RESPIRATORY: Positive for chronic shortness of breath with exertion. No new cough or hemoptysis.    CARDIOVASCULAR:  No chest pain or palpitations.    GASTROINTESTINAL:  No abdominal pain nausea, vomiting or blood in the stool.    GENITOURINARY: No Dysuria or  "Hematuria.    MUSCULOSKELETAL: Positive for chronic arthritis pain.  Complains of left-sided rib cage pain and discomfort that started after chiropractic manipulation about 4 weeks ago.    LYMPHATICS:  Denies any abnormal swollen glands anywhere in the body.    NEUROLOGICAL : Complains of chronic tingling affecting bilateral feet. No headache or dizziness. No seizures or balance problems.    SKIN: No new skin lesions.    ENDOCRINE : No new hot or cold intolerance. No new polyuria . No polydipsia.        PHYSICAL EXAMINATION:      VITAL SIGNS:  /78   Pulse 95   Temp 97.8 °F (36.6 °C)   Resp 18   Ht 180.3 cm (71\")   Wt 112 kg (245 lb 14.4 oz)   BMI 34.30 kg/m²       06/08/21  1447   Weight: 112 kg (245 lb 14.4 oz)       ECOG performance status: 2    CONSTITUTIONAL:  Not in any distress.    EYES: Mild conjunctival Pallor. No Icterus. No Pterygium. Extraocular Movements intact.No ptosis.    ENMT:  Normocephalic, Atraumatic.No Facial Asymmetry noted.    NECK:  No adenopathy.Trachea midline. NO JVD.    RESPIRATORY:  Fair air entry bilateral. No rhonchi or wheezing.Fair respiratory effort.    CARDIOVASCULAR:  S1, S2. Regular rate and rhythm. No murmur or gallop appreciated.    ABDOMEN:  Soft, obese, nontender. Bowel sounds present in all four quadrants.  No Hepatosplenomegaly appreciated.    MUSCULOSKELETAL:  Trace edema.No Calf Tenderness.Decreased range of motion.  No evidence of any erythema or skin lesion or any mass at the site of pain on left rib cage region.  AV fistula present on left forearm    NEUROLOGIC:    No  Motor deficit appreciated. Cranial Nerves 2-12 grossly intact.    SKIN : No new skin lesion identified. Skin is warm and dry to touch.    LYMPHATICS: No new enlarged lymph nodes in neck or supraclavicular area.    PSYCHIATRY: Alert, awake and oriented ×3.Normal affect.  Normal judgment.  Makes good eye contact.          DIAGNOSTIC DATA:    WBC   Date Value Ref Range Status   06/08/2021 5.62 " 3.40 - 10.80 10*3/mm3 Final   02/15/2019 6.3 4.0 - 11.0 10*3/uL Final     RBC   Date Value Ref Range Status   06/08/2021 2.60 (L) 4.14 - 5.80 10*6/mm3 Final   02/15/2019 3.44 (L) 4.73 - 5.49 10*6/uL Final     Hemoglobin   Date Value Ref Range Status   06/08/2021 8.7 (L) 13.0 - 17.7 g/dL Final   02/15/2019 11.8 (L) 14.4 - 16.6 g/dL Final     Hematocrit   Date Value Ref Range Status   06/08/2021 27.0 (L) 37.5 - 51.0 % Final   02/15/2019 36.1 (L) 42.9 - 49.1 % Final     MCV   Date Value Ref Range Status   06/08/2021 103.8 (H) 79.0 - 97.0 fL Final   02/15/2019 105 (H) 85 - 95 fl Final     MCH   Date Value Ref Range Status   06/08/2021 33.5 (H) 26.6 - 33.0 pg Final   02/15/2019 34.3 (H) 28.0 - 32.0 pg Final     MCHC   Date Value Ref Range Status   06/08/2021 32.2 31.5 - 35.7 g/dL Final   02/15/2019 32.7 32.0 - 34.0 g/dL Final     RDW   Date Value Ref Range Status   06/08/2021 16.9 (H) 12.3 - 15.4 % Final   02/15/2019 53.7 37 - 54 fl Final     RDW-SD   Date Value Ref Range Status   06/08/2021 63.6 (H) 37.0 - 54.0 fl Final     MPV   Date Value Ref Range Status   06/08/2021 9.8 6.0 - 12.0 fL Final   02/15/2019 10.7 8.0 - 12.0 fl Final     Platelets   Date Value Ref Range Status   06/08/2021 259 140 - 450 10*3/mm3 Final   02/15/2019 242 150 - 450 10*3/uL Final     Neutrophil %   Date Value Ref Range Status   06/08/2021 69.5 42.7 - 76.0 % Final     Lymphocyte Rel %   Date Value Ref Range Status   02/15/2019 28.2 5 - 55 % Final     Lymphocyte %   Date Value Ref Range Status   06/08/2021 14.8 (L) 19.6 - 45.3 % Final     Monocyte Rel %   Date Value Ref Range Status   02/15/2019 8.3 (H) 3 - 8 % Final     Monocyte %   Date Value Ref Range Status   06/08/2021 10.5 5.0 - 12.0 % Final     Eosinophil Rel %   Date Value Ref Range Status   02/15/2019 4.5 0 - 5 % Final     Eosinophil %   Date Value Ref Range Status   06/08/2021 3.4 0.3 - 6.2 % Final     Basophil Rel %   Date Value Ref Range Status   02/15/2019 1 0 - 2 % Final      Basophil %   Date Value Ref Range Status   06/08/2021 0.9 0.0 - 1.5 % Final     Immature Grans %   Date Value Ref Range Status   06/08/2021 0.9 (H) 0.0 - 0.5 % Final   02/15/2019 57.7 45 - 80 % Final     Neutrophils, Absolute   Date Value Ref Range Status   06/08/2021 3.91 1.70 - 7.00 10*3/mm3 Final     Lymphocytes, Absolute   Date Value Ref Range Status   06/08/2021 0.83 0.70 - 3.10 10*3/mm3 Final     Monocytes, Absolute   Date Value Ref Range Status   06/08/2021 0.59 0.10 - 0.90 10*3/mm3 Final     Eosinophils, Absolute   Date Value Ref Range Status   06/08/2021 0.19 0.00 - 0.40 10*3/mm3 Final     Basophils, Absolute   Date Value Ref Range Status   06/08/2021 0.05 0.00 - 0.20 10*3/mm3 Final     Immature Grans, Absolute   Date Value Ref Range Status   06/08/2021 0.05 0.00 - 0.05 10*3/mm3 Final     nRBC   Date Value Ref Range Status   06/08/2021 0.0 0.0 - 0.2 /100 WBC Final     Glucose   Date Value Ref Range Status   06/08/2021 136 (H) 65 - 99 mg/dL Final     Sodium   Date Value Ref Range Status   06/08/2021 134 (L) 136 - 145 mmol/L Final     Potassium   Date Value Ref Range Status   06/08/2021 4.3 3.5 - 5.2 mmol/L Final     CO2   Date Value Ref Range Status   06/08/2021 35.0 (H) 22.0 - 29.0 mmol/L Final     Chloride   Date Value Ref Range Status   06/08/2021 95 (L) 98 - 107 mmol/L Final     Anion Gap   Date Value Ref Range Status   06/08/2021 4.0 (L) 5.0 - 15.0 mmol/L Final     Creatinine   Date Value Ref Range Status   06/08/2021 3.91 (H) 0.76 - 1.27 mg/dL Final     BUN   Date Value Ref Range Status   06/08/2021 30 (H) 8 - 23 mg/dL Final     BUN/Creatinine Ratio   Date Value Ref Range Status   06/08/2021 7.7 7.0 - 25.0 Final     Calcium   Date Value Ref Range Status   06/08/2021 10.2 8.6 - 10.5 mg/dL Final     eGFR Non  Amer   Date Value Ref Range Status   06/08/2021 15 (L) >60 mL/min/1.73 Final     Alkaline Phosphatase   Date Value Ref Range Status   06/08/2021 85 39 - 117 U/L Final     Total Protein    Date Value Ref Range Status   06/08/2021 9.0 (H) 6.0 - 8.5 g/dL Final     ALT (SGPT)   Date Value Ref Range Status   06/08/2021 12 1 - 41 U/L Final     AST (SGOT)   Date Value Ref Range Status   06/08/2021 14 1 - 40 U/L Final     Total Bilirubin   Date Value Ref Range Status   06/08/2021 0.4 0.0 - 1.2 mg/dL Final     Albumin   Date Value Ref Range Status   06/08/2021 4.10 3.50 - 5.20 g/dL Final     Globulin   Date Value Ref Range Status   06/08/2021 4.9 gm/dL Final     Lab Results   Component Value Date    IRON 63 06/08/2021    TIBC 347 06/08/2021    LABIRON 18 (L) 06/08/2021    FERRITIN 497.20 (H) 06/08/2021    [id number] 805 03/01/2017    FOLATE >20.00 03/01/2017     No results found for: , LABCA2, AFPTM, HCGQUANT, , CHROMGRNA, [id number], CEA, REFLABREPO]    Radiology Data :  CT of chest without contrast done on June 1, 2021 showed:    CT, Chest without IV contrast:  The bones are diffusely abnormal with extensive mixed lytic and sclerotic lesions.  No lung nodules are seen.  The central airways are widely patent.  No hilar or mediastinal adenopathy or mass.  No pleural fluid or   pericardial fluid.  The visualized liver and adrenal glands are normal.     IMPRESSION:       1.  Carcinomatosis of the bony skeleton.     2.  Otherwise negative chest.     3.  Please correlate with PSA and lab evaluation for multiple myeloma.     4.  If PSA and lab values are negative, CT of the abdomen and pelvis with IV contrast is recommended.           No radiology results for the last 7 days    Pathology :  * Cannot find OR log *        ASSESSMENT AND PLAN:      1.  Abnormal bone appearance on CT scan of chest:  -Upon review of CT scan with radiologist Dr. Shirley and Dr. Maravilla, there is a diffuse demineralization of bone including ribs, sternum and spine.  -Differential diagnosis for this kind of diffuse demineralization with sclerosis and intermittent lytic lesion includes renal osteodystrophy versus metastatic  disease.  -Patient does not have any history of malignancy.  Patient does not have any B symptoms like any weight loss or any drenching night sweats or any new lymph node enlargement.  -We will do blood work today consisting of CBC with differential, iron studies, ferritin, B12 folate and CMP.  -Due to his persistent hypercalcemia on blood work, we will also do myeloma work-up with serum protein electrophoresis with immunofixation, free light chain ratio as well as PSA testing.  -We will ask patient to return to clinic in 1 week to go over the result of blood work and further recommendation.  -If patient has any abnormal myeloma work-up will need bone marrow biopsy for further evaluation    2.  Anemia:  -Case discussed with Dr. Merrill.  This patient is having rapid worsening anemia despite of being on Mircera.  -Anemia work-up done earlier today shows iron saturation is low borderline low at 18%.  For Mircera to work properly iron saturation needs to be greater than 20%.  Will discuss with Dr. Merrill whether they can do his iron infusion with hemodialysis.  If that is not possible will do intravenous iron infusion here at [facility]  -Folate level is borderline at 5.  We will start patient on folic acid 1 mg p.o. daily.  Prescription for folic acid has been sent to his pharmacy today.        3.  Hypertension    4.  Dyslipidemia    5.  Health maintenance: Patient does not smoke    6. Advance Care Planning: For now patient remains full code and is able to make decisions.  Patient has health care surrogate mentioned on chart.        PHQ-9 Total Score: 0   Advance Care Planning: For now patient remains full code and is able to make decisions.  Patient has health care surrogate mentioned on chart.      Jesús Schaeffere Norman reports a pain score of 8.  Given his pain assessment as noted, treatment options were discussed and the following options were decided upon as a follow-up plan to address the patient's pain:  continuation of current treatment plan for pain.             Thank you for this consultation.    Vern Cartwright MD  6/8/2021  16:35 CDT        Part of this note may be an electronic transcription/translation of spoken language to printed text using the Dragon Dictation System.

## 2021-06-09 NOTE — TELEPHONE ENCOUNTER
Called and spoke with pt's wife regarding lab results and instructions for taking meds per Dr. Cartwright, v/u obtained.

## 2021-06-09 NOTE — TELEPHONE ENCOUNTER
----- Message from Vern Cartwright MD sent at 6/9/2021  6:50 AM CDT -----  Please let patient know, his folic acid is borderline at 5.9.  I have sent prescription for folic acid to his pharmacy.  Thank you

## 2021-06-10 NOTE — PROGRESS NOTES
Oncology SW met face to face with patient int he exam room subsequent to his consult.  70 year old male from [place] presents for further evaluation to rule out cancer diagnosis subsequent to recent  abnormal imaging.  Pt with complex medical history to include end stage renal disease and ongoing dialysis.  Pt. Is accompanied in the room by his wife, Ana, who presents attentive and supportive and receptive to SW visit.    LCSW introduced self and explained role and support to include scope of clinical practice in oncology setting.  Pt. Was encouraged to share experiences, thoughts and feelings and he and spouse state feelings of relief as pt feedback from visit is very promising to rule out cancer diagnosis.  Pt. Is planned to undergo further labs and will return in one week for results.    Pt./spouse describe emotions that are consistent with the situation of uncertainty and fear of cancer diagnosis.  No barriers to proceeding with the plan to date reported or observed.  Pt. PHQ9 revived with pt scoring zero.   Pt. Indicates a positive support system and history of adaptive coping.  Ongoing support of undersigned reinforced and contact info provided.  Both verbalize understanding of role of SW and hematology/oncology team.

## 2021-06-14 NOTE — PATIENT INSTRUCTIONS
Patent LEFT Fistula  Increased velocities [id number] vessel  Continue dialysis as scheduled. If problems should arise including difficulty with access, high pressure alarms, or inability to complete dialysis please notify [facility] immediately for evaluation.   Return 3 mos-Duplex

## 2021-06-14 NOTE — PROGRESS NOTES
CVTS Office Progress Note       Subjective   Patient ID: Jesús Austin is a 70 y.o. male is here today for follow-up.    Chief Complaint   Patient presents with   • Hemodialysis Access       The following portions of the patient's history were reviewed and updated as appropriate: allergies, current medications, past family history, past medical history, past social history, past surgical history and problem list.  Recent images independently reviewed.  Available laboratory values reviewed.    PCP:  Luis Miguel Orourke MD  Nephrology: Dosani    70 y.o. male with HTN(uncontrolled, increased risk stroke), AAA(stable, increased risk rupture) EndoRepair [facility] (2016), ESRD(stable, hemodialysis, increased risk cardiovascular events), Carotid Stenosis(stable, increased risk stroke).  never smoked.  Started dialysis 8/2017 via tunnel cath.  Immature radial AVF.  Now functional, no problems at dialysis.  No other associated signs, symptoms or modifying factors.     7/5/2017 LEFT radial artery to cephalic vein forearm AV fistula([place])  8/2017 placement tunneled dialysis catheter   8/2017 ECG:  NSR 70, QTc 451, RBBB   6/5/2020: LEFT Fistulogram   6/17/2020: LEFT AV Fistula Revision Branch Ligation   7/1/2020: LEFTFistula Duplex: mPSV 685cm/s.  Size 3.5 -14mm. Flows 466 -2686ml/m   8/10/2020 LEFTFistula Duplex: mPSV 730cm/s.  Size 2.0 -14mm. Flows 698 -3830ml/m   8/27/2020: LEFT AV Fistula PTA   12/4/2020 LEFTFistula Duplex: mPSV 599cm/s.  Size 2.6 -7.4mm. Flows 288 -1534ml/m   6/14/21:  LEFTFistula Duplex: mPSV 665cm/s.  Size 5.4 -15mm. Flows 845 -8325ml/m      EndoRepair [facility] (2016)  11/20/2020: CT Abd/Pelvis Contrast: No endoleak. [id number]. (R) Iliac 27mm (L) iliac 21mm    Past Medical History:   Diagnosis Date   • Abdominal aortic aneurysm (CMS/HCC)    • Allergic rhinitis    • Arthritis    • Carotid artery stenosis    • Degenerative joint disease involving multiple joints    • Disease of thyroid gland    • Diverticulitis       • Dyslipidemia    • Eustachian tube disorder    • History of renal dialysis    • Hypertension    • Ingrown toenail    • Kidney stone    • Malignant tumor of Meckel's diverticulum (CMS/HCC)    • Myopic astigmatism    • Rheumatic fever      Past Surgical History:   Procedure Laterality Date   • ABDOMINAL AORTIC ANEURYSM REPAIR  08/2016    2 repairs done at same time in [place]   • ARTERIOVENOUS FISTULA Left 07/05/2017    fistula placed in left forearm   • ARTERIOVENOUS FISTULA/SHUNT SURGERY Left 6/17/2020    Procedure: revision LEFT ARTERIOVENOUS FISTULA AND SHUNT branch ligation;  Surgeon: Michael Shields MD;  Location: [facility] OR;  Service: Vascular;  Laterality: Left;   • INJECTION OF MEDICATION      Kenalog (4)    • INTERVENTIONAL RADIOLOGY PROCEDURE N/A 8/23/2017    Procedure: tunneled central venous catheter placement;  Surgeon: Michael Shields MD;  Location: [facility] ANGIO INVASIVE LOCATION;  Service:    • INTERVENTIONAL RADIOLOGY PROCEDURE Left 6/5/2020    Procedure: LEFT dialysis fistulagram possible angioplasty stent tunnel catheter;  Surgeon: Michael Shields MD;  Location: [facility] ANGIO INVASIVE LOCATION;  Service: Interventional Radiology;  Laterality: Left;   • INTERVENTIONAL RADIOLOGY PROCEDURE Left 8/27/2020    Procedure: IR dialysis fistulagram;  Surgeon: Michael Shields MD;  Location: [facility] ANGIO INVASIVE LOCATION;  Service: Interventional Radiology;  Laterality: Left;   • TESTICLE TORSION REPAIR N/A 1964    Pt not sure which side     Family History   Problem Relation Age of Onset   • Heart failure Father    • Diabetes Other    • Hypertension Other      Social History     Tobacco Use   • Smoking status: Never Smoker   • Smokeless tobacco: Never Used   Substance Use Topics   • Alcohol use: No   • Drug use: No       ALLERGIES:   Dilaudid [hydromorphone hcl], Morphine, and Tape    MEDICATIONS:      Current Outpatient Medications:   •  acetaminophen (TYLENOL) 650 MG 8 hr  tablet, Take 650 mg by mouth Every 8 (Eight) Hours As Needed for Mild Pain ., Disp: , Rfl:   •  allopurinol (ZYLOPRIM) 100 MG tablet, Take 100 mg by mouth Daily., Disp: , Rfl:   •  amitriptyline (ELAVIL) 25 MG tablet, Take 25 mg by mouth Every Night., Disp: , Rfl:   •  aspirin 81 MG EC tablet, Take 81 mg by mouth Daily., Disp: , Rfl:   •  CO ENZYME Q-10 PO, Take 100 mg by mouth Daily., Disp: , Rfl:   •  fluticasone (FLONASE) 50 MCG/ACT nasal spray, 1 spray into the nostril(s) as directed by provider Daily As Needed., Disp: , Rfl:   •  folic acid (FOLVITE) 1 MG tablet, Take 1 tablet by mouth Daily., Disp: 90 tablet, Rfl: 1  •  HYDROcodone-acetaminophen (NORCO) 5-325 MG per tablet, Take 1 tablet by mouth Every 4 (Four) Hours As Needed (Pain). (Patient taking differently: Take 2 tablets by mouth Every 4 (Four) Hours As Needed (Pain).), Disp: 30 tablet, Rfl: 0  •  levothyroxine (SYNTHROID, LEVOTHROID) 50 MCG tablet, Take 50 mcg by mouth Daily., Disp: , Rfl:   •  lidocaine-prilocaine (EMLA) 2.5-2.5 % cream, APPLY SMALL AMOUNT TO ACCESS SITE (AVF) 1 TO 2 HOURS BEFORE DIALYSIS. COVER WITH OCCLUSIVE DRESSING (SARAN WRAP), Disp: , Rfl:   •  Loratadine (Claritin) 10 MG capsule, Take 10 mg by mouth Daily As Needed., Disp: , Rfl:   •  metoprolol succinate XL (TOPROL-XL) 25 MG 24 hr tablet, Take 25 mg by mouth., Disp: , Rfl:   •  metoprolol tartrate (LOPRESSOR) 6.25 MG quarter tablet, Take 25 mg by mouth Daily., Disp: , Rfl:   •  Omega-3 Fatty Acids (FISH OIL) 1000 MG capsule capsule, Take 1,200 mg by mouth Every Night., Disp: , Rfl:   •  omeprazole (priLOSEC) 20 MG capsule, Take 20 mg by mouth Daily., Disp: , Rfl:   •  rosuvastatin (CRESTOR) 10 MG tablet, Take 10 mg by mouth Every Night., Disp: , Rfl:   •  sevelamer (RENVELA) 800 MG tablet, Take 800 mg by mouth 3 (Three) Times a Day With Meals., Disp: , Rfl:   •  vitamin B-12 (CYANOCOBALAMIN) 250 MCG tablet, Take 250 mcg by mouth Daily., Disp: , Rfl:     Review of Systems    "  Constitutional: Positive for malaise/fatigue.   Hematologic/Lymphatic: Negative for bleeding problem.   Skin: Negative for color change and nail changes.   Musculoskeletal: Positive for arthritis, back pain and joint pain.   Neurological: Negative for numbness and paresthesias.   All other systems reviewed and are negative.       Objective   Vitals:    06/14/21 1337   BP: 177/82   BP Location: Right arm   Patient Position: Sitting   Cuff Size: Adult   Pulse: 90   Temp: 98.1 °F (36.7 °C)   TempSrc: Temporal   SpO2: 99%   Weight: 115 kg (254 lb)   Height: 180.3 cm (71\")     Body mass index is 35.43 kg/m².  Physical Exam   Constitutional: He is oriented to person, place, and time.   HENT:   Head: Atraumatic.   Cardiovascular: Normal rate.   Pulses:       Radial pulses are 2+ on the right side and 2+ on the left side.        Dorsalis pedis pulses are 2+ on the right side and 2+ on the left side.        Posterior tibial pulses are 2+ on the right side and 2+ on the left side.   (L) Fistula : +thrill/bruit. Aneurysmal/thin mid-fistula   Pulmonary/Chest: Effort normal and breath sounds normal.   Abdominal: Soft. Bowel sounds are normal.   Musculoskeletal: Normal range of motion.   Neurological: He is alert and oriented to person, place, and time. Gait normal.   Skin: Skin is warm and dry. Capillary refill takes less than 2 seconds.   Psychiatric: Thought content normal.   Vitals reviewed.        Assessment & Plan     Independent Review of Radiographic Studies:  Detailed discussion regarding risks, benefits, and treatment plan. Images independently reviewed. Patient understands, agrees, and wishes to proceed with plan.     1. A-V fistula (CMS/HCC)  Patent LEFT Fistula  Increased velocities sag-[id number]-[id number] vessel  Continue dialysis as scheduled. If problems should arise including difficulty with access, high pressure alarms, or inability to complete dialysis please notify [facility] immediately for " evaluation.   Return 3 mos-Duplex  - Duplex Hemodialysis Access CAR; Future    2. ESRD (end stage renal disease) on dialysis (CMS/[facility])  T,Th,Sa    3. Abdominal aortic aneurysm (AAA) without rupture (CMS/HCC)  Stable Endo AAA  No endoleak noted  If symptoms of uncontrolled abdominal pain or sudden onset of abdominal, back pain occur present to the nearest Emergency Department for evaluation.  U/S Aorta 11/2021    4. Essential hypertension  Controlled.     Time spent 30 minutes in face to face evaluation, reviewing of medical history, tests, and procedures. Independent interpretation of vascular studies, ordering additional tests, documentation, and coordination of care.   Counseling and education with the patient and family regarding treatment options, plan of care, and hoped for outcomes. All questions answered.     Patient's Body mass index is 35.43 kg/m². indicating that he is obese (BMI >30). Obesity-related health conditions include the following: hypertension, dyslipidemias and peripheral vascular disease. Obesity is unchanged. BMI is is above average; BMI management plan is completed. We discussed low calorie, low carb based diet program and portion control..       Advance Care Planning discussed and  Informational packet given to patient. Advance Care Plan on file: No             This document has been electronically signed by DELORIS Mcgovern on June 14, 2021 13:43 CDT

## 2021-06-16 PROBLEM — D64.9 ANEMIA: Chronic | Status: ACTIVE | Noted: 2021-01-01

## 2021-06-16 PROBLEM — M89.9: Chronic | Status: ACTIVE | Noted: 2021-01-01

## 2021-06-16 NOTE — PROGRESS NOTES
DATE OF VISIT: 6/16/2021      REASON FOR VISIT: Abnormal imaging of bone showing sclerosis and lytic lesion, anemia, hypercalcemia, end-stage renal disease on hemodialysis      HISTORY OF PRESENT ILLNESS:   70-year-old male with medical problem consisting of hypertension, dyslipidemia, history of rheumatic fever as a child, end-stage renal disease for which patient has been on hemodialysis since 2016/2017, abdominal aortic aneurysm s/p repair, AV fistula placed in 2017 for hemodialysis was initially seen in consultation on June 8, 2021 for abnormal CT scan of chest showing diffuse bony abnormality with sclerosis and lytic lesion along with anemia and hypercalcemia.  Patient had extensive blood work done, patient is here to discuss the results and further recommendation.  Denies any major weight loss.  Denies any lymph node enlargement.  Denies any bleeding.  Denies any drenching night sweats.          Past Medical History, Past Surgical History, Social History, Family History have been reviewed and are without significant changes except as mentioned.    Review of Systems   Constitutional: Positive for fatigue.   Respiratory: Positive for shortness of breath (With exertion).    Musculoskeletal: Positive for arthralgias.        Positive for left-sided rib cage pain   Neurological: Positive for numbness (Positive for chronic tingling and numbness affecting bilateral feet).   Hematological: Negative for adenopathy.      A comprehensive 14 point review of systems was performed and was negative except as mentioned.    Medications:  The current medication list was reviewed in the EMR    ALLERGIES:    Allergies   Allergen Reactions   • Dilaudid [Hydromorphone Hcl] Mental Status Change   • Morphine Nausea And Vomiting   • Tape Rash     Just plastic tape       Objective      Vitals:    06/16/21 0931   BP: 142/67   Pulse: 62   Resp: 20   Temp: 97.7 °F (36.5 °C)   TempSrc: Temporal   Weight: 113 kg (248 lb 8 oz)   Height: 180.3  "cm (70.98\")   PainSc:   4   PainLoc: Generalized     Current Status 6/16/2021   ECOG score 1       Physical Exam  Cardiovascular:      Rate and Rhythm: Normal rate and regular rhythm.   Pulmonary:      Breath sounds: Normal breath sounds.   Musculoskeletal:      Comments: Decreased range of motion   Neurological:      Mental Status: He is alert and oriented to person, place, and time.           RECENT LABS:  Glucose   Date Value Ref Range Status   06/08/2021 136 (H) 65 - 99 mg/dL Final     Sodium   Date Value Ref Range Status   06/08/2021 134 (L) 136 - 145 mmol/L Final     Potassium   Date Value Ref Range Status   06/08/2021 4.3 3.5 - 5.2 mmol/L Final     CO2   Date Value Ref Range Status   06/08/2021 35.0 (H) 22.0 - 29.0 mmol/L Final     Chloride   Date Value Ref Range Status   06/08/2021 95 (L) 98 - 107 mmol/L Final     Anion Gap   Date Value Ref Range Status   06/08/2021 4.0 (L) 5.0 - 15.0 mmol/L Final     Creatinine   Date Value Ref Range Status   06/08/2021 3.91 (H) 0.76 - 1.27 mg/dL Final     BUN   Date Value Ref Range Status   06/08/2021 30 (H) 8 - 23 mg/dL Final     BUN/Creatinine Ratio   Date Value Ref Range Status   06/08/2021 7.7 7.0 - 25.0 Final     Calcium   Date Value Ref Range Status   06/08/2021 10.2 8.6 - 10.5 mg/dL Final     eGFR Non  Amer   Date Value Ref Range Status   06/08/2021 15 (L) >60 mL/min/1.73 Final     Alkaline Phosphatase   Date Value Ref Range Status   06/08/2021 85 39 - 117 U/L Final     Total Protein   Date Value Ref Range Status   06/08/2021 9.0 (H) 6.0 - 8.5 g/dL Final     ALT (SGPT)   Date Value Ref Range Status   06/08/2021 12 1 - 41 U/L Final     AST (SGOT)   Date Value Ref Range Status   06/08/2021 14 1 - 40 U/L Final     Total Bilirubin   Date Value Ref Range Status   06/08/2021 0.4 0.0 - 1.2 mg/dL Final     Albumin   Date Value Ref Range Status   06/08/2021 4.10 3.50 - 5.20 g/dL Final   06/08/2021 3.6 2.9 - 4.4 g/dL Final     Globulin   Date Value Ref Range Status " "  06/08/2021 4.9 gm/dL Final     A/G Ratio   Date Value Ref Range Status   06/08/2021 0.8 0.7 - 1.7 Final     Lab Results   Component Value Date    WBC 5.62 06/08/2021    HGB 8.7 (L) 06/08/2021    HCT 27.0 (L) 06/08/2021    .8 (H) 06/08/2021     06/08/2021     Lab Results   Component Value Date    NEUTROABS 3.91 06/08/2021    IRON 63 06/08/2021    IRON 84 03/01/2017    TIBC 347 06/08/2021    TIBC 311 03/01/2017    LABIRON 18 (L) 06/08/2021    LABIRON 27 03/01/2017    FERRITIN 497.20 (H) 06/08/2021    FERRITIN 86.10 03/01/2017    PVSLFJSN25 >2,000 (H) 06/08/2021    [id number] 805 03/01/2017    FOLATE 5.97 06/08/2021    FOLATE >20.00 03/01/2017     No results found for: , LABCA2, AFPTM, HCGQUANT, , CHROMGRNA, 2OHNP78UHP, CEA, REFLABREPO      Component PSA   Latest Ref Rng & Units 0.000 - 4.000 ng/mL   1/5/2018 0.948   1/17/2018 0.85   2/15/2019 1.2   3/15/2019 0.95   2/14/2020 0.9   2/19/2021 1.5   6/8/2021 0.702            CHEVY + PE    Specimen Information: Blood        Component   Ref Range & Units 8 d ago   (6/8/21) 8 d ago   (6/8/21) 3 yr ago   (8/25/17)   IgG   603 - 1613 mg/dL 3223High       IgA   61 - 437 mg/dL 44Low       Comment: Result confirmed on concentration.   IgM   15 - 143 mg/dL 20      Comment: Result confirmed on concentration.   Total Protein   6.0 - 8.5 g/dL 8.5  9.0High   7.2 R    Albumin   2.9 - 4.4 g/dL 3.6  4.10 R  4.10 R    Alpha-1-Globulin   0.0 - 0.4 g/dL 0.4      Alpha-2-Globulin   0.4 - 1.0 g/dL 0.8      Beta Globulin   0.7 - 1.3 g/dL 0.8      Gamma Globulin   0.4 - 1.8 g/dL 2.9High       M-Randolph   Not Observed g/dL 2.7High       Globulin   2.2 - 3.9 g/dL 4.9High       A/G Ratio   0.7 - 1.7 0.8  0.8 R  1.3 R    Immunofixation Reflex, Serum  CommentAbnormal       Comment: Immunofixation shows IgG monoclonal protein with kappa light chain   specificity.   Please note that samples from patients receiving DARZALEX(R)   (daratumumab) treatment can appear as an \"IgG " "kappa\" and mask a   complete response. If this patient is receiving RENA, this CHEVY   assay interference can be removed by ordering test number   [id number]-\"Immunofixation, Daratumumab-Specific, Serum\" and   submitting a new sample for testing or by calling the lab to add   this test to the current sample.   Please note  Comment      Comment: Protein electrophoresis scan will follow via computer, mail, or    delivery.   Resulting Agency [facility]      Narrative  Performed by: [facility]  Performed at:  [facility] [address]  [id number]   : Raymon Langford PhD, Phone:  [phone]      Specimen Collected: 06/08/21 15:27 Last Resulted: 06/10/21 15:10                 Immunoglobulin Free LT Chains Blood    Specimen Information: Blood        Component   Ref Range & Units 8 d ago   Free Light Chain, Kappa   3.3 - 19.4 mg/L 626.9High     Free Lambda Light Chains   5.7 - 26.3 mg/L 16.7    Kappa/Lambda Ratio   0.26 - 1.65 37.54High     Resulting Agency [facility]      Narrative  Performed by: [facility]  Performed at:  [address] [facility]  [id number]   : Raymon Langford PhD, Phone:  [phone]      Specimen Collected: 06/08/21 15:27 Last Resulted: 06/10/21 14:08                       PATHOLOGY:  * Cannot find OR log *         RADIOLOGY DATA :  No radiology results for the last 7 days        Assessment/Plan     1.  Anemia:  -Patient was having recent worsening of anemia.  -Due to abnormal CT of chest shows sclerotic and lytic lesion patient had a myeloma work-up done on June 8, 2021 that shows abnormal M spike of 2.7 with IgG kappa immunofixation.  -Due to abnormal bone lesion as well as worsening anemia and intermittent hypercalcemia with abnormal M spike recommend bone marrow biopsy for further evaluation of abnormal serum protein electrophoresis and free light chain ratio.  -Risk and benefit of bone marrow biopsy were " discussed with patient and his wife.  -We will schedule patient for CT-guided bone marrow biopsy next week  -We will ask patient to return to clinic in 2 weeks after bone marrow biopsy to go over the results and further recommendation.  -Patient is requesting something for anxiety prior to bone marrow biopsy.  Prescription for Xanax 0.5 mg p.o. x1 has been sent to his pharmacy today.      2.  Abnormal bone impedance of CT scan of chest  -Differential at this point includes renal osteodystrophy versus myeloma.  But myeloma usually does not give sclerotic lesion  -PSA done is normal at 0.74.  -We are planning to do a bone marrow biopsy to rule out any plasma cell disorder.    3.  Hypertension    4.  Dyslipidemia    5.  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  End-stage renal disease on hemodialysis    7. Advance Care Planning: For now patient remains full code and is able to make decisions.  Patient has health care surrogate mentioned on chart.                 PHQ-9 Total Score: 0   -Patient is not homicidal or suicidal.  No acute intervention required.    Jesús Austin reports a pain score of 4.  Given his pain assessment as noted, treatment options were discussed and the following options were decided upon as a follow-up plan to address the patient's pain: continuation of current treatment plan for pain.         Vern Cartwright MD  6/16/2021  17:22 CDT        Part of this note may be an electronic transcription/translation of spoken language to printed text using the Dragon Dictation System.          CC:

## 2021-06-16 NOTE — PATIENT INSTRUCTIONS
Bone Marrow Aspiration and Bone Marrow Biopsy, Adult  Bone marrow aspiration and bone marrow biopsy are procedures that are done to diagnose blood disorders. These procedures may also be done to help diagnose cancer or certain infections.  Bone marrow is the soft tissue that is inside the bones. Blood cells are produced in bone marrow. For bone marrow aspiration, a sample of tissue in liquid form is removed from inside the bone. For a bone marrow biopsy, a small sample of solid bone marrow tissue is removed. These samples are examined under a microscope or tested in a lab.  You may need these procedures if you get an abnormal result on a blood test called a complete blood count (CBC). The aspiration or biopsy sample is usually taken from the upper part of the hip bone (iliac crest). Sometimes, an aspiration sample is taken from the chest bone (sternum).  Tell a health care provider about:  · Any allergies you have.  · All medicines you are taking, including vitamins, herbs, eye drops, creams, and over-the-counter medicines.  · Any problems you or family members have had with anesthetic medicines.  · Any blood or bone disorders you have.  · Any surgeries you have had.  · Any medical conditions you have.  · Any recent infections you have had, including skin infections.  · Whether you are pregnant or may be pregnant.  What are the risks?  Generally, this is a safe procedure. However, problems may occur, including:  · Bleeding.  · Infection.  · Persistent pain after the procedure.  · Cracking (fracture) of the bone.  · Allergic reactions to medicines.  · Damage to nearby organs, if the sample is taken from the sternum.  What happens before the procedure?  Staying hydrated  Follow instructions from your health care provider about hydration, which may include:  · Up to 2 hours before the procedure - you may continue to drink clear liquids, such as water, clear fruit juice, black coffee, and plain tea.    Eating and  drinking restrictions  Follow instructions from your health care provider about eating and drinking, which may include:  · 8 hours before the procedure - stop eating heavy meals or foods, such as meat, fried foods, or fatty foods.  · 6 hours before the procedure - stop eating light meals or foods, such as toast or cereal.  · 6 hours before the procedure - stop drinking milk or drinks that contain milk.  · 2 hours before the procedure - stop drinking clear liquids.  Medicines  Ask your health care provider about:  · Changing or stopping your regular medicines. This is especially important if you are taking diabetes medicines or blood thinners.  · Taking medicines such as aspirin and ibuprofen. These medicines can thin your blood. Do not take these medicines unless your health care provider tells you to take them.  · Taking over-the-counter medicines, vitamins, herbs, and supplements.  General instructions  · Plan to have someone take you home from the hospital or clinic.  · If you will be going home right after the procedure, plan to have someone with you for 24 hours.  · Ask your health care provider:  ? How your surgery site will be marked.  ? What steps will be taken to help prevent infection. These may include:  § Removing hair at the surgery site.  § Washing skin with a germ-killing soap.  What happens during the procedure?    · An IV may be inserted into one of your veins.  · You will be given one or more of the following:  ? A medicine to help you relax (sedative).  ? A medicine to numb the area (local anesthetic).  ? A medicine to make you fall asleep (general anesthetic).  · The bone marrow sample will be removed as follows:  ? For an aspiration, a hollow needle will be inserted through your skin and into your bone. Bone marrow fluid will be drawn up into a syringe.  ? For a biopsy, your health care provider will use a hollow needle to remove a small sample of solid tissue from your bone marrow.  · The  needle will be removed.  · Bone marrow fluid or tissue will be sent to a lab for examination.  · A bandage (dressing) will be placed over the insertion site and taped in place.  The procedure may vary among health care providers and hospitals.  What happens after the procedure?  · Your blood pressure, heart rate, breathing rate, and blood oxygen level will be monitored until you leave the hospital or clinic.  · Your IV will be removed, and the insertion site will be checked for bleeding.  · Do not drive for 24 hours if you were given a sedative during your procedure.  · It is up to you to get the results of your procedure. Ask your health care provider, or the department that is doing the procedure, when your results will be ready.  Summary  · Bone marrow aspiration and bone marrow biopsy are procedures that are done to diagnose blood disorders. These procedures may also be done to help diagnose cancer or certain infections.  · Before the procedure, tell your health care provider about all medicines you are taking, including vitamins, herbs, eye drops, creams, and over-the-counter medicines.  · Plan to have someone take you home from the hospital or clinic.  · During the aspiration procedure, a sample of tissue in liquid form is removed from inside the bone. For a bone marrow biopsy, a small sample of solid bone marrow tissue is removed.  · After the procedure, you will be monitored and checked for bleeding.  This information is not intended to replace advice given to you by your health care provider. Make sure you discuss any questions you have with your health care provider.  Document Revised: 05/05/2020 Document Reviewed: 05/05/2020  [employer] Patient Education © 2021 [employer]    Bone Marrow Aspiration and Bone Marrow Biopsy, Adult  Bone marrow aspiration and bone marrow biopsy are procedures that are done to diagnose blood disorders. These procedures may also be done to help diagnose cancer or certain  infections.  Bone marrow is the soft tissue that is inside the bones. Blood cells are produced in bone marrow. For bone marrow aspiration, a sample of tissue in liquid form is removed from inside the bone. For a bone marrow biopsy, a small sample of solid bone marrow tissue is removed. These samples are examined under a microscope or tested in a lab.  You may need these procedures if you get an abnormal result on a blood test called a complete blood count (CBC). The aspiration or biopsy sample is usually taken from the upper part of the hip bone (iliac crest). Sometimes, an aspiration sample is taken from the chest bone (sternum).  Tell a health care provider about:  · Any allergies you have.  · All medicines you are taking, including vitamins, herbs, eye drops, creams, and over-the-counter medicines.  · Any problems you or family members have had with anesthetic medicines.  · Any blood or bone disorders you have.  · Any surgeries you have had.  · Any medical conditions you have.  · Any recent infections you have had, including skin infections.  · Whether you are pregnant or may be pregnant.  What are the risks?  Generally, this is a safe procedure. However, problems may occur, including:  · Bleeding.  · Infection.  · Persistent pain after the procedure.  · Cracking (fracture) of the bone.  · Allergic reactions to medicines.  · Damage to nearby organs, if the sample is taken from the sternum.  What happens before the procedure?  Staying hydrated  Follow instructions from your health care provider about hydration, which may include:  · Up to 2 hours before the procedure - you may continue to drink clear liquids, such as water, clear fruit juice, black coffee, and plain tea.    Eating and drinking restrictions  Follow instructions from your health care provider about eating and drinking, which may include:  · 8 hours before the procedure - stop eating heavy meals or foods, such as meat, fried foods, or fatty  foods.  · 6 hours before the procedure - stop eating light meals or foods, such as toast or cereal.  · 6 hours before the procedure - stop drinking milk or drinks that contain milk.  · 2 hours before the procedure - stop drinking clear liquids.  Medicines  Ask your health care provider about:  · Changing or stopping your regular medicines. This is especially important if you are taking diabetes medicines or blood thinners.  · Taking medicines such as aspirin and ibuprofen. These medicines can thin your blood. Do not take these medicines unless your health care provider tells you to take them.  · Taking over-the-counter medicines, vitamins, herbs, and supplements.  General instructions  · Plan to have someone take you home from the hospital or clinic.  · If you will be going home right after the procedure, plan to have someone with you for 24 hours.  · Ask your health care provider:  ? How your surgery site will be marked.  ? What steps will be taken to help prevent infection. These may include:  § Removing hair at the surgery site.  § Washing skin with a germ-killing soap.  What happens during the procedure?    · An IV may be inserted into one of your veins.  · You will be given one or more of the following:  ? A medicine to help you relax (sedative).  ? A medicine to numb the area (local anesthetic).  ? A medicine to make you fall asleep (general anesthetic).  · The bone marrow sample will be removed as follows:  ? For an aspiration, a hollow needle will be inserted through your skin and into your bone. Bone marrow fluid will be drawn up into a syringe.  ? For a biopsy, your health care provider will use a hollow needle to remove a small sample of solid tissue from your bone marrow.  · The needle will be removed.  · Bone marrow fluid or tissue will be sent to a lab for examination.  · A bandage (dressing) will be placed over the insertion site and taped in place.  The procedure may vary among health care providers  and hospitals.  What happens after the procedure?  · Your blood pressure, heart rate, breathing rate, and blood oxygen level will be monitored until you leave the hospital or clinic.  · Your IV will be removed, and the insertion site will be checked for bleeding.  · Do not drive for 24 hours if you were given a sedative during your procedure.  · It is up to you to get the results of your procedure. Ask your health care provider, or the department that is doing the procedure, when your results will be ready.  Summary  · Bone marrow aspiration and bone marrow biopsy are procedures that are done to diagnose blood disorders. These procedures may also be done to help diagnose cancer or certain infections.  · Before the procedure, tell your health care provider about all medicines you are taking, including vitamins, herbs, eye drops, creams, and over-the-counter medicines.  · Plan to have someone take you home from the hospital or clinic.  · During the aspiration procedure, a sample of tissue in liquid form is removed from inside the bone. For a bone marrow biopsy, a small sample of solid bone marrow tissue is removed.  · After the procedure, you will be monitored and checked for bleeding.  This information is not intended to replace advice given to you by your health care provider. Make sure you discuss any questions you have with your health care provider.  Document Revised: 05/05/2020 Document Reviewed: 05/05/2020  [employer] Patient Education © 2021 [employer]

## 2021-06-20 PROBLEM — N25.81 SECONDARY HYPERPARATHYROIDISM OF RENAL ORIGIN (HCC): Status: ACTIVE | Noted: 2020-06-30

## 2021-06-20 PROBLEM — I13.10 BENIGN HYPERTENSIVE HEART AND KIDNEY DISEASE WITH CHRONIC KIDNEY DISEASE: Status: ACTIVE | Noted: 2018-02-05

## 2021-06-20 PROBLEM — K92.1 BLOOD IN STOOL: Chronic | Status: ACTIVE | Noted: 2021-01-01

## 2021-06-20 PROBLEM — T78.40XA ALLERGY, UNSPECIFIED, INITIAL ENCOUNTER: Status: ACTIVE | Noted: 2020-01-01

## 2021-06-20 PROBLEM — E87.5 HYPERKALEMIA: Status: ACTIVE | Noted: 2021-01-01

## 2021-06-20 PROBLEM — T78.2XXA ANAPHYLACTIC SHOCK, UNSPECIFIED, INITIAL ENCOUNTER: Status: ACTIVE | Noted: 2020-01-01

## 2021-06-20 PROBLEM — E03.9 ACQUIRED HYPOTHYROIDISM: Status: ACTIVE | Noted: 2017-03-08

## 2021-06-20 PROBLEM — D63.1 ANEMIA IN CHRONIC KIDNEY DISEASE: Status: ACTIVE | Noted: 2017-08-25

## 2021-06-20 PROBLEM — N18.6 ANEMIA DUE TO CHRONIC KIDNEY DISEASE, ON CHRONIC DIALYSIS (HCC): Status: ACTIVE | Noted: 2021-01-01

## 2021-06-20 PROBLEM — Z49.31 AFTERCARE INCLUDING INTERMITTENT DIALYSIS (HCC): Status: ACTIVE | Noted: 2017-08-25

## 2021-06-20 PROBLEM — D63.1 ANEMIA DUE TO CHRONIC KIDNEY DISEASE, ON CHRONIC DIALYSIS (HCC): Status: ACTIVE | Noted: 2021-01-01

## 2021-06-20 PROBLEM — R51.9 HEADACHE, UNSPECIFIED: Status: ACTIVE | Noted: 2020-01-01

## 2021-06-20 PROBLEM — E83.30 DISORDER OF PHOSPHORUS METABOLISM, UNSPECIFIED: Status: ACTIVE | Noted: 2019-02-04

## 2021-06-20 PROBLEM — N18.9 ANEMIA IN CHRONIC KIDNEY DISEASE: Status: ACTIVE | Noted: 2017-08-25

## 2021-06-20 PROBLEM — Z94.0 KIDNEY TRANSPLANT STATUS, LIVING UNRELATED DONOR: Status: ACTIVE | Noted: 2018-02-05

## 2021-06-20 PROBLEM — E88.09 OTHER DISORDERS OF PLASMA-PROTEIN METABOLISM, NOT ELSEWHERE CLASSIFIED: Status: ACTIVE | Noted: 2020-01-01

## 2021-06-20 PROBLEM — M51.36 DEGENERATION OF INTERVERTEBRAL DISC OF LUMBAR REGION: Status: ACTIVE | Noted: 2018-05-18

## 2021-06-20 PROBLEM — Z99.2 ANEMIA DUE TO CHRONIC KIDNEY DISEASE, ON CHRONIC DIALYSIS (HCC): Status: ACTIVE | Noted: 2021-01-01

## 2021-06-20 PROBLEM — D50.9 IRON DEFICIENCY ANEMIA: Status: ACTIVE | Noted: 2017-08-30

## 2021-06-20 PROBLEM — N40.1 BENIGN PROSTATIC HYPERPLASIA WITH NOCTURIA: Status: ACTIVE | Noted: 2020-02-14

## 2021-06-20 PROBLEM — Z11.1 ENCOUNTER FOR SCREENING FOR RESPIRATORY TUBERCULOSIS: Status: ACTIVE | Noted: 2019-08-03

## 2021-06-20 PROBLEM — R35.1 BENIGN PROSTATIC HYPERPLASIA WITH NOCTURIA: Status: ACTIVE | Noted: 2020-02-14

## 2021-06-22 NOTE — POST-PROCEDURE NOTE
Pre-Op Diagnosis:  Abnormal blood work  Post-Op Diagnosis: Abnormal blood work  Procedure: CT directed bone marrow biopsy  Anesthesia: local  EBL: none  Specimens:   Specimen sent to LAB / PATH  Findings: Specimen sent, awaiting results  Complications: none  Disposition:  Patient tolerated the procedure well

## 2021-06-22 NOTE — PRE-PROCEDURE NOTE
Patient for bone marrow biopsy.Procedure, risks , and benefits discussed with patient and patient  gave informed consent.  H&P dated 6/22/2021 reviewed with no changes.

## 2021-06-24 NOTE — TELEPHONE ENCOUNTER
"----- Message from Pauline Franklin RD sent at 6/23/2021  3:35 PM CDT -----  Regarding: O2 92%  Hi Kanika,  Mr Austin got out of hospital yesterday.   #Oxygen \"92\" today--wife says short of breathe and gasps at times.   #Was sent home with x2 BP scripts--has not filled due to 110/48 BP this am.   #Experiencing pain in ribs and at site of bone biopsy.   Thanks,  Pauline"

## 2021-06-24 NOTE — TELEPHONE ENCOUNTER
Called and spoke with pt wife regarding pt status and possibly going to the ER for sob.  Pt wife states she has been monitoring  and will take him if needed.

## 2021-06-28 NOTE — ANESTHESIA POSTPROCEDURE EVALUATION
Occupational Therapy  Patient not seen in therapy today.     Unavailable due to medical tests/procedures.      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          Post Anesthesia Care and Evaluation    Patient location during evaluation: PACU  Patient participation: complete - patient participated  Level of consciousness: awake and alert  Pain score: 1  Pain management: adequate  Airway patency: patent  Anesthetic complications: No anesthetic complications  PONV Status: none  Cardiovascular status: acceptable  Respiratory status: acceptable  Hydration status: acceptable

## 2021-06-28 NOTE — ANESTHESIA PREPROCEDURE EVALUATION
Anesthesia Evaluation     Patient summary reviewed and Nursing notes reviewed                Airway   No difficulty expected  Dental      Pulmonary - negative pulmonary ROS and normal exam   Cardiovascular - normal exam    (+) hypertension, hyperlipidemia,  carotid artery disease      Neuro/Psych  (+) CVA, headaches,     GI/Hepatic/Renal/Endo    (+)   renal disease,     Musculoskeletal     Abdominal   (+) obese,    Substance History - negative use     OB/GYN negative ob/gyn ROS         Other   arthritis,    history of cancer                    Anesthesia Plan    ASA 3     MAC     intravenous induction     Anesthetic plan, all risks, benefits, and alternatives have been provided, discussed and informed consent has been obtained with: patient.

## 2021-07-07 PROBLEM — C90.00 MULTIPLE MYELOMA NOT HAVING ACHIEVED REMISSION (HCC): Status: ACTIVE | Noted: 2021-01-01

## 2021-07-07 PROBLEM — D50.9 IRON DEFICIENCY ANEMIA: Chronic | Status: ACTIVE | Noted: 2017-08-30

## 2021-07-07 NOTE — PROGRESS NOTES
DATE OF VISIT: 7/7/2021      REASON FOR VISIT: Multiple myeloma, abnormal imaging of bone showing sclerosis and lytic lesion, anemia, hypercalcemia, end-stage renal disease on hemodialysis      HISTORY OF PRESENT ILLNESS:   70-year-old male with medical problem consisting of hypertension, dyslipidemia, history of rheumatic fever as a child, end-stage renal disease for which patient is on hemodialysis since 2017, abdominal aortic aneurysm s/p repair, AV fistula placed in 2017 for hemodialysis was initially seen in consultation on June 8, 2021 for abnormal CT scan of chest showing diffuse bony abnormality with sclerosis and lytic lesion along with anemia and hypercalcemia.  Patient had a bone marrow biopsy done on June 20, 2021.  Patient is here to discuss the results and further recommendation.  Complains of diffuse generalized bone pain.  Complains of hip pain at the site of bone marrow biopsy.  Denies any bleeding.  Denies any new lymph node enlargement.  Denies any drenching night sweats.          Past Medical History, Past Surgical History, Social History, Family History have been reviewed and are without significant changes except as mentioned.    Review of Systems   Constitutional: Positive for fatigue.   Respiratory: Positive for shortness of breath (With exertion).    Musculoskeletal: Positive for arthralgias.        Complains of generalized body pain.   Neurological: Positive for numbness (Positive for tingling and numbness affecting bilateral feet).   Hematological: Negative for adenopathy.      A comprehensive 14 point review of systems was performed and was negative except as mentioned.    Medications:  The current medication list was reviewed in the EMR    ALLERGIES:    Allergies   Allergen Reactions   • Dilaudid [Hydromorphone Hcl] Mental Status Change   • Tape Rash     Just plastic tape       Objective      Vitals:    07/07/21 0916   BP: 156/76   Pulse: 86   Resp: 20   Temp: 98.4 °F (36.9 °C)  "  TempSrc: Temporal   SpO2: 93%   Weight: 109 kg (240 lb 12.8 oz)   Height: 180.3 cm (70.98\")   PainSc:   7   PainLoc: Generalized     Current Status 7/7/2021   ECOG score 1       Physical Exam  Cardiovascular:      Rate and Rhythm: Normal rate and regular rhythm.   Pulmonary:      Breath sounds: Normal breath sounds.   Musculoskeletal:      Comments: Trace edema.  Decreased range of motion.   Neurological:      Mental Status: He is alert and oriented to person, place, and time.           RECENT LABS:  Glucose   Date Value Ref Range Status   06/21/2021 101 (H) 65 - 99 mg/dL Final     Sodium   Date Value Ref Range Status   06/21/2021 133 (L) 136 - 145 mmol/L Final     Potassium   Date Value Ref Range Status   06/21/2021 4.1 3.5 - 5.2 mmol/L Final     CO2   Date Value Ref Range Status   06/21/2021 26.0 22.0 - 29.0 mmol/L Final     Chloride   Date Value Ref Range Status   06/21/2021 97 (L) 98 - 107 mmol/L Final     Anion Gap   Date Value Ref Range Status   06/21/2021 10.0 5.0 - 15.0 mmol/L Final     Creatinine   Date Value Ref Range Status   06/21/2021 5.15 (H) 0.76 - 1.27 mg/dL Final     BUN   Date Value Ref Range Status   06/21/2021 43 (H) 8 - 23 mg/dL Final     BUN/Creatinine Ratio   Date Value Ref Range Status   06/21/2021 8.3 7.0 - 25.0 Final     Calcium   Date Value Ref Range Status   06/21/2021 10.7 (H) 8.6 - 10.5 mg/dL Final     eGFR Non  Amer   Date Value Ref Range Status   06/21/2021 11 (L) >60 mL/min/1.73 Final     Comment:     <15 Indicative of kidney failure.     Alkaline Phosphatase   Date Value Ref Range Status   06/20/2021 74 39 - 117 U/L Final     Total Protein   Date Value Ref Range Status   06/20/2021 8.9 (H) 6.0 - 8.5 g/dL Final     ALT (SGPT)   Date Value Ref Range Status   06/20/2021 8 1 - 41 U/L Final     AST (SGOT)   Date Value Ref Range Status   06/20/2021 16 1 - 40 U/L Final     Total Bilirubin   Date Value Ref Range Status   06/20/2021 0.4 0.0 - 1.2 mg/dL Final     Albumin   Date Value " Ref Range Status   06/20/2021 4.00 3.50 - 5.20 g/dL Final     Globulin   Date Value Ref Range Status   06/20/2021 4.9 gm/dL Final     A/G Ratio   Date Value Ref Range Status   06/08/2021 0.8 0.7 - 1.7 Final     Lab Results   Component Value Date    WBC 5.62 07/07/2021    HGB 7.6 (L) 07/07/2021    HCT 24.6 (L) 07/07/2021    .7 (H) 07/07/2021     07/07/2021     Lab Results   Component Value Date    NEUTROABS 3.40 07/07/2021    IRON 71 07/07/2021    IRON 63 06/08/2021    IRON 84 03/01/2017    TIBC 297 (L) 07/07/2021    TIBC 347 06/08/2021    TIBC 311 03/01/2017    LABIRON 24 07/07/2021    LABIRON 18 (L) 06/08/2021    LABIRON 27 03/01/2017    FERRITIN 691.00 (H) 07/07/2021    FERRITIN 497.20 (H) 06/08/2021    FERRITIN 86.10 03/01/2017    LAEKSJUW81 >2,000 (H) 06/08/2021    MOPRKZMO65 805 03/01/2017    FOLATE 5.97 06/08/2021    FOLATE >20.00 03/01/2017     No results found for: , LABCA2, AFPTM, HCGQUANT, , CHROMGRNA, 9AQPM80PCX, CEA, REFLABREPO      PATHOLOGY:  Pathology report from June 22, 2021 showed:            RADIOLOGY DATA :  No radiology results for the last 7 days        Assessment/Plan     1.  Multiple myeloma:  -Due to abnormal bone imaging on CT scan with sclerosis and lytic lesion patient had a serum protein electrophoresis done that shows abnormal M spike with IgG kappa immunofixation.  -Had a bone marrow biopsy done on June 22, 2021 that showed about 80% cellularity and plasma cells constitute about 35% of cellularity of bone marrow.  -Discussed with patient and his wife patient does have intermittent hypercalcemia, anemia, lytic lesion along with sclerotic lesion on CT scan of chest and has been on dialysis for 4 years along with bone marrow biopsy showing 10 more than 10% plasma cell that makes diagnosis of multiple myeloma  -Due to his end-stage renal disease this treatment option would be very limited.  -On top of that patient already has existing neuropathy affecting bilateral  feet.  -Option of starting low-dose Cytoxan, Velcade and dexamethasone was discussed with patient.  -Patient and his wife are not sure if patient wants to try any chemotherapy at present.  -Due to unusual nature of his bone imaging recommend skeletal survey for further evaluation as well as recommendation from tertiary care center prior to initiation of treatment.  -Referral has been placed for [facility], [place], operated by [facility].  Case was discussed with Dr. Pleitez on July 6, 2021  -It was also discussed with patient with chemotherapy alone myeloma is not a curable condition.  -It was also discussed with patient without any treatment diagnosis of myeloma is life-threatening.  -We will ask patient to return to clinic in 2 weeks after her appointment at [facility] for further recommendation    2.  Anemia:  -Multifactorial  -Patient did have EGD and colonoscopy done on July 2, 2021 that showed angiodysplastic lesion in colon which was cauterized by Dr. El  -Hemoglobin today 7.6.  Iron studies are adequate.    3.  Hypertension    4.  Dyslipidemia    5.  Generalized bone pain and hip pain from bone marrow biopsy  -Prescription for Lortab has been sent to his pharmacy today    6.  End-stage renal disease on hemodialysis    7.  Health maintenance: Patient does not smoke    8. Advance Care Planning: For now patient remains full code and is able to make decisions.  Patient has health care surrogate mentioned on chart.                 PHQ-9 Total Score: 1   -Patient is not homicidal or suicidal.  No acute intervention required.    Jesús Austin reports a pain score of 7.  Given his pain assessment as noted, treatment options were discussed and the following options were decided upon as a follow-up plan to address the patient's pain: continuation of current treatment plan for pain.         Vern Cartwright MD  7/7/2021  18:23 CDT        Part of this note may be an electronic transcription/translation of spoken language to printed text using  the Dragon Dictation System.          CC:

## 2021-07-15 PROBLEM — C90.00 METASTATIC MULTIPLE MYELOMA TO BONE (HCC): Status: ACTIVE | Noted: 2021-01-01

## 2021-07-15 NOTE — SIGNIFICANT NOTE
07/15/21 2160   OTHER   Discipline occupational therapist;physical therapist   Rehab Time/Intention   Session Not Performed patient unavailable for evaluation     Pt in dialysis. OT/PT will follow up for eval as time allows and pt is available.

## 2021-07-15 NOTE — H&P
[facility] Medicine Admission      Date of Admission: 7/15/2021, 07:30 T      Primary Care Physician: Luis Miguel Orourke MD      Chief Complaint: Back pain    HPI: 70-year-old male with history of multiple myeloma involving lytic lesions of the spine, end-stage renal disease on hemodialysis, hypertension, hyperlipidemia presents to the emergency department with increasing back pain.  Patient Dors is worsening back pain that is not been able to be controlled with home pain regimen and this has prevented him from ambulating effectively.  He endorses generalized weakness and fatigue.  No fevers, chills or sweats.  No recent falls or injuries.    Concurrent Medical History:  has a past medical history of Abdominal aortic aneurysm (CMS/HCC), Allergic rhinitis, BPH (benign prostatic hyperplasia), Carotid artery stenosis, Chronic anemia, Diverticulitis, ESRD (end stage renal disease) on dialysis (CMS/HCC), History of transfusion, Hypercholesteremia, Hypertension, Hypothyroidism, Malignant tumor of Meckel's diverticulum (CMS/HCC), Multiple myeloma (CMS/HCC), Nephrolithiasis, Osteoarthritis, Rheumatic fever, Secondary hyperparathyroidism of renal origin (CMS/HCC), and Stroke (CMS/HCC).    Past Surgical History:  has a past surgical history that includes AAA repair, open; Testicle Torsion Repair (N/A); AV fistula placement (Left); Interventional radiology procedure (N/A, 8/23/2017); Interventional radiology procedure (Left, 6/5/2020); arteriovenous fistula/shunt surgery (Left, 6/17/2020); Interventional radiology procedure (Left, 8/27/2020); Esophagogastroduodenoscopy (N/A, 6/28/2021); and Colonoscopy (N/A, 6/28/2021).    Family History: family history includes Heart failure in his father.     Social History:  reports that he has never smoked. He has never used smokeless tobacco. He reports that he does not drink alcohol and does not use drugs.    Allergies:   Allergies   Allergen  Reactions   • Dilaudid [Hydromorphone Hcl] Mental Status Change   • Tape Rash     Just plastic tape       Medications:   Prior to Admission medications    Medication Sig Start Date End Date Taking? Authorizing Provider   acetaminophen (TYLENOL) 650 MG 8 hr tablet Take 650 mg by mouth Every 8 (Eight) Hours As Needed for Mild Pain .    ProviderBouchra MD   albuterol sulfate  (90 Base) MCG/ACT inhaler INHALE 2 PUFFS INTO THE LUNGS EVERY 4 (FOUR) HOURS AS NEEDED FOR WHEEZING. 6/30/21   Bouchra Mack MD   allopurinol (ZYLOPRIM) 100 MG tablet Take 100 mg by mouth Daily.    Nurse Berto Toro RN   amitriptyline (ELAVIL) 25 MG tablet Take 25 mg by mouth Every Night.    ProviderBouchra MD   amLODIPine (NORVASC) 10 MG tablet Take 1 tablet by mouth Daily. 6/23/21   Surinder Davidson MD   aspirin 81 MG EC tablet Take 81 mg by mouth Daily.    ProviderBouchra MD   CO ENZYME Q-10 PO Take 100 mg by mouth Daily.    Emergency, Nurse Berto RN   docusate sodium (COLACE) 100 MG capsule Take 100 mg by mouth 2 (Two) Times a Day.    ProviderBouchra MD   fluticasone (FLONASE) 50 MCG/ACT nasal spray 1 spray into the nostril(s) as directed by provider Daily As Needed.    Emergency, Nurse Berto RN   folic acid (FOLVITE) 1 MG tablet Take 1 tablet by mouth Daily. 6/9/21   Vern Cartwright MD   furosemide (LASIX) 80 MG tablet Take 80 mg by mouth Daily. On non dialysis days, MWF and sunday    Nurse Berto Toro RN   hydrALAZINE (APRESOLINE) 25 MG tablet Take 1 tablet by mouth Every 8 (Eight) Hours. 6/22/21   Surinder Davidson MD   HYDROcodone-acetaminophen (NORCO) 7.5-325 MG per tablet Take 1 tablet by mouth Every 6 (Six) Hours As Needed for Moderate Pain . 7/7/21   Vern Cartwright MD   levothyroxine (SYNTHROID, LEVOTHROID) 50 MCG tablet Take 50 mcg by mouth Daily.    Nurse Berto Toro RN   lidocaine-prilocaine (EMLA) 2.5-2.5 % cream APPLY SMALL AMOUNT TO ACCESS SITE (AVF) 1 TO 2 HOURS BEFORE  DIALYSIS. COVER WITH OCCLUSIVE DRESSING (SARAN WRAP) 4/1/21   Bouchra Mack MD   Methoxy PEG-Epoetin Beta (MIRCERA IJ) 225 mcg Every 14 (Fourteen) Days. 7/1/21 6/30/22  Bouchra Mack MD   metoprolol succinate XL (TOPROL-XL) 25 MG 24 hr tablet Take 25 mg by mouth Every Night.    Emergency, Nurse Berto RN   Omega-3 Fatty Acids (FISH OIL) 1000 MG capsule capsule Take 1,200 mg by mouth Every Night.    Emergency, Nurse Epic, RN   omeprazole (priLOSEC) 20 MG capsule Take 20 mg by mouth Daily.    Bouchra Mack MD   rosuvastatin (CRESTOR) 10 MG tablet Take 10 mg by mouth Every Night.    Muna, Nurse Berto RN   sevelamer (RENVELA) 800 MG tablet Take 2,400 mg by mouth 3 (Three) Times a Day With Meals. Takes 3 pills before every meal    Bouchra Mack MD   sodium polystyrene (KAYEXALATE) 15 GM/60ML suspension Take 15 g by mouth Daily.    Bouchra Mack MD       Review of Systems:  A complete 10 point review of systems was obtained and was negative unless noted in the HPI.    Physical Exam:   Temp:  [98 °F (36.7 °C)] 98 °F (36.7 °C)  Heart Rate:  [70-80] 70  Resp:  [16] 16  BP: (159-184)/(76-92) 159/85  Physical Exam  Constitutional:       Appearance: Normal appearance.   HENT:      Head: Normocephalic.      Nose: Nose normal.      Mouth/Throat:      Mouth: Mucous membranes are moist.      Pharynx: Oropharynx is clear.   Eyes:      Extraocular Movements: Extraocular movements intact.      Conjunctiva/sclera: Conjunctivae normal.   Cardiovascular:      Rate and Rhythm: Normal rate and regular rhythm.   Pulmonary:      Effort: Pulmonary effort is normal.      Breath sounds: Normal breath sounds.   Abdominal:      General: There is no distension.      Palpations: Abdomen is soft.   Musculoskeletal:         General: Tenderness present. Normal range of motion.      Cervical back: Normal range of motion.   Skin:     General: Skin is warm.   Neurological:      General: No focal deficit  present.      Mental Status: He is alert. Mental status is at baseline.   Psychiatric:         Mood and Affect: Mood normal.         Behavior: Behavior normal.           Results Reviewed:  I have personally reviewed current lab, radiology, and data and agree with results.  Lab Results (last 24 hours)     Procedure Component Value Units Date/Time    Urinalysis, Microscopic Only - Urine, Clean Catch [[id number]]  (Abnormal) Collected: 07/15/21 0611    Specimen: Urine, Clean Catch Updated: 07/15/21 0633     RBC, UA 0-2 /HPF      WBC, UA 3-5 /HPF      Bacteria, UA None Seen /HPF      Squamous Epithelial Cells, UA None Seen /HPF      Hyaline Casts, UA None Seen /LPF      Methodology Automated Microscopy    Urinalysis With Microscopic If Indicated (No Culture) - Urine, Clean Catch [[id number]]  (Abnormal) Collected: 07/15/21 0611    Specimen: Urine, Clean Catch Updated: 07/15/21 0633     Color, UA Yellow     Appearance, UA Clear     pH, UA 8.0     Specific Gravity, UA 1.016     Glucose, UA Negative     Ketones, UA Negative     Bilirubin, UA Negative     Blood, UA Negative     Protein,  mg/dL (2+)     Leuk Esterase, UA Negative     Nitrite, UA Negative     Urobilinogen, UA 0.2 E.U./dL    Comprehensive Metabolic Panel [[id number]]  (Abnormal) Collected: 07/15/21 0525    Specimen: Blood Updated: 07/15/21 0557     Glucose 122 mg/dL      BUN 60 mg/dL      Creatinine 6.32 mg/dL      Sodium 135 mmol/L      Potassium 4.2 mmol/L      Chloride 96 mmol/L      CO2 29.0 mmol/L      Calcium 10.3 mg/dL      Total Protein 8.3 g/dL      Albumin 3.70 g/dL      ALT (SGPT) 7 U/L      AST (SGOT) 10 U/L      Alkaline Phosphatase 67 U/L      Total Bilirubin 0.3 mg/dL      eGFR Non African Amer 9 mL/min/1.73      Comment: <15 Indicative of kidney failure.        eGFR   Amer --     Comment: <15 Indicative of kidney failure.        Globulin 4.6 gm/dL      A/G Ratio 0.8 g/dL      BUN/Creatinine Ratio 9.5     Anion Gap 10.0 mmol/L      Narrative:      GFR Normal >60  Chronic Kidney Disease <60  Kidney Failure <15      CBC & Differential [[id number]]  (Abnormal) Collected: 07/15/21 0525    Specimen: Blood Updated: 07/15/21 0543    Narrative:      The following orders were created for panel order CBC & Differential.  Procedure                               Abnormality         Status                     ---------                               -----------         ------                     CBC Auto Differential[[id number]]        Abnormal            Final result                 Please view results for these tests on the individual orders.    CBC Auto Differential [[id number]]  (Abnormal) Collected: 07/15/21 0525    Specimen: Blood Updated: 07/15/21 0543     WBC 10.43 10*3/mm3      RBC 2.40 10*6/mm3      Hemoglobin 7.8 g/dL      Hematocrit 24.4 %      .7 fL      MCH 32.5 pg      MCHC 32.0 g/dL      RDW 18.8 %      RDW-SD 70.9 fl      MPV 10.3 fL      Platelets 276 10*3/mm3      Neutrophil % 83.3 %      Lymphocyte % 7.7 %      Monocyte % 8.0 %      Eosinophil % 0.0 %      Basophil % 0.1 %      Immature Grans % 0.9 %      Neutrophils, Absolute 8.70 10*3/mm3      Lymphocytes, Absolute 0.80 10*3/mm3      Monocytes, Absolute 0.83 10*3/mm3      Eosinophils, Absolute 0.00 10*3/mm3      Basophils, Absolute 0.01 10*3/mm3      Immature Grans, Absolute 0.09 10*3/mm3      nRBC 0.0 /100 WBC         Imaging Results (Last 24 Hours)     Procedure Component Value Units Date/Time    CT Lumbar Spine Without Contrast [[id number]] Collected: 07/15/21 0537     Updated: 07/15/21 0629    Narrative:      CT LUMBAR SPINE    Clinical history:back pain, history of multiple myeloma    TECHNIQUE:    Helically acquired images were obtained of the lumbar spine. 2D  reformats were reviewed. A radiation dose optimization technique  was used for this scan.  IV Contrast dosage and agent: [None.]    COMPARISON: May 14, 2018    FINDINGS:      []  VERTEBRAE: There is a markedly  heterogenous appearance to the  vertebral bodies as well as the osseous structures of the pelvis  including the sacrum. This was not seen on the prior study.  Multiple lytic lesions are noted to small to count. This also  involves the posterior elements. No acute fracture.  VERTEBRAL ALIGNMENT: [No spondylolisthesis.]   [There is preservation of the normal lumbar lordosis.]  DISCS: Degenerative discogenic changes are noted.  Decreased disc  space L5-S1 similar to prior study. At L4-5 there is a diffuse  annular bulge with facet arthrosis and ligamentum flavum  hypertrophy. The canal is narrowed to approximately 7 mm at this  level.. At the level of L5-S1 there is subarticular recess  narrowing. No central canal stenosis  INCLUDED RETROPERITONEUM: The endovascular stent graft is again  noted within the infrarenal aorta extending into the iliac  arteries similar to prior study  [There is no retroperitoneal adenopathy.]   []      Impression:      1.  Interval development of a markedly heterogenous appearance of  the vertebral bodies as well as the osseous structures of the  pelvis including the sacrum. Multiple lytic lesions are noted.  Findings are consistent with the patient's history of multiple  myeloma.  2.  Degenerative discogenic changes of the lumbar spine greatest  at L4-5 where there is a diffuse annular bulge with facet  arthrosis and ligamentum flavum hypertrophy resulting in mild to  moderate central canal stenosis and subarticular recess  narrowing.    Electronically signed by:  Marielos Pimentel DO  7/15/2021 6:28  AM CDT Workstation: [id number]    XR Chest 1 View [[id number]] Collected: 07/15/21 0540     Updated: 07/15/21 0618    Narrative:      CLINICAL HISTORY:  weakness    EXAMINATION:  XR CHEST 1 VW    COMPARISON:  6/20/2021    FINDINGS:  There is a lordotic projection. The heart is enlarged. The  pulmonary vascularity is within normal limits.  Small area of  left basilar density near the  costophrenic sulcus could represent  any combination of a small left pleural effusion as well as  atelectasis.  No pleural effusion is seen on the right.      Impression:      Cardiomegaly. Small left basilar density near the costophrenic  sulcus which could represent small left pleural effusion as well  as atelectasis.     Electronically signed by:  Jose Armando Fryepriya WALSH  7/15/2021 6:16 AM CDT  Workstation: [id number]            Assessment:    Active Hospital Problems    Diagnosis    • Metastatic multiple myeloma to bone (CMS/HCC)              Plan:    #1.  Back pain secondary to multiple myeloma.  #2.  End-stage renal disease on hemodialysis.  #3.  Hypertension.  #4.  Chronic anemia.    CT of the lumbar spine revealing multiple lytic lesions affecting lumbar vertebral bodies and osseous structures of the pelvis and sacrum, consistent with known multiple myeloma.  Physical therapy and Occupational Therapy consulted.  Pain regimen as needed.  Nephrology consulted for continuation of dialysis.  Patient dialyzes Tuesdays Thursdays and Saturdays.  Continue with home antihypertensive regimen.    CODE STATUS: Full code  DVT prophylaxis: Subcutaneous heparin  Diet: Renal and cardiac  Disposition: Observation    Benitez Madera MD  Hospitalist Service

## 2021-07-15 NOTE — CONSULTS
[facility]  [address] [place] [address]. [id number]  T - [phone]    [phone]     Consultation         PATIENT  DEMOGRAPHICS   PATIENT NAME: Jesús Austin                      PHYSICIAN: DELORIS Laws  : 1950  MRN: [id number]    Subjective   SUBJECTIVE   Referring Provider: Dr. Madera  Reason for Consultation: ESRD on HD  History of present illness:   This is a 70-year-old male with a past medical history significant for hypertension, anemia, ESRD on HD, hypercalcemia with recent finding of skeletal lesions undergoing outpatient evaluation for multiple myeloma who presented to the hospital this morning with complaints of severe pain. Home pain meds were not helping and he was not able to tolerate bone pain and came to ER He was admitted for further management of his pain and nephrology is consulted given his history of ESRD on HD.    Past Medical History:   Diagnosis Date   • Abdominal aortic aneurysm (CMS/HCC)    • Allergic rhinitis    • BPH (benign prostatic hyperplasia)    • Carotid artery stenosis    • Chronic anemia    • Diverticulitis    • ESRD (end stage renal disease) on dialysis (CMS/HCC)    • History of transfusion    • Hypercholesteremia    • Hypertension    • Hypothyroidism    • Malignant tumor of Meckel's diverticulum (CMS/HCC)    • Multiple myeloma (CMS/HCC)    • Nephrolithiasis    • Osteoarthritis    • Rheumatic fever    • Secondary hyperparathyroidism of renal origin (CMS/HCC)    • Stroke (CMS/HCC)      Past Surgical History:   Procedure Laterality Date   • ABDOMINAL AORTIC ANEURYSM REPAIR     • ARTERIOVENOUS FISTULA Left    • ARTERIOVENOUS FISTULA/SHUNT SURGERY Left 2020   • COLONOSCOPY N/A 2021    Procedure: COLONOSCOPY;  Surgeon: Iggy El DO;  Location: [facility] ENDOSCOPY;  Service: Gastroenterology;  Laterality: N/A;  argon to avms right colon   • ENDOSCOPY N/A 2021    Procedure: ESOPHAGOGASTRODUODENOSCOPY;  Surgeon:  "Iggy El DO;  Location: [facility] ENDOSCOPY;  Service: Gastroenterology;  Laterality: N/A;   • INTERVENTIONAL RADIOLOGY PROCEDURE N/A 8/23/2017   • INTERVENTIONAL RADIOLOGY PROCEDURE Left 6/5/2020   • INTERVENTIONAL RADIOLOGY PROCEDURE Left 8/27/2020   • TESTICLE TORSION REPAIR N/A      Family History   Problem Relation Age of Onset   • Heart failure Father      Social History     Tobacco Use   • Smoking status: Never Smoker   • Smokeless tobacco: Never Used   Vaping Use   • Vaping Use: Never used   Substance Use Topics   • Alcohol use: No   • Drug use: No     Allergies:  Dilaudid [hydromorphone hcl] and Tape     REVIEW OF SYSTEMS    Review of Systems   Musculoskeletal:        Bone pain   All other systems reviewed and are negative.      Objective   OBJECTIVE   Vital Signs  Temp:  [98 °F (36.7 °C)] 98 °F (36.7 °C)  Heart Rate:  [64-80] 72  Resp:  [16-18] 18  BP: (157-184)/(76-92) 180/85    Flowsheet Rows      First Filed Value   Admission Height  177.8 cm (70\") Documented at 07/15/2021 0836   Admission Weight  109 kg (240 lb 12.8 oz) Documented at 07/15/2021 0836           No intake/output data recorded.    PHYSICAL EXAM    Physical Exam  Constitutional:       Appearance: He is well-developed.   HENT:      Head: Normocephalic and atraumatic.   Eyes:      Conjunctiva/sclera: Conjunctivae normal.      Pupils: Pupils are equal, round, and reactive to light.   Cardiovascular:      Rate and Rhythm: Normal rate and regular rhythm.   Pulmonary:      Effort: Pulmonary effort is normal.      Breath sounds: Normal breath sounds.   Abdominal:      Palpations: Abdomen is soft.   Musculoskeletal:      Cervical back: Neck supple.      Right lower leg: No edema.      Left lower leg: No edema.   Skin:     General: Skin is warm and dry.   Neurological:      Mental Status: He is alert and oriented to person, place, and time.   Psychiatric:         Mood and Affect: Mood normal.         Behavior: Behavior normal.         RESULTS "   Results Review:    Results from last 7 days   Lab Units 07/15/21  0525   SODIUM mmol/L 135*   POTASSIUM mmol/L 4.2   CHLORIDE mmol/L 96*   CO2 mmol/L 29.0   BUN mg/dL 60*   CREATININE mg/dL 6.32*   CALCIUM mg/dL 10.3   BILIRUBIN mg/dL 0.3   ALK PHOS U/L 67   ALT (SGPT) U/L 7   AST (SGOT) U/L 10   GLUCOSE mg/dL 122*       Estimated Creatinine Clearance: 13.1 mL/min (A) (by C-G formula based on SCr of 6.32 mg/dL (H)).                Results from last 7 days   Lab Units 07/15/21  0525   WBC 10*3/mm3 10.43   HEMOGLOBIN g/dL 7.8*   PLATELETS 10*3/mm3 276              MEDICATIONS    allopurinol, 100 mg, Oral, Daily  amitriptyline, 25 mg, Oral, Nightly  amLODIPine, 10 mg, Oral, Q24H  aspirin, 81 mg, Oral, Daily  folic acid, 1 mg, Oral, Daily  heparin (porcine), 5,000 Units, Subcutaneous, Q8H  hydrALAZINE, 25 mg, Oral, Q8H  levothyroxine, 50 mcg, Oral, Daily  metoprolol succinate XL, 25 mg, Oral, Nightly  Morphine, 2 mg, Intravenous, Once  pantoprazole, 40 mg, Oral, QAM  rosuvastatin, 10 mg, Oral, Nightly  sevelamer, 2,400 mg, Oral, TID With Meals  sodium chloride, 10 mL, Intravenous, Q12H  sodium polystyrene, 15 g, Oral, Daily         (Not in a hospital admission)    Assessment/Plan   ASSESSMENT / PLAN      Metastatic multiple myeloma to bone (CMS/[facility])    ESRF (end stage renal failure) (CMS/[facility])    1.  ESRD on HD- [facility] [place].    Seen on hemodialysis today and tolerating well.     2.  Acute on chronic anemia-hemoglobin currently stable at 7.8     3.  Hypertension- fair control, improving with pain control.     4.  Skeletal lesions/multiple myeloma/ pain - undergoing outpatient evaluation for multiple myeloma, has plans for evaluation at [facility]. Also under Dr Cartwright. Morphine has helped him with pain     5.  CKD-MBD- continue Eric    Thank you for the consult, we will continue to follow this patient.         I discussed the patients findings and my recommendations with  patient      This document has been electronically signed by DELORIS Laws on July 15, 2021 13:54 CDT      For this patient encounter, I have reviewed the Nurse Practitioner's documentation, medical decision making, and treatment plan and personally spent time with the patient. Pt is seen during dialysis on 7/15 at 1230 pm.

## 2021-07-15 NOTE — ED PROVIDER NOTES
Subjective   70-year-old white male with a history of multiple medical problems including end-stage renal disease and multiple myeloma presents to the emergency department with chief complaint of back pain.  Patient complains of severe lower back pain since yesterday.  He takes Norco 7.5 every 6 hours without relief.  He relates the pain makes it hard for him to walk and he relates his legs give out on him due to the pain.  He denies fever, sweats, chills, numbness, or bowel or bladder dysfunction.          Review of Systems   Constitutional: Negative for chills, diaphoresis and fever.   Respiratory: Negative for cough and shortness of breath.    Cardiovascular: Negative for chest pain.   Gastrointestinal: Negative for abdominal pain, nausea and vomiting.   Genitourinary: Negative for dysuria.   Musculoskeletal: Positive for back pain and gait problem. Negative for neck pain.   Neurological: Positive for weakness. Negative for syncope, numbness and headaches.   All other systems reviewed and are negative.      Past Medical History:   Diagnosis Date   • Abdominal aortic aneurysm (CMS/HCC)    • Allergic rhinitis    • BPH (benign prostatic hyperplasia)    • Carotid artery stenosis    • Chronic anemia    • Diverticulitis    • ESRD (end stage renal disease) on dialysis (CMS/HCC)    • History of transfusion    • Hypercholesteremia    • Hypertension    • Hypothyroidism    • Malignant tumor of Meckel's diverticulum (CMS/HCC)    • Multiple myeloma (CMS/HCC)    • Nephrolithiasis    • Osteoarthritis    • Rheumatic fever    • Secondary hyperparathyroidism of renal origin (CMS/HCC)    • Stroke (CMS/HCC)        Allergies   Allergen Reactions   • Dilaudid [Hydromorphone Hcl] Mental Status Change   • Tape Rash     Just plastic tape       Past Surgical History:   Procedure Laterality Date   • ABDOMINAL AORTIC ANEURYSM REPAIR     • ARTERIOVENOUS FISTULA Left    • ARTERIOVENOUS FISTULA/SHUNT SURGERY Left 6/17/2020   • COLONOSCOPY N/A  6/28/2021    Procedure: COLONOSCOPY;  Surgeon: Iggy El DO;  Location: [facility] ENDOSCOPY;  Service: Gastroenterology;  Laterality: N/A;  argon to avms right colon   • ENDOSCOPY N/A 6/28/2021    Procedure: ESOPHAGOGASTRODUODENOSCOPY;  Surgeon: Iggy El DO;  Location: [facility] ENDOSCOPY;  Service: Gastroenterology;  Laterality: N/A;   • INTERVENTIONAL RADIOLOGY PROCEDURE N/A 8/23/2017   • INTERVENTIONAL RADIOLOGY PROCEDURE Left 6/5/2020   • INTERVENTIONAL RADIOLOGY PROCEDURE Left 8/27/2020   • TESTICLE TORSION REPAIR N/A        Family History   Problem Relation Age of Onset   • Heart failure Father        Social History     Socioeconomic History   • Marital status:      Spouse name: Not on file   • Number of children: Not on file   • Years of education: Not on file   • Highest education level: Not on file   Tobacco Use   • Smoking status: Never Smoker   • Smokeless tobacco: Never Used   Vaping Use   • Vaping Use: Never used   Substance and Sexual Activity   • Alcohol use: No   • Drug use: No   • Sexual activity: Not Currently           Objective   Physical Exam  Vitals and nursing note reviewed.   Constitutional:       General: He is not in acute distress.     Appearance: He is not toxic-appearing or diaphoretic.   HENT:      Head: Normocephalic and atraumatic.      Right Ear: External ear normal.      Left Ear: External ear normal.      Nose: Nose normal.      Mouth/Throat:      Mouth: Mucous membranes are moist.      Pharynx: Oropharynx is clear.   Eyes:      Extraocular Movements: Extraocular movements intact.      Conjunctiva/sclera: Conjunctivae normal.      Pupils: Pupils are equal, round, and reactive to light.   Cardiovascular:      Rate and Rhythm: Normal rate and regular rhythm.      Pulses: Normal pulses.      Heart sounds: Normal heart sounds.   Pulmonary:      Effort: Pulmonary effort is normal.      Breath sounds: Normal breath sounds.   Abdominal:      General: Bowel sounds are  normal. There is no distension.      Palpations: Abdomen is soft. There is no mass.      Tenderness: There is no abdominal tenderness. There is no guarding.   Musculoskeletal:      Cervical back: Normal range of motion and neck supple.      Right lower leg: No edema.      Left lower leg: No edema.   Skin:     General: Skin is warm and dry.   Neurological:      Mental Status: He is alert and oriented to person, place, and time.      Sensory: No sensory deficit.      Motor: No weakness.   Psychiatric:         Mood and Affect: Mood normal.         Behavior: Behavior normal.         Procedures           ED Course  ED Course as of Jul 15 0649   Th Jul 15, 2021   0646 Patient complains of severe back pain despite treatment with morphine.  Therefore we will admit him to the hospital for pain control.  Case discussed with the hospitalist Dr. Madera.  He agrees to admit the patient for observation.    [DR]      ED Course User Index  [DR] Jhonathan Valle MD            Labs Reviewed   COMPREHENSIVE METABOLIC PANEL - Abnormal; Notable for the following components:       Result Value    Glucose 122 (*)     BUN 60 (*)     Creatinine 6.32 (*)     Sodium 135 (*)     Chloride 96 (*)     eGFR Non  Amer 9 (*)     All other components within normal limits    Narrative:     GFR Normal >60  Chronic Kidney Disease <60  Kidney Failure <15     URINALYSIS W/ MICROSCOPIC IF INDICATED (NO CULTURE) - Abnormal; Notable for the following components:    Protein,  mg/dL (2+) (*)     All other components within normal limits   CBC WITH AUTO DIFFERENTIAL - Abnormal; Notable for the following components:    RBC 2.40 (*)     Hemoglobin 7.8 (*)     Hematocrit 24.4 (*)     .7 (*)     RDW 18.8 (*)     RDW-SD 70.9 (*)     Neutrophil % 83.3 (*)     Lymphocyte % 7.7 (*)     Eosinophil % 0.0 (*)     Immature Grans % 0.9 (*)     Neutrophils, Absolute 8.70 (*)     Immature Grans, Absolute 0.09 (*)     All other components within normal  limits   URINALYSIS, MICROSCOPIC ONLY - Abnormal; Notable for the following components:    RBC, UA 0-2 (*)     All other components within normal limits   CBC AND DIFFERENTIAL    Narrative:     The following orders were created for panel order CBC & Differential.  Procedure                               Abnormality         Status                     ---------                               -----------         ------                     CBC Auto Differential[[id number]]        Abnormal            Final result                 Please view results for these tests on the individual orders.     XR Bone Survey Limited    Result Date: 7/11/2021  Narrative: EXAM: XR BONE SURVEY LIMITED COMPARISON: None INDICATION: Multiple myeloma, look for lytic lesion, C90.00 Multiple myeloma not having achieved remission FINDINGS: Diffuse mottled appearance of the lower pelvic girdle and bilateral proximal femur. Mottled appearance of the mid to distal bilateral femur. Lucent lesions are seen in the right proximal to mid fibula in the left mid fibula. Mottled appearance of the bilateral clavicle, scapula, and humerus. Mottled/lucent appearance to the right and left proximal radius. Numerous round lucencies are seen throughout the skull, most prominent at the right skull measuring 7 mm. Diffusely mottled appearance of the mandible. Spondylosis and degenerative disc disease of the lumbosacral spine, most significant at L5/S1. Severe spondylosis and degenerative disc disease of the cervical spine.     Impression: Diffusely mottled appearance of the appendicular and axial skeleton bone marrow as described above. This would be consistent with multiple myeloma. Electronically signed by:  Jose Ambriz MD  7/11/2021 10:42 AM CDT Workstation: [id number]    CT Lumbar Spine Without Contrast    Result Date: 7/15/2021  Narrative: CT LUMBAR SPINE Clinical history:back pain, history of multiple myeloma TECHNIQUE:  Helically acquired images were  obtained of the lumbar spine. 2D reformats were reviewed. A radiation dose optimization technique was used for this scan. IV Contrast dosage and agent: [None.] COMPARISON: May 14, 2018 FINDINGS:  [] VERTEBRAE: There is a markedly heterogenous appearance to the vertebral bodies as well as the osseous structures of the pelvis including the sacrum. This was not seen on the prior study. Multiple lytic lesions are noted to small to count. This also involves the posterior elements. No acute fracture. VERTEBRAL ALIGNMENT: [No spondylolisthesis.] [There is preservation of the normal lumbar lordosis.] DISCS: Degenerative discogenic changes are noted.  Decreased disc space L5-S1 similar to prior study. At L4-5 there is a diffuse annular bulge with facet arthrosis and ligamentum flavum hypertrophy. The canal is narrowed to approximately 7 mm at this level.. At the level of L5-S1 there is subarticular recess narrowing. No central canal stenosis INCLUDED RETROPERITONEUM: The endovascular stent graft is again noted within the infrarenal aorta extending into the iliac arteries similar to prior study [There is no retroperitoneal adenopathy.] []     Impression: 1.  Interval development of a markedly heterogenous appearance of the vertebral bodies as well as the osseous structures of the pelvis including the sacrum. Multiple lytic lesions are noted. Findings are consistent with the patient's history of multiple myeloma. 2.  Degenerative discogenic changes of the lumbar spine greatest at L4-5 where there is a diffuse annular bulge with facet arthrosis and ligamentum flavum hypertrophy resulting in mild to moderate central canal stenosis and subarticular recess narrowing. Electronically signed by:  Marielos Pimentel DO  7/15/2021 6:28 AM CDT Workstation: [id number]    XR Chest 1 View    Result Date: 7/15/2021  Narrative: CLINICAL HISTORY: weakness EXAMINATION: XR CHEST 1 VW COMPARISON: 6/20/2021 FINDINGS: There is a lordotic  projection. The heart is enlarged. The pulmonary vascularity is within normal limits.  Small area of left basilar density near the costophrenic sulcus could represent any combination of a small left pleural effusion as well as atelectasis.  No pleural effusion is seen on the right.     Impression: Cardiomegaly. Small left basilar density near the costophrenic sulcus which could represent small left pleural effusion as well as atelectasis. Electronically signed by:  Jose Armando Doty DO  7/15/2021 6:16 AM CDT Workstation: [id number]    XR Chest 1 View    Result Date: 6/20/2021  Narrative: PROCEDURE: XR CHEST 1 VW VIEWS:Single INDICATION: Shortness of breath COMPARISON: CXR: 8/23/2017 FINDINGS:   - lines/tubes: None   - cardiac: Size within normal limits.   - mediastinum: Contour within normal limits.   - lungs: No evidence of a focal air space process and mild predominantly chronic appearing interstitial prominence.   - pleura: Blunting of the left costophrenic angle    - osseous: Unremarkable for age.     Impression: 1. Mild predominantly chronic appearing interstitial prominence. Some degree of superimposed interstitial edema, infectious or inflammatory process cannot be excluded. 2. Minimal blunting of left costophrenic angle which may represent pleural thickening or pleural effusion Electronically signed by:  Monika Nation MD  6/20/2021 6:49 PM CDT Workstation: [id number]    CT Bone marrow biopsy and aspiration    Result Date: 6/22/2021  Narrative: Procedure: CT directed bone marrow aspirate/biopsy. Reason for exam: Abnormal blood work. Anemia. FINDINGS: Patient presents for CT directed bone marrow aspirate/biopsy. Procedure, risks, and benefits were explained to the patient he gave informed written consent. The patient was placed on CT table in prone position. Axial computer tomography sequential imaging was performed of the pelvis. This exam was performed according to our departmental dose optimization program,  which includes automated exposure control, adjustment of the mA and/or KV according to patient size and/or use of iterative reconstruction technique. Appropriate access point for bone marrow aspirate/biopsy was identified. The skin surface was marked. The patient was sterilely prepped and draped and locally anesthetized lidocaine. A skin nick was performed scalpel blade. A bone marrow biopsy needle was placed within the medial left iliac wing under CT guidance. A bone marrow aspirate was obtained as well as biopsy and specimens submitted to pathology. The bone marrow biopsy needle was then removed. Patient tolerated procedure well. No immediate complications.     Impression: 1.  Successful CT directed bone marrow aspirate/biopsy. Electronically signed by:  Dilip Snell MD  6/22/2021 3:00 PM CDT Workstation: [id number]                                    [facility]    Final diagnoses:   Metastatic multiple myeloma to bone (CMS/HCC)       ED Disposition  ED Disposition     ED Disposition Condition Comment    Decision to Admit  Level of Care: Med/Surg [1]   Diagnosis: Metastatic multiple myeloma to bone (CMS/HCC) [825491]   Admitting Physician: LEOLA CERVANTES [[id number]]   Attending Physician: LEOLA CERVANTES [[id number]]            No follow-up provider specified.       Medication List      No changes were made to your prescriptions during this visit.          Jhonathan Valle MD  07/15/21 0644

## 2021-07-15 NOTE — ED NOTES
Patient is at an increased risk for falls. Fall light activated, yellow falls bracelet and yellow non-skid socks placed on patient. Call light within reach and patient instructed to call for assistance. Side rails up x2, bed alarm activated, and gait belt readily accessible.            Chan Escamilla, RN  07/15/21 8192

## 2021-07-15 NOTE — CONSULTS
DATE OF CONSULT: 7/15/2021    REQUESTING SOURCE:  Janet Welsh APRN      REASON FOR CONSULTATION: Multiple myeloma, lytic bone lesion with uncontrolled cancer related pain, anemia      HISTORY OF PRESENT ILLNESS:    70-year-old male with medical problem consisting of hypertension, dyslipidemia, end-stage renal disease on hemodialysis, abdominal aortic aneurysm s/p repair, recent diagnosis of multiple myeloma, anemia,, lytic lesion on bone who was admitted to [facility] on July 15, 2021 with uncontrolled back pain and hip pain despite of taking hydrocodone 7.5 mg every 6 hours at home.  Denies any bleeding.  Denies any new lymph node enlargement.  States pain is somewhat improved after getting intravenous morphine in the hospital.  Denies any new headache or dizziness.        PAST MEDICAL HISTORY:    Past Medical History:   Diagnosis Date   • Abdominal aortic aneurysm (CMS/HCC)    • Allergic rhinitis    • BPH (benign prostatic hyperplasia)    • Carotid artery stenosis    • Chronic anemia    • Diverticulitis    • ESRD (end stage renal disease) on dialysis (CMS/HCC)    • History of transfusion    • Hypercholesteremia    • Hypertension    • Hypothyroidism    • Malignant tumor of Meckel's diverticulum (CMS/HCC)    • Multiple myeloma (CMS/HCC)    • Nephrolithiasis    • Osteoarthritis    • Rheumatic fever    • Secondary hyperparathyroidism of renal origin (CMS/HCC)    • Stroke (CMS/HCC)        PAST SURGICAL HISTORY:  Past Surgical History:   Procedure Laterality Date   • ABDOMINAL AORTIC ANEURYSM REPAIR     • ARTERIOVENOUS FISTULA Left    • ARTERIOVENOUS FISTULA/SHUNT SURGERY Left 6/17/2020   • COLONOSCOPY N/A 6/28/2021    Procedure: COLONOSCOPY;  Surgeon: Iggy El DO;  Location: [facility] ENDOSCOPY;  Service: Gastroenterology;  Laterality: N/A;  argon to avms right colon   • ENDOSCOPY N/A 6/28/2021    Procedure: ESOPHAGOGASTRODUODENOSCOPY;  Surgeon: Iggy El DO;  Location: [facility]  ENDOSCOPY;  Service: Gastroenterology;  Laterality: N/A;   • INTERVENTIONAL RADIOLOGY PROCEDURE N/A 8/23/2017   • INTERVENTIONAL RADIOLOGY PROCEDURE Left 6/5/2020   • INTERVENTIONAL RADIOLOGY PROCEDURE Left 8/27/2020   • TESTICLE TORSION REPAIR N/A        ALLERGIES:    Allergies   Allergen Reactions   • Dilaudid [Hydromorphone Hcl] Mental Status Change   • Tape Rash     Just plastic tape       SOCIAL HISTORY:   Social History     Tobacco Use   • Smoking status: Never Smoker   • Smokeless tobacco: Never Used   Vaping Use   • Vaping Use: Never used   Substance Use Topics   • Alcohol use: No   • Drug use: No       CURRENT MEDICATIONS:    Current Facility-Administered Medications   Medication Dose Route Frequency Provider Last Rate Last Admin   • acetaminophen (TYLENOL) tablet 650 mg  650 mg Oral Q4H PRN Benitez Madera MD       • albumin human 25 % IV SOLN 12.5 g  12.5 g Intravenous PRN Anca Merrill MD       • albuterol sulfate HFA (PROVENTIL HFA;VENTOLIN HFA;PROAIR HFA) inhaler 2 puff  2 puff Inhalation Q4H PRN Benitez Madera MD       • allopurinol (ZYLOPRIM) tablet 100 mg  100 mg Oral Daily Benitez Madera MD       • amitriptyline (ELAVIL) tablet 25 mg  25 mg Oral Nightly Benitez Madera MD       • amLODIPine (NORVASC) tablet 10 mg  10 mg Oral Q24H Benitez Madera MD       • aspirin EC tablet 81 mg  81 mg Oral Daily Benitez Madera MD       • folic acid (FOLVITE) tablet 1 mg  1 mg Oral Daily Benitez Madera MD       • heparin (porcine) 5000 UNIT/ML injection 5,000 Units  5,000 Units Subcutaneous Q8H Benitez Madera MD   5,000 Units at 07/15/21 0958   • heparin (porcine) injection 2,000 Units  2,000 Units Intracatheter PRN Anca Merrill MD       • hydrALAZINE (APRESOLINE) tablet 25 mg  25 mg Oral Q8H Benitez Madera MD   25 mg at 07/15/21 0954   • levothyroxine (SYNTHROID, LEVOTHROID) tablet 50 mcg  50 mcg Oral Daily Benitez Madera MD       • metoprolol succinate XL (TOPROL-XL) 24 hr  tablet 25 mg  25 mg Oral Nightly Benitez Madera MD       • morphine injection 2 mg  2 mg Intravenous Q4H PRN Janet Welsh APRN        And   • naloxone (NARCAN) injection 0.4 mg  0.4 mg Intravenous Q5 Min PRN Janet Welsh APRN       • ondansetron (ZOFRAN) tablet 4 mg  4 mg Oral Q6H PRN Benitez Madera MD   4 mg at 07/15/21 1223   • oxyCODONE (ROXICODONE) immediate release tablet 5 mg  5 mg Oral Q4H PRN Benitez Madera MD   5 mg at 07/15/21 1422   • pantoprazole (PROTONIX) EC tablet 40 mg  40 mg Oral QAM Benitez Madera MD       • rosuvastatin (CRESTOR) tablet 10 mg  10 mg Oral Nightly Benitez Madera MD       • sevelamer (RENVELA) tablet 2,400 mg  2,400 mg Oral TID With Meals Benitez Madera MD   2,400 mg at 07/15/21 0949   • sodium chloride 0.9 % flush 10 mL  10 mL Intravenous Q12H Benitez Madera MD   10 mL at 07/15/21 0950   • sodium chloride 0.9 % flush 10 mL  10 mL Intravenous PRN Benitez Madera MD       • sodium polystyrene (KAYEXALATE) 15 GM/60ML suspension 15 g  15 g Oral Daily Benitez Madera MD   15 g at 07/15/21 0950     Current Outpatient Medications   Medication Sig Dispense Refill   • acetaminophen (TYLENOL) 650 MG 8 hr tablet Take 650 mg by mouth Every 8 (Eight) Hours As Needed for Mild Pain .     • albuterol sulfate  (90 Base) MCG/ACT inhaler INHALE 2 PUFFS INTO THE LUNGS EVERY 4 (FOUR) HOURS AS NEEDED FOR WHEEZING.     • allopurinol (ZYLOPRIM) 100 MG tablet Take 100 mg by mouth Daily.     • amitriptyline (ELAVIL) 25 MG tablet Take 25 mg by mouth Every Night.     • amLODIPine (NORVASC) 10 MG tablet Take 1 tablet by mouth Daily. 30 tablet 0   • aspirin 81 MG EC tablet Take 81 mg by mouth Daily.     • CO ENZYME Q-10 PO Take 100 mg by mouth Daily.     • docusate sodium (COLACE) 100 MG capsule Take 100 mg by mouth 2 (Two) Times a Day.     • fluticasone (FLONASE) 50 MCG/ACT nasal spray 1 spray into the nostril(s) as directed by provider Daily As Needed.     •  folic acid (FOLVITE) 1 MG tablet Take 1 tablet by mouth Daily. 90 tablet 1   • furosemide (LASIX) 80 MG tablet Take 80 mg by mouth Daily. On non dialysis days, MWF and sunday     • hydrALAZINE (APRESOLINE) 25 MG tablet Take 1 tablet by mouth Every 8 (Eight) Hours. 90 tablet 0   • HYDROcodone-acetaminophen (NORCO) 7.5-325 MG per tablet Take 1 tablet by mouth Every 6 (Six) Hours As Needed for Moderate Pain . 60 tablet 0   • levothyroxine (SYNTHROID, LEVOTHROID) 50 MCG tablet Take 50 mcg by mouth Daily.     • lidocaine-prilocaine (EMLA) 2.5-2.5 % cream APPLY SMALL AMOUNT TO ACCESS SITE (AVF) 1 TO 2 HOURS BEFORE DIALYSIS. COVER WITH OCCLUSIVE DRESSING (SARAN WRAP)     • Methoxy PEG-Epoetin Beta (MIRCERA IJ) 225 mcg Every 14 (Fourteen) Days.     • metoprolol succinate XL (TOPROL-XL) 25 MG 24 hr tablet Take 25 mg by mouth Every Night.     • Omega-3 Fatty Acids (FISH OIL) 1000 MG capsule capsule Take 1,200 mg by mouth Every Night.     • omeprazole (priLOSEC) 20 MG capsule Take 20 mg by mouth Daily.     • rosuvastatin (CRESTOR) 10 MG tablet Take 10 mg by mouth Every Night.     • sevelamer (RENVELA) 800 MG tablet Take 2,400 mg by mouth 3 (Three) Times a Day With Meals. Takes 3 pills before every meal     • sodium polystyrene (KAYEXALATE) 15 GM/60ML suspension Take 15 g by mouth Daily.          HOME MEDICATIONS:   No current facility-administered medications on file prior to encounter.     Current Outpatient Medications on File Prior to Encounter   Medication Sig Dispense Refill   • acetaminophen (TYLENOL) 650 MG 8 hr tablet Take 650 mg by mouth Every 8 (Eight) Hours As Needed for Mild Pain .     • albuterol sulfate  (90 Base) MCG/ACT inhaler INHALE 2 PUFFS INTO THE LUNGS EVERY 4 (FOUR) HOURS AS NEEDED FOR WHEEZING.     • allopurinol (ZYLOPRIM) 100 MG tablet Take 100 mg by mouth Daily.     • amitriptyline (ELAVIL) 25 MG tablet Take 25 mg by mouth Every Night.     • amLODIPine (NORVASC) 10 MG tablet Take 1 tablet by  mouth Daily. 30 tablet 0   • aspirin 81 MG EC tablet Take 81 mg by mouth Daily.     • CO ENZYME Q-10 PO Take 100 mg by mouth Daily.     • docusate sodium (COLACE) 100 MG capsule Take 100 mg by mouth 2 (Two) Times a Day.     • fluticasone (FLONASE) 50 MCG/ACT nasal spray 1 spray into the nostril(s) as directed by provider Daily As Needed.     • folic acid (FOLVITE) 1 MG tablet Take 1 tablet by mouth Daily. 90 tablet 1   • furosemide (LASIX) 80 MG tablet Take 80 mg by mouth Daily. On non dialysis days, MWF and sunday     • hydrALAZINE (APRESOLINE) 25 MG tablet Take 1 tablet by mouth Every 8 (Eight) Hours. 90 tablet 0   • HYDROcodone-acetaminophen (NORCO) 7.5-325 MG per tablet Take 1 tablet by mouth Every 6 (Six) Hours As Needed for Moderate Pain . 60 tablet 0   • levothyroxine (SYNTHROID, LEVOTHROID) 50 MCG tablet Take 50 mcg by mouth Daily.     • lidocaine-prilocaine (EMLA) 2.5-2.5 % cream APPLY SMALL AMOUNT TO ACCESS SITE (AVF) 1 TO 2 HOURS BEFORE DIALYSIS. COVER WITH OCCLUSIVE DRESSING (SARAN WRAP)     • Methoxy PEG-Epoetin Beta (MIRCERA IJ) 225 mcg Every 14 (Fourteen) Days.     • metoprolol succinate XL (TOPROL-XL) 25 MG 24 hr tablet Take 25 mg by mouth Every Night.     • Omega-3 Fatty Acids (FISH OIL) 1000 MG capsule capsule Take 1,200 mg by mouth Every Night.     • omeprazole (priLOSEC) 20 MG capsule Take 20 mg by mouth Daily.     • rosuvastatin (CRESTOR) 10 MG tablet Take 10 mg by mouth Every Night.     • sevelamer (RENVELA) 800 MG tablet Take 2,400 mg by mouth 3 (Three) Times a Day With Meals. Takes 3 pills before every meal     • sodium polystyrene (KAYEXALATE) 15 GM/60ML suspension Take 15 g by mouth Daily.         FAMILY HISTORY:    Family History   Problem Relation Age of Onset   • Heart failure Father        REVIEW OF SYSTEMS:        CONSTITUTIONAL:  Complains of fatigue. Denies any fever, chills or weight loss.     EYES: No visual disturbances. No discharge. No new lesions    ENMT:  No epistaxis, mouth  "sores or difficulty swallowing.    RESPIRATORY: Positive for shortness of breath. No new cough or hemoptysis.    CARDIOVASCULAR:  No chest pain or palpitations.    GASTROINTESTINAL:  No abdominal pain nausea, vomiting or blood in the stool.    GENITOURINARY: No Dysuria or Hematuria.    MUSCULOSKELETAL: Complains of uncontrolled back pain and hip pain.  Positive for arthritis.    LYMPHATICS:  Denies any abnormal swollen glands anywhere in the body.    NEUROLOGICAL : Positive for tingling and numbness affecting bilateral feet. No headache or dizziness. No seizures or balance problems.    SKIN: No new skin lesions.    ENDOCRINE : No new hot or cold intolerance. No new polyuria . No polydipsia.        PHYSICAL EXAMINATION:      VITAL SIGNS:  /89 (BP Location: Right arm, Patient Position: Sitting)   Pulse 74   Temp 98 °F (36.7 °C) (Oral)   Resp 18   Ht 177.8 cm (70\")   Wt 109 kg (240 lb 12.8 oz)   SpO2 94%   BMI 34.55 kg/m²       07/15/21  0836   Weight: 109 kg (240 lb 12.8 oz)           CONSTITUTIONAL:  Not in any distress.    EYES: Mild conjunctival Pallor. No Icterus. No Pterygium. Extraocular Movements intact.No ptosis.    ENMT:  Normocephalic, Atraumatic.No Facial Asymmetry noted.    NECK:  No adenopathy.Trachea midline. NO JVD.    RESPIRATORY: Diminished air entry at bilateral base. No rhonchi or wheezing.Fair respiratory effort.    CARDIOVASCULAR:  S1, S2. Regular rate and rhythm. No murmur or gallop appreciated.    ABDOMEN:  Soft, obese, nontender. Bowel sounds present in all four quadrants.  No Hepatosplenomegaly appreciated.    MUSCULOSKELETAL:  No edema.No Calf Tenderness.Decreased range of motion.    NEUROLOGIC:    No  Motor  deficit appreciated. Cranial Nerves 2-12 grossly intact.    SKIN : No new skin lesion identified. Skin is warm and dry to touch.    LYMPHATICS: No new enlarged lymph nodes in neck or supraclavicular area.    PSYCHIATRY: Alert, awake and oriented ×3.              DIAGNOSTIC " DATA:    WBC   Date Value Ref Range Status   07/15/2021 10.43 3.40 - 10.80 10*3/mm3 Final     RBC   Date Value Ref Range Status   07/15/2021 2.40 (L) 4.14 - 5.80 10*6/mm3 Final     Hemoglobin   Date Value Ref Range Status   07/15/2021 7.8 (L) 13.0 - 17.7 g/dL Final     Hematocrit   Date Value Ref Range Status   07/15/2021 24.4 (L) 37.5 - 51.0 % Final     MCV   Date Value Ref Range Status   07/15/2021 101.7 (H) 79.0 - 97.0 fL Final     MCH   Date Value Ref Range Status   07/15/2021 32.5 26.6 - 33.0 pg Final     MCHC   Date Value Ref Range Status   07/15/2021 32.0 31.5 - 35.7 g/dL Final     RDW   Date Value Ref Range Status   07/15/2021 18.8 (H) 12.3 - 15.4 % Final     RDW-SD   Date Value Ref Range Status   07/15/2021 70.9 (H) 37.0 - 54.0 fl Final     MPV   Date Value Ref Range Status   07/15/2021 10.3 6.0 - 12.0 fL Final     Platelets   Date Value Ref Range Status   07/15/2021 276 140 - 450 10*3/mm3 Final     Neutrophil %   Date Value Ref Range Status   07/15/2021 83.3 (H) 42.7 - 76.0 % Final     Lymphocyte %   Date Value Ref Range Status   07/15/2021 7.7 (L) 19.6 - 45.3 % Final     Monocyte %   Date Value Ref Range Status   07/15/2021 8.0 5.0 - 12.0 % Final     Eosinophil %   Date Value Ref Range Status   07/15/2021 0.0 (L) 0.3 - 6.2 % Final     Basophil %   Date Value Ref Range Status   07/15/2021 0.1 0.0 - 1.5 % Final     Immature Grans %   Date Value Ref Range Status   07/15/2021 0.9 (H) 0.0 - 0.5 % Final     Neutrophils, Absolute   Date Value Ref Range Status   07/15/2021 8.70 (H) 1.70 - 7.00 10*3/mm3 Final     Lymphocytes, Absolute   Date Value Ref Range Status   07/15/2021 0.80 0.70 - 3.10 10*3/mm3 Final     Monocytes, Absolute   Date Value Ref Range Status   07/15/2021 0.83 0.10 - 0.90 10*3/mm3 Final     Eosinophils, Absolute   Date Value Ref Range Status   07/15/2021 0.00 0.00 - 0.40 10*3/mm3 Final     Basophils, Absolute   Date Value Ref Range Status   07/15/2021 0.01 0.00 - 0.20 10*3/mm3 Final     Immature  Grans, Absolute   Date Value Ref Range Status   07/15/2021 0.09 (H) 0.00 - 0.05 10*3/mm3 Final     nRBC   Date Value Ref Range Status   07/15/2021 0.0 0.0 - 0.2 /100 WBC Final     Glucose   Date Value Ref Range Status   07/15/2021 122 (H) 65 - 99 mg/dL Final     Sodium   Date Value Ref Range Status   07/15/2021 135 (L) 136 - 145 mmol/L Final     Potassium   Date Value Ref Range Status   07/15/2021 4.2 3.5 - 5.2 mmol/L Final     CO2   Date Value Ref Range Status   07/15/2021 29.0 22.0 - 29.0 mmol/L Final     Chloride   Date Value Ref Range Status   07/15/2021 96 (L) 98 - 107 mmol/L Final     Anion Gap   Date Value Ref Range Status   07/15/2021 10.0 5.0 - 15.0 mmol/L Final     Creatinine   Date Value Ref Range Status   07/15/2021 6.32 (H) 0.76 - 1.27 mg/dL Final     BUN   Date Value Ref Range Status   07/15/2021 60 (H) 8 - 23 mg/dL Final     BUN/Creatinine Ratio   Date Value Ref Range Status   07/15/2021 9.5 7.0 - 25.0 Final     Calcium   Date Value Ref Range Status   07/15/2021 10.3 8.6 - 10.5 mg/dL Final     eGFR Non  Amer   Date Value Ref Range Status   07/15/2021 9 (L) >60 mL/min/1.73 Final     Comment:     <15 Indicative of kidney failure.     Alkaline Phosphatase   Date Value Ref Range Status   07/15/2021 67 39 - 117 U/L Final     Total Protein   Date Value Ref Range Status   07/15/2021 8.3 6.0 - 8.5 g/dL Final     ALT (SGPT)   Date Value Ref Range Status   07/15/2021 7 1 - 41 U/L Final     AST (SGOT)   Date Value Ref Range Status   07/15/2021 10 1 - 40 U/L Final     Total Bilirubin   Date Value Ref Range Status   07/15/2021 0.3 0.0 - 1.2 mg/dL Final     Albumin   Date Value Ref Range Status   07/15/2021 3.70 3.50 - 5.20 g/dL Final     Globulin   Date Value Ref Range Status   07/15/2021 4.6 gm/dL Final     Lab Results   Component Value Date    IRON 71 07/07/2021    TIBC 297 (L) 07/07/2021    LABIRON 24 07/07/2021    FERRITIN 691.00 (H) 07/07/2021    [id number] >2,000 (H) 06/08/2021    FOLATE 5.97  06/08/2021     No results found for: , LABCA2, AFPTM, HCGQUANT, , CHROMGRNA, [id number], CEA, REFLABREPO]            Radiology Data :  XR Bone Survey Limited    Result Date: 7/11/2021  Diffusely mottled appearance of the appendicular and axial skeleton bone marrow as described above. This would be consistent with multiple myeloma. Electronically signed by:  Jose Ambriz MD  7/11/2021 10:42 AM CDT Workstation: [id number]    CT Lumbar Spine Without Contrast    Result Date: 7/15/2021  1.  Interval development of a markedly heterogenous appearance of the vertebral bodies as well as the osseous structures of the pelvis including the sacrum. Multiple lytic lesions are noted. Findings are consistent with the patient's history of multiple myeloma. 2.  Degenerative discogenic changes of the lumbar spine greatest at L4-5 where there is a diffuse annular bulge with facet arthrosis and ligamentum flavum hypertrophy resulting in mild to moderate central canal stenosis and subarticular recess narrowing. Electronically signed by:  Marielos Pimentel DO  7/15/2021 6:28 AM CDT Workstation: [id number]    XR Chest 1 View    Result Date: 7/15/2021  Cardiomegaly. Small left basilar density near the costophrenic sulcus which could represent small left pleural effusion as well as atelectasis. Electronically signed by:  Jose Armando Doty DO  7/15/2021 6:16 AM CDT Workstation: [id number]      Pathology :  * Cannot find OR log *      ASSESSMENT AND PLAN:      1.  Multiple myeloma  -Recent bone marrow biopsy showed 35% of plasma cell patient does have underlying anemia and lytic lesion on the further work-up consistent with myeloma  -On July 7, 2021 patient was not sure whether he wants to do chemotherapy or not  -After hospital admission today with uncontrolled pain, patient now is thinking about trying chemotherapy.  -Discussed with patient and his wife if he wants to try chemotherapy we can arrange and start chemotherapy  starting next week prior to his appointment at [place].  -We will follow up on that decision tomorrow  -In the interim recommend starting pulse dexamethasone at 20 mg p.o. daily for 3 days starting today.    2.  Anemia:  -Multifactorial  -Hemoglobin is 7.8  -Recommend transfusing as needed if hemoglobin is less than 7    3.  Back pain and hip pain:  -CT of lumbar spine today shows lytic lesion of affecting hip and lumbar spine.  Patient does have multiple lytic lesion on recent a skeletal survey which was discussed with patient  -Patient was on [place] 7.5 as outpatient without any adequate pain control  -Recommend changing pain regimen to morphine 15 mg twice daily along with oxycodone 10 mg every 4 hours  -We will start patient on bowel regimen with stool softener and MiraLAX as required  -Patient has required 3 doses of intravenous morphine since yesterday.    4.  Hypertension    5.  Dyslipidemia    6.  End-stage renal disease on hemodialysis          Thank you for this consultation.    Vern Cartwright MD  7/15/2021  17:00 CDT        Part of this note may be an electronic transcription/translation of spoken language to printed text using the Dragon Dictation System.        Addendum:  -Due to his end-stage renal disease long-acting morphine has been contraindicated.  -Instead of morphine sulfate every 12 hours, we will start him on a fentanyl 12 mcg patch every 72 hours.  Recommendation were discussed with patient's wife and patient.

## 2021-07-15 NOTE — ED TRIAGE NOTES
Pt here with lower back pain for an extended period, pt has multiple myeloma and lesions from his head down to all his long bones and pelvis.  Pt is dialysis patient that  Tuesday Thursday Saturday. Per wife he is becoming weaker, his leg are going out more often, he sees a dr in [place], pcp is dr. Orourke, juan is the nephrologist.

## 2021-07-15 NOTE — PROGRESS NOTES
[facility]  INPATIENT PROGRESS NOTE    Length of Stay: 0  Date of Admission: 7/15/2021  Primary Care Physician: Luis Miguel Orourke MD    Subjective   Chief Complaint: No complaints    HPI: This is a 70-year-old male with past medical history of hypertension, dyslipidemia, end-stage renal disease on hemodialysis (T, TH & Sat), AAA (status post repair) and multiple myeloma that presented to [facility] on 7/15/2021 with complaints of increasing back pain.  The patient was recently diagnosed with multiple myeloma with CT scan showing sclerosis and lytic lesion.  The patient follows with Dr. Cartwright at the [facility].  He is scheduled to undergo second opinion with Dr. Pleitez at [place] during the last week of July.      Review of Systems   Constitutional: Negative for activity change and fatigue.   HENT: Negative for ear pain and sore throat.    Eyes: Negative for pain and discharge.   Respiratory: Negative for cough and shortness of breath.    Cardiovascular: Negative for chest pain and palpitations.   Gastrointestinal: Negative for abdominal pain and nausea.   Endocrine: Negative for cold intolerance and heat intolerance.   Genitourinary: Negative for difficulty urinating and dysuria.   Musculoskeletal: Positive for back pain. Negative for arthralgias and gait problem.   Skin: Negative for color change and rash.   Neurological: Negative for dizziness and weakness.   Psychiatric/Behavioral: Negative for agitation and confusion.        Objective    Temp:  [98 °F (36.7 °C)] 98 °F (36.7 °C)  Heart Rate:  [64-80] 72  Resp:  [16-18] 18  BP: (157-184)/(76-92) 180/85    Physical Exam  Constitutional:       Appearance: He is well-developed.   HENT:      Head: Normocephalic and atraumatic.   Eyes:      Pupils: Pupils are equal, round, and reactive to light.   Cardiovascular:      Rate and Rhythm: Normal rate and regular rhythm.   Pulmonary:      Effort:  Pulmonary effort is normal.      Breath sounds: Normal breath sounds.   Abdominal:      General: Bowel sounds are normal.      Palpations: Abdomen is soft.   Musculoskeletal:         General: Normal range of motion.      Cervical back: Normal range of motion and neck supple.   Skin:     General: Skin is warm and dry.   Neurological:      Mental Status: He is alert and oriented to person, place, and time.   Psychiatric:         Behavior: Behavior normal.       Results Review:  I have reviewed the labs, radiology results, and diagnostic studies.    Laboratory Data:   Results from last 7 days   Lab Units 07/15/21  0525   SODIUM mmol/L 135*   POTASSIUM mmol/L 4.2   CHLORIDE mmol/L 96*   CO2 mmol/L 29.0   BUN mg/dL 60*   CREATININE mg/dL 6.32*   GLUCOSE mg/dL 122*   CALCIUM mg/dL 10.3   BILIRUBIN mg/dL 0.3   ALK PHOS U/L 67   ALT (SGPT) U/L 7   AST (SGOT) U/L 10   ANION GAP mmol/L 10.0     Estimated Creatinine Clearance: 13.1 mL/min (A) (by C-G formula based on SCr of 6.32 mg/dL (H)).          Results from last 7 days   Lab Units 07/15/21  0525   WBC 10*3/mm3 10.43   HEMOGLOBIN g/dL 7.8*   HEMATOCRIT % 24.4*   PLATELETS 10*3/mm3 276           Culture Data:   No results found for: BLOODCX  No results found for: URINECX  No results found for: RESPCX  No results found for: WOUNDCX  No results found for: STOOLCX  No components found for: BODYFLD    Radiology Data:   Imaging Results (Last 24 Hours)     Procedure Component Value Units Date/Time    CT Lumbar Spine Without Contrast [[id number]] Collected: 07/15/21 0537     Updated: 07/15/21 0629    Narrative:      CT LUMBAR SPINE    Clinical history:back pain, history of multiple myeloma    TECHNIQUE:    Helically acquired images were obtained of the lumbar spine. 2D  reformats were reviewed. A radiation dose optimization technique  was used for this scan.  IV Contrast dosage and agent: [None.]    COMPARISON: May 14, 2018    FINDINGS:      []  VERTEBRAE: There is a markedly  heterogenous appearance to the  vertebral bodies as well as the osseous structures of the pelvis  including the sacrum. This was not seen on the prior study.  Multiple lytic lesions are noted to small to count. This also  involves the posterior elements. No acute fracture.  VERTEBRAL ALIGNMENT: [No spondylolisthesis.]   [There is preservation of the normal lumbar lordosis.]  DISCS: Degenerative discogenic changes are noted.  Decreased disc  space L5-S1 similar to prior study. At L4-5 there is a diffuse  annular bulge with facet arthrosis and ligamentum flavum  hypertrophy. The canal is narrowed to approximately 7 mm at this  level.. At the level of L5-S1 there is subarticular recess  narrowing. No central canal stenosis  INCLUDED RETROPERITONEUM: The endovascular stent graft is again  noted within the infrarenal aorta extending into the iliac  arteries similar to prior study  [There is no retroperitoneal adenopathy.]   []      Impression:      1.  Interval development of a markedly heterogenous appearance of  the vertebral bodies as well as the osseous structures of the  pelvis including the sacrum. Multiple lytic lesions are noted.  Findings are consistent with the patient's history of multiple  myeloma.  2.  Degenerative discogenic changes of the lumbar spine greatest  at L4-5 where there is a diffuse annular bulge with facet  arthrosis and ligamentum flavum hypertrophy resulting in mild to  moderate central canal stenosis and subarticular recess  narrowing.    Electronically signed by:  Marielos Pimentel DO  7/15/2021 6:28  AM CDT Workstation: [id number]    XR Chest 1 View [[id number]] Collected: 07/15/21 0540     Updated: 07/15/21 0618    Narrative:      CLINICAL HISTORY:  weakness    EXAMINATION:  XR CHEST 1 VW    COMPARISON:  6/20/2021    FINDINGS:  There is a lordotic projection. The heart is enlarged. The  pulmonary vascularity is within normal limits.  Small area of  left basilar density near the  costophrenic sulcus could represent  any combination of a small left pleural effusion as well as  atelectasis.  No pleural effusion is seen on the right.      Impression:      Cardiomegaly. Small left basilar density near the costophrenic  sulcus which could represent small left pleural effusion as well  as atelectasis.     Electronically signed by:  Jose Armando Doty DO  7/15/2021 6:16 AM CDT  Workstation: [id number]          I have reviewed the patient's current medications.     Assessment/Plan     Active Hospital Problems    Diagnosis  POA   • **Metastatic multiple myeloma to bone (CMS/HCC) [C90.00]  Yes   • ESRF (end stage renal failure) (CMS/HCC) [N18.6]  Yes       Plan:     1.  Back pain secondary to multiple myeloma: Oncology consult appreciated.  2.  Stage renal disease on hemodialysis: Nephrology consult appreciated.  3.  Hypertension: Continue home amlodipine, hydralazine and metoprolol.  4.  Anemia, chronic: Hemoglobin today is 7.8.  We will continue to monitor.  5.  Hypothyroidism: Continue home Synthroid.  6.  Hyperlipidemia: Continue home statin.        Discharge Planning: I expect patient to be discharged to home in 1-2 days.    I confirmed that the patient's Advance Care Plan is present, code status is documented, or surrogate decision maker is listed in the patient's medical record.      I have utilized all available immediate resources to obtain, update, or review the patient's current medications.         This document has been electronically signed by DELORIS Sharp on July 15, 2021 13:10 CDT

## 2021-07-15 NOTE — ED NOTES
3 attempts made to give report. Charge nurse notified.        Yazmin Thomason, RN  07/15/21 1040

## 2021-07-16 NOTE — PROGRESS NOTES
"[facility]  [address] [place] [address]. [address]   - [phone]  F - [phone]     Progress Note          PATIENT  DEMOGRAPHICS   PATIENT NAME: Jesús Austin                      PHYSICIAN: Anca Merrill MD  : 1950  MRN: [id number]   LOS: 0 days    Patient Care Team:  Luis Miguel Orourke MD as PCP - General (Family Medicine)  Vern Cartwright MD as Consulting Physician (Hematology and Oncology)  Subjective   SUBJECTIVE   Pain improves with meds but then once med effect wears off it comes back.  No soa         Objective   OBJECTIVE   Vital Signs  Temp:  [96.4 °F (35.8 °C)-97.5 °F (36.4 °C)] 97 °F (36.1 °C)  Heart Rate:  [63-87] 63  Resp:  [16-18] 18  BP: (124-190)/(60-89) 130/60    Flowsheet Rows      First Filed Value   Admission Height  177.8 cm (70\") Documented at 07/15/2021 0836   Admission Weight  109 kg (240 lb 12.8 oz) Documented at 07/15/2021 0836           I/O last 3 completed shifts:  In: 240 [P.O.:240]  Out: 50 [Urine:50]    PHYSICAL EXAM    Physical Exam  Constitutional:       Appearance: He is well-developed.   HENT:      Head: Normocephalic.   Eyes:      Pupils: Pupils are equal, round, and reactive to light.   Cardiovascular:      Rate and Rhythm: Normal rate and regular rhythm.      Heart sounds: Normal heart sounds.   Pulmonary:      Effort: Pulmonary effort is normal.      Breath sounds: Normal breath sounds.   Abdominal:      General: Abdomen is flat. Bowel sounds are normal.      Palpations: Abdomen is soft.   Musculoskeletal:         General: No swelling.   Neurological:      General: No focal deficit present.      Mental Status: He is alert and oriented to person, place, and time.         RESULTS   Results Review:    Results from last 7 days   Lab Units 21  0618 07/15/21  0525   SODIUM mmol/L 131* 135*   POTASSIUM mmol/L 4.2 4.2   CHLORIDE mmol/L 94* 96*   CO2 mmol/L 29.0 29.0   BUN mg/dL 38* 60*   CREATININE mg/dL 4.52* 6.32*   CALCIUM mg/dL 10.5 " 10.3   BILIRUBIN mg/dL  --  0.3   ALK PHOS U/L  --  67   ALT (SGPT) U/L  --  7   AST (SGOT) U/L  --  10   GLUCOSE mg/dL 84 122*       Estimated Creatinine Clearance: 18.4 mL/min (A) (by C-G formula based on SCr of 4.52 mg/dL (H)).                Results from last 7 days   Lab Units 07/16/21  0618 07/15/21  0525   WBC 10*3/mm3 7.51 10.43   HEMOGLOBIN g/dL 8.7* 7.8*   PLATELETS 10*3/mm3 284 276               Imaging Results (Last 24 Hours)     ** No results found for the last 24 hours. **           MEDICATIONS    allopurinol, 100 mg, Oral, Daily  amitriptyline, 25 mg, Oral, Nightly  amLODIPine, 10 mg, Oral, Q24H  aspirin, 81 mg, Oral, Daily  fentaNYL, 1 patch, Transdermal, Q72H   And  Check Fentanyl Patch Placement, 1 each, Does not apply, Q12H  docusate sodium, 100 mg, Oral, BID  folic acid, 1 mg, Oral, Daily  heparin (porcine), 5,000 Units, Subcutaneous, Q8H  hydrALAZINE, 25 mg, Oral, Q8H  levothyroxine, 50 mcg, Oral, Daily  metoprolol succinate XL, 25 mg, Oral, Nightly  pantoprazole, 40 mg, Oral, QAM  rosuvastatin, 10 mg, Oral, Nightly  sevelamer, 2,400 mg, Oral, TID With Meals  sodium chloride, 10 mL, Intravenous, Q12H  sodium polystyrene, 15 g, Oral, Daily           Assessment/Plan   ASSESSMENT / PLAN      Metastatic multiple myeloma to bone (CMS/HCC)    ESRF (end stage renal failure) (CMS/[facility])    1.  ESRD on HD- [facility] [place]. hd again tomorrow    2.  Acute on chronic anemia-hemoglobin currently stable at 8.7     3.  Hypertension- fair control, improving with pain control.     4.  Skeletal lesions/multiple myeloma/ pain - undergoing outpatient evaluation for multiple myeloma, has plans for evaluation at [place]. Also under Dr Cartwright. Morphine has helped him with pain. Now on fentanyl     5.  CKD-MBD- continue Renvela                This document has been electronically signed by Anca Merrill MD on July 16, 2021 10:29 CDT

## 2021-07-16 NOTE — PLAN OF CARE
Goal Outcome Evaluation:  Plan of Care Reviewed With: patient, spouse           Outcome Summary: PT edilma completed w/ OT; Pt up in chair and does not caitlin supine so spine protection done in sitting by applying LSO per TO's from DELORIS Hernandez. Pt has rib belt at home wife felt will help pt w/ LSO which we will try this pm. Pt felt LSO may help some now. Pt had extreme difficulty w/ sit to stand w/ max assist of 2 unable to extent hips to clear seat. Wife and PT discussed DME needs to include w/chair, tall or upright RW and ramp for home as his fall & fracture risk appears very high. Will follow up this pm w/ rib belt and re-attempt standing w/ spouse. PT will need 24/7 care, marlene DME to allow him w/chair mobility and limited gait at best at home, and concerns for ability to ride in car to [facility] next week for consult.  To cont follow up and work w/ pt to assist in their goal to go to [employer] for opinion on his current condition.   no

## 2021-07-16 NOTE — PLAN OF CARE
Goal Outcome Evaluation:  Plan of Care Reviewed With: patient, spouse        Progress: improving  Outcome Summary: Pt and wife assisted in applying LSO and rib belt for thoracic area. He reported using both helped w/ sit to stand and gait. He was able to transfer to/from bsc, then later walk ~88 ft w/ FWRW till he sat in chair which we used to follow him with in gait. His pain was 6/10 post gait and returned to chair w; egg crate & pillows to decrease risk hyperext at spine in sitting. Wife has arranged ramp installed at home which will help for transfers in/out of home. Recommend w/chair w/ w/chair cushion and tall RW for him for gait. He deferred trying to get in/out of bed this visit but he now has bsc and gait to use for off loading spine and buttocks.

## 2021-07-16 NOTE — THERAPY EVALUATION
Acute Care - Physical Therapy Initial Evaluation And Treatment  [facility]     Patient Name: Jesús Austin  : 1950  MRN: [id number]  Today's Date: 2021           PT Assessment (last 12 hours)      PT Evaluation and Treatment     Row Name 21 1421 21       Physical Therapy Time and Intention    Subjective Information  complains of;pain  -GB  --    Document Type  therapy note (daily note)  -GB  evaluation  -GB    Mode of Treatment  individual therapy;physical therapy  -GB  co-treatment;physical therapy;occupational therapy  -GB    Session Not Performed  patient unavailable for evaluation  -GB  --    Patient Effort  excellent  -GB  --    Row Name 21 14221       General Information    Patient Profile Reviewed  yes  -GB  yes  -GB    Prior Level of Function  --  independent:;min assist:;community mobility;gait  -GB    Existing Precautions/Restrictions  fall;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; 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  -GB  fall;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; 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  -GB    Barriers to Rehab  medically complex  -GB  medically complex  -GB    Row Name 21 09          Living Environment    Lives With  spouse  -GB     Row Name 21 09          Home Main Entrance    Number of Stairs, Main Entrance  two  -GB     Row Name 21 09          Stairs Within Home, Primary    Number of Stairs, Within Home, Primary  two  -GB     Row Name 21 09          Home Use of Assistive/Adaptive Equipment    Equipment Currently Used at Home  rollator  -GB     Row Name 21 14221 09       Sensory    Hearing Status  WFL  -GB  WFL  -GB    Row Name 21 09          Sensory Assessment (Somatosensory)    Sensory Assessment (Somatosensory)  LE sensation intact  -GB     Row Name 21 14221 09       Cognition    Orientation Status (Cognition)  oriented x 4  -GB  oriented x 4  -GB    Row Name 21 09       Pain  Scale: Numbers Pre/Post-Treatment    Pretreatment Pain Rating  4/10  -GB  10/10  -GB    Posttreatment Pain Rating  6/10  -GB  10/10  -GB    Pain Location - Orientation  lower  -GB  lower  -GB    Pain Location  back  -GB  back  -    Row Name 07/16/21 1421 07/16/21 0922       Pain Scale: Word Pre/Post-Treatment    Pain Intervention(s)  Repositioned;Ambulation/increased activity  -GB  Repositioned;Other (Comment) applied LSO, repositioned in chair to decrease hyperextensio  -    Row Name 07/16/21 1421 07/16/21 0922       Range of Motion Comprehensive    General Range of Motion  no range of motion deficits identified;bilateral upper extremity ROM WFL  -GB  no range of motion deficits identified;bilateral upper extremity ROM WFL  -GB    Row Name 07/16/21 1421 07/16/21 0922       Strength Comprehensive (MMT)    General Manual Muscle Testing (MMT) Assessment  other (see comments)  -  other (see comments)  -    Comment, General Manual Muscle Testing (MMT) Assessment  --  Martin knee ext 4-/5, DF/PF martin 4/5; hip flx/ext at least 3+-4-/5 hip; during sit to stand there was no palpable initiation of quads to stimulate for standing; could not come to full hip ext despite max assist of 2 and push off from walker  -    Row Name 07/16/21 0922          Bed Mobility    Comment (Bed Mobility)  does not like bed & was up in chair all night  -     Row Name 07/16/21 1421 07/16/21 0922       Transfers    Transfers  sit-stand transfer;toilet transfer  -  sit-stand transfer  -    Comment (Transfers)  --  cannot complete full stand, hip ext not accomplished   -    Sit-Stand [place] (Transfers)  moderate assist (50% patient effort);2 person assist  -GB  maximum assist (25% patient effort);2 person assist  -GB    [place] Level (Toilet Transfer)  contact guard;minimum assist (75% patient effort);2 person assist  -GB  --    Assistive Device (Toilet Transfer)  commode, bedside without drop arms;walker, front-wheeled  -   --    Row Name 07/16/21 1421 07/16/21 0922       Sit-Stand Transfer    Assistive Device (Sit-Stand Transfers)  walker, front-wheeled  -GB  walker, front-wheeled  -GB    Row Name 07/16/21 1421          Toilet Transfer    Type (Toilet Transfer)  stand pivot/stand step  -GB     Row Name 07/16/21 1421 07/16/21 0922       Gait/Stairs (Locomotion)    Gait/Stairs Locomotion  gait/ambulation independence;gait/ambulation assistive device;distance ambulated;gait pattern;gait deviations  -GB  --    [place] Level (Gait)  contact guard;1 person assist  -GB  unable to assess;not tested  -GB    Assistive Device (Gait)  walker, front-wheeled  -GB  --    Distance in Feet (Gait)  3,3,88  -GB  did not come to full stand despite LSO and max assist of 2 for sit to stand;   -GB    Pattern (Gait)  step-through  -GB  --    Deviations/Abnormal Patterns (Gait)  antalgic;gayla decreased;gait speed decreased;stride length decreased  -GB  --    Comment (Gait/Stairs)  we focused on keeping back up straight which included him pulling up on walker while we stabilized it w/ feet. He then stood, coached him to roll not lift walker and walk. He reported it felt better to get up this pm but still had pain w/ activity but glad to get up out of that chair; wife concerned he will get pressure sores on buttocks  -  wife states he stopped ability to walk this past wed [place] in store and restaurant. He became very difficult for her to get up or walk him w/ her assist; Once home she had trouble getting him up as well   -    Row Name 07/16/21 1421 07/16/21 0922       Safety Issues, Functional Mobility    Safety Issues Affecting Function (Mobility)  --  awareness of need for assistance  -    Impairments Affecting Function (Mobility)  balance;endurance/activity tolerance;strength;postural/trunk control;pain  -GB  balance;endurance/activity tolerance;strength;postural/trunk control;pain  -    Row Name 07/16/21 0922           Balance    Balance Assessment  sit to stand dynamic balance  -GB     Sit to Stand Dynamic Balance  severe impairment;supported;unable to perform activity  -GB     Comment, Balance  pain and weakness appear to be issue for pt in his lack of ability to stand from chair;   -GB     Row Name 07/16/21 1300          Orthotics & Prosthetics Management    Orthosis Location  spinal orthosis  -GB     Additional Documentation  Orthosis Location (Row)  -GB     Row Name 07/16/21 1421 07/16/21 1300       Spinal Orthosis Management    Type (Spinal Orthosis)  LSO (lumbar sacral orthosis);other (see comments)  -GB  LSO (lumbar sacral orthosis)  -GB    Fabrication Comment (Spinal Orthosis)  added his rib belt from home for thoracic area which he reported felt better  -GB  TLSO not available but spouse says his rib belt helped so they consented to LSO w/ use of rib belt till TLSO can be ordered/fitted  -GB    Functional Design (Spinal Orthosis)  static orthosis  -GB  static orthosis  -GB    Therapeutic Indications Spinal Orthosis  deformity prevention/reduction;pain management  -GB  deformity prevention/reduction;pain management  -GB    Wearing Schedule (Spinal Orthosis)  wear full-time;wear when out of bed only;wear with activity/work  -GB  wear with activity/work  -GB    Orthosis Training (Spinal Orthosis)  patient and caregiver;activity limitations/precautions;donning/doffing orthosis;orthosis adjustment;other (see comments) wife was RN w/ us on ARU & aware of pressure risks  -GB  patient;caregiver  -GB    Compliance/Wearing Issues (Spinal Orthosis)  --  patient/caregiver comprehend strategies  -GB    Sensory Assessment (Spinal Orthosis)  --  pt able to localize where pain is and reported LSO provided some support  -GB    Skin Assessment (Spinal Orthosis)  he/wife applied his rib belt then wife and i adjusted LSO lower on back to comfort then added gait belt for gait. He reported this helped & we saw improvement w/ sit to stand and  gait. He loosened braces post gait in sitting to comfort. We padded seat w/ egg crate and added pillows behind back to keep spine more erect rather than hyperextended; He reported comfort w/ this;   -GB  --    Row Name 07/16/21 1421          Respiratory WDL    Respiratory WDL  ex;breath sounds  -GB     Rhythm/Pattern, Respiratory  shortness of breath with exertion   -GB     Row Name 07/16/21 1421          Breath Sounds    Breath Sounds  All Fields  -GB     All Lung Fields Breath Sounds  Anterior:;Lateral:;diminished;clear  -GB     Row Name 07/16/21 1421          Skin WDL    Skin WDL  ex;all pt refused full assessment, denies wounds   -GB     Skin Color/Characteristics  without discoloration  -GB     Skin Temperature  warm  -GB     Skin Moisture  dry  -GB     Row Name 07/16/21 1421          Coping    Observed Emotional State  calm;cooperative  -GB     Family/Support Persons  spouse  -GB     Involvement in Care  not present at bedside  -GB     Row Name 07/16/21 1421 07/16/21 1313       Plan of Care Review    Plan of Care Reviewed With  patient;spouse  -GB  --  -GB    Outcome Summary  Pt and wife assisted in applying LSO and rib belt for thoracic area. He reported using both helped w/ sit to stand and gait. He was able to transfer to/from [facility], then later walk ~88 ft w/ FWRW till he sat in chair which we used to follow him with in gait. His pain was 6/10 post gait and returned to chair w; egg crate & pillows to decrease risk hyperext at spine in sitting. Wife has arranged ramp installed at home which will help for transfers in/out of home. Recommend w/chair w/ w/chair cushion and tall RW for him for gait. He deferred trying to get in/out of bed this visit but he now has bsc and gait to use for off loading spine and buttocks.   -GB  --  -GB    Row Name 07/16/21 0905          Plan of Care Review    Plan of Care Reviewed With  patient;spouse  -GB     Outcome Summary  PT eval completed w/ OT; Pt up in chair and does not caitlin  supine so spine protection done in sitting by applying LSO per TO's from DELORIS Hernandez. Pt has rib belt at home wife felt will help pt w/ LSO which we will try this pm. Pt felt LSO may help some now. Pt had extreme difficulty w/ sit to stand w/ max assist of 2 unable to extent hips to clear seat. Wife and PT discussed DME needs to include w/chair, tall or upright RW and ramp for home as his fall & fracture risk appears very high. Will follow up this pm w/ rib belt and re-attempt standing w/ spouse. PT will need 24/7 care, [facility]lene DME to allow him w/chair mobility and limited gait at best at home, and concerns for ability to ride in car to [facility] next week for consult.  To cont follow up and work w/ pt to assist in their goal to go to [facility] for opinion on his current condition.  -GB     Row Name 07/16/21 1421 07/16/21 0922       Vital Signs    Pre Systolic BP Rehab  --  147  -GB    Pre Treatment Diastolic BP  --  66  -GB    Post Systolic BP Rehab  --  145  -GB    Post Treatment Diastolic BP  --  63  -GB    Pretreatment Heart Rate (beats/min)  --  73  -GB    Posttreatment Heart Rate (beats/min)  --  68  -GB    Pre SpO2 (%)  --  97  -GB    O2 Delivery Pre Treatment  --  room air  -GB    Post SpO2 (%)  --  91  -GB    O2 Delivery Post Treatment  --  room air  -GB    Pre Patient Position  --  Sitting  -GB    Intra Patient Position  --  -- attempted stand but unable w/ max a x 2  -GB    Post Patient Position  --  Sitting  -GB    Recovery Time  no dizziness or intolerance reported by pt  -GB  --    Row Name 07/16/21 1421 07/16/21 0922       Physical Therapy Goals    Bed Mobility Goal Selection (PT)  bed mobility, PT goal (free text)  -GB  bed mobility, PT goal (free text)  -GB    Transfer Goal Selection (PT)  transfer, PT goal (free text)  -GB  transfer, PT goal (free text)  -GB    Gait Training Goal Selection (PT)  gait training, PT goal 1;gait training, PT goal (free text)  -GB  gait training, PT goal (free text)  -GB     Problem Specific Goal Selection (PT)  problem specific goal 1, PT  -GB  problem specific goal 1, PT  -GB    Row Name 07/16/21 1421 07/16/21 0922       Bed Mobility Goal (PT)    Bed Mobility Goal (PT)  pt will demo log roll in bed if he will return to bed for training;   -GB  pt will demo log roll in bed if he will return to bed for training;   -GB    Time Frame (Bed Mobility Goal, PT)  by discharge  -GB  by discharge  -GB    Progress/Outcomes (Bed Mobility Goal, PT)  goal not met  -GB  goal not met  -GB    Row Name 07/16/21 1421 07/16/21 0922       Transfer Goal (PT)    Transfer Goal (PT)  sit to stand or sitting transfer to alt surface and back with min assist of 1-2  -GB  sit to stand or sitting transfer to alt surface and back with min assist of 1-2  -GB    Time Frame (Transfer Goal, PT)  by discharge  -GB  by discharge  -GB    Progress/Outcome (Transfer Goal, PT)  good progress toward goal;goal partially met  -GB  goal not met  -GB    Row Name 07/16/21 1421          Gait Training Goal 1 (PT)    Activity/Assistive Device (Gait Training Goal 1, PT)  gait (walking locomotion);assistive device use;decrease fall risk  -GB     [place] Level (Gait Training Goal 1, PT)  modified independence;standby assist  -GB     Distance (Gait Training Goal 1, PT)  150 ft or more w/out LOB   -GB     Time Frame (Gait Training Goal 1, PT)  by discharge  -GB     Progress/Outcome (Gait Training Goal 1, PT)  goal not met;goal revised this date  -GB     Row Name 07/16/21 1421 07/16/21 0922       Gait Training Goal (PT)    Gait Training Goal (PT)  side stepping to transfer to alternate surface w/ AD w/out LOB  -GB  side stepping to transfer to alternate surface w/ AD w/out LOB  -GB    Time Frame (Gait Training Goal, PT)  by discharge  -GB  by discharge  -GB    Progress/Outcome (Gait Training Goal, PT)  good progress toward goal;goal met  -GB  goal not met  -GB    Row Name 07/16/21 1421 07/16/21 0922       Problem Specific Goal 1  (PT)    Problem Specific Goal 1 (PT)  pt/wife will demo safest means to don/doff LSO and rib belt for comfort in upright  -GB  pt/wife will demo safest means to don/doff LSO and rib belt for comfort in upright  -GB    Time Frame (Problem Specific Goal 1, PT)  by discharge  -GB  by discharge  -GB    Progress/Outcome (Problem Specific Goal 1, PT)  continuing progress toward goal;goal partially met  -GB  goal not met  -GB    Row Name 07/16/21 1421 07/16/21 0922       Positioning and Restraints    Pre-Treatment Position  sitting in chair/recliner  -GB  sitting in chair/recliner  -GB    Post Treatment Position  chair  -GB  chair  -GB    In Chair  sitting;call light within reach;encouraged to call for assist;with family/caregiver  -GB  notified nsg;reclined;sitting;call light within reach;encouraged to call for assist;with family/caregiver  -GB    Row Name 07/16/21 1421 07/16/21 0922       Therapy Assessment/Plan (PT)    Rehab Potential (PT)  good, to achieve stated therapy goals  -GB  fair, will monitor progress closely  -GB    Criteria for Skilled Interventions Met (PT)  yes  -GB  yes  -GB    Predicted Duration of Therapy Intervention (PT)  3 days  -GB  --    Problem List (PT)  mobility;strength;pain  -GB  --    Row Name 07/16/21 0922          PT Evaluation Complexity    History, PT Evaluation Complexity  3 or more personal factors and/or comorbidities  -GB     Examination of Body Systems (PT Eval Complexity)  total of 3 or more elements  -GB     Clinical Presentation (PT Evaluation Complexity)  evolving  -GB     Clinical Decision Making (PT Evaluation Complexity)  moderate complexity  -GB     Overall Complexity (PT Evaluation Complexity)  moderate complexity  -GB       User Key  (r) = Recorded By, (t) = Taken By, (c) = Cosigned By    Initials Name Provider Type    Paula Pineda, PT Physical Therapist        Physical Therapy Education                 Title: PT OT SLP Therapies (In Progress)     Topic:  Physical Therapy (Done)     Point: Mobility training (Done)     Learning Progress Summary           Patient Acceptance, D,E, DU,VU by  at 7/16/2021 1707    Comment: Application and use of LSO and rib belt, transfers, sit to stand and gait w/ FWRW to avoid fwd bending or rotation; keep back straight for posture, mobilty and rest;    Acceptance, D,E, DU,NR,VU by  at 7/16/2021 1312    Comment: POC; use of LSO w/ rib belt till TLSO available if still needed; spine protection   Family Acceptance, D,E, DU,VU by GB at 7/16/2021 1707    Comment: Application and use of LSO and rib belt, transfers, sit to stand and gait w/ FWRW to avoid fwd bending or rotation; keep back straight for posture, mobilty and rest;                   Point: Home exercise program (Done)     Learning Progress Summary           Patient Acceptance, D,E, DU,VU by  at 7/16/2021 1707    Comment: Application and use of LSO and rib belt, transfers, sit to stand and gait w/ FWRW to avoid fwd bending or rotation; keep back straight for posture, mobilty and rest;    Acceptance, D,E, DU,NR,VU by  at 7/16/2021 1312    Comment: POC; use of LSO w/ rib belt till TLSO available if still needed; spine protection   Family Acceptance, D,E, DU,VU by  at 7/16/2021 1707    Comment: Application and use of LSO and rib belt, transfers, sit to stand and gait w/ FWRW to avoid fwd bending or rotation; keep back straight for posture, mobilty and rest;                   Point: Body mechanics (Done)     Learning Progress Summary           Patient Acceptance, D,E, DU,VU by  at 7/16/2021 1707    Comment: Application and use of LSO and rib belt, transfers, sit to stand and gait w/ FWRW to avoid fwd bending or rotation; keep back straight for posture, mobilty and rest;    Acceptance, D,E, DU,NR,VU by  at 7/16/2021 1312    Comment: POC; use of LSO w/ rib belt till TLSO available if still needed; spine protection   Family Acceptance, D,E, DU,VU by  at 7/16/2021 1707     Comment: Application and use of LSO and rib belt, transfers, sit to stand and gait w/ FWRW to avoid fwd bending or rotation; keep back straight for posture, mobilty and rest;                   Point: Precautions (Done)     Learning Progress Summary           Patient Acceptance, D,E, DU,VU by  at 7/16/2021 1707    Comment: Application and use of LSO and rib belt, transfers, sit to stand and gait w/ FWRW to avoid fwd bending or rotation; keep back straight for posture, mobilty and rest;    Acceptance, D,E, ROSA MARIA,NR,VU by  at 7/16/2021 1312    Comment: POC; use of LSO w/ rib belt till TLSO available if still needed; spine protection   Family Acceptance, D,E, DU,VU by  at 7/16/2021 1707    Comment: Application and use of LSO and rib belt, transfers, sit to stand and gait w/ FWRW to avoid fwd bending or rotation; keep back straight for posture, mobilty and rest;                               User Key     Initials Effective Dates Name Provider Type Discipline     06/16/21 -  Paula Quintana, PT Physical Therapist PT              PT Recommendation and Plan  Anticipated Discharge Disposition (PT): home with 24/7 care, home with assist, home with home health (would rec home w/ assist in event more help wife w/. mobiliy)  Planned Therapy Interventions (PT): balance training, bed mobility training, gait training, home exercise program, lumbar stabilization, orthotic fitting/training, patient/family education, postural re-education, strengthening, transfer training  Therapy Frequency (PT): other (see comments) (5-7 d/wk)  Plan of Care Reviewed With: patient, spouse  Progress: improving  Outcome Summary: Pt and wife assisted in applying LSO and rib belt for thoracic area. He reported using both helped w/ sit to stand and gait. He was able to transfer to/from [facility], then later walk ~88 ft w/ FWRW till he sat in chair which we used to follow him with in gait. His pain was 6/10 post gait and returned to chair w; egg  crate & pillows to decrease risk hyperext at spine in sitting. Wife has arranged ramp installed at home which will help for transfers in/out of home. Recommend w/chair w/ w/chair cushion and tall RW for him for gait. He deferred trying to get in/out of bed this visit but he now has bsc and gait to use for off loading spine and buttocks.   Outcome Measures     Row Name 07/16/21 1700 07/16/21 1300          How much help from another person do you currently need...    Turning from your back to your side while in flat bed without using bedrails?  3  -GB  3  -GB     Moving from lying on back to sitting on the side of a flat bed without bedrails?  2  -GB  2  -GB     Moving to and from a bed to a chair (including a wheelchair)?  2  -GB  1  -GB     Standing up from a chair using your arms (e.g., wheelchair, bedside chair)?  2  -GB  1  -GB     Climbing 3-5 steps with a railing?  2  -GB  1  -GB     To walk in hospital room?  2  -GB  1  -GB     AM-PAC 6 Clicks Score (PT)  13  -GB  9  -GB        Functional Assessment    Outcome Measure Options  AM-PAC 6 Clicks Basic Mobility (PT)  -GB  AM-PAC 6 Clicks Basic Mobility (PT)  -GB       User Key  (r) = Recorded By, (t) = Taken By, (c) = Cosigned By    Initials Name Provider Type    Paula Pineda, PT Physical Therapist           Time Calculation:   PT Charges     Row Name 07/16/21 1421 07/16/21 0922          Time Calculation    Start Time  1421  -GB  0922  -GB     Stop Time  1503  -GB  1030  -GB     Time Calculation (min)  42 min  -GB  68 min  -GB     PT Received On  07/16/21  -GB  07/16/21  -GB     PT Goal Re-Cert Due Date  07/25/21  -GB  07/25/21  -GB        Timed Charges    42284 - Gait Training Minutes   19  -GB  --     39634 - PT Therapeutic Activity Minutes  23  -GB  --        Untimed Charges    PT Eval/Re-eval Minutes  --  68  -GB        Total Minutes    Timed Charges Total Minutes  42  -GB  --     Untimed Charges Total Minutes  --  68  -GB      Total Minutes   42  -GB  68  -GB       User Key  (r) = Recorded By, (t) = Taken By, (c) = Cosigned By    Initials Name Provider Type    Paula Pineda, PT Physical Therapist        Therapy Charges for Today     Code Description Service Date Service Provider Modifiers Qty    05508411230 HC-PT EVAL MOD COMPLEXITY 5 7/16/2021 Paula Quintana, PT  1    03248897360 HC PT THER SUPP EA 15 MIN 7/16/2021 Paula Quintana, PT GP 1    80168122266 HC GAIT TRAINING EA 15 MIN 7/16/2021 Paula Quintana, PT GP 1    82455731942 HC PT THERAPEUTIC ACT EA 15 MIN 7/16/2021 Paula Quintana, PT GP 2          PT G-Codes  Outcome Measure Options: AM-PAC 6 Clicks Basic Mobility (PT)  AM-PAC 6 Clicks Score (PT): 13  AM-PAC 6 Clicks Score (OT): 16    Paula Quintana, PT  7/16/2021

## 2021-07-16 NOTE — PROGRESS NOTES
[facility] Medicine Services  INPATIENT PROGRESS NOTE    Length of Stay: 0  Date of Admission: 7/15/2021  Primary Care Physician: Luis Miguel Orourke MD    Subjective   Chief Complaint: No complaints    HPI:     7/16/2021: Patient continues to have uncontrolled pain.  He is unable to walk secondary to leg weakness at this time.    This is a 70-year-old male with past medical history of hypertension, dyslipidemia, end-stage renal disease on hemodialysis (T, TH & Sat), AAA (status post repair) and multiple myeloma that presented to [facility] on 7/15/2021 with complaints of increasing back pain.  The patient was recently diagnosed with multiple myeloma with CT scan showing sclerosis and lytic lesion.  The patient follows with Dr. Cartwright at the [facility].  He is scheduled to undergo second opinion with Dr. Pleitez at [place] during the last week of July.      Review of Systems   Constitutional: Negative for activity change and fatigue.   HENT: Negative for ear pain and sore throat.    Eyes: Negative for pain and discharge.   Respiratory: Negative for cough and shortness of breath.    Cardiovascular: Negative for chest pain and palpitations.   Gastrointestinal: Negative for abdominal pain and nausea.   Endocrine: Negative for cold intolerance and heat intolerance.   Genitourinary: Negative for difficulty urinating and dysuria.   Musculoskeletal: Positive for back pain. Negative for arthralgias and gait problem.   Skin: Negative for color change and rash.   Neurological: Negative for dizziness and weakness.   Psychiatric/Behavioral: Negative for agitation and confusion.        Objective    Temp:  [96.4 °F (35.8 °C)-97.5 °F (36.4 °C)] 97.4 °F (36.3 °C)  Heart Rate:  [63-87] 82  Resp:  [16-18] 18  BP: (124-190)/(60-88) 139/67    Physical Exam  Constitutional:       Appearance: He is well-developed.   HENT:      Head: Normocephalic and atraumatic.   Eyes:      Pupils:  Pupils are equal, round, and reactive to light.   Cardiovascular:      Rate and Rhythm: Normal rate and regular rhythm.   Pulmonary:      Effort: Pulmonary effort is normal.      Breath sounds: Normal breath sounds.   Abdominal:      General: Bowel sounds are normal.      Palpations: Abdomen is soft.   Musculoskeletal:         General: Normal range of motion.      Cervical back: Normal range of motion and neck supple.   Skin:     General: Skin is warm and dry.   Neurological:      Mental Status: He is alert and oriented to person, place, and time.   Psychiatric:         Behavior: Behavior normal.       Results Review:  I have reviewed the labs, radiology results, and diagnostic studies.    Laboratory Data:   Results from last 7 days   Lab Units 07/16/21  0618 07/15/21  0525   SODIUM mmol/L 131* 135*   POTASSIUM mmol/L 4.2 4.2   CHLORIDE mmol/L 94* 96*   CO2 mmol/L 29.0 29.0   BUN mg/dL 38* 60*   CREATININE mg/dL 4.52* 6.32*   GLUCOSE mg/dL 84 122*   CALCIUM mg/dL 10.5 10.3   BILIRUBIN mg/dL  --  0.3   ALK PHOS U/L  --  67   ALT (SGPT) U/L  --  7   AST (SGOT) U/L  --  10   ANION GAP mmol/L 8.0 10.0     Estimated Creatinine Clearance: 18.4 mL/min (A) (by C-G formula based on SCr of 4.52 mg/dL (H)).          Results from last 7 days   Lab Units 07/16/21 0618 07/15/21  0525   WBC 10*3/mm3 7.51 10.43   HEMOGLOBIN g/dL 8.7* 7.8*   HEMATOCRIT % 27.6* 24.4*   PLATELETS 10*3/mm3 284 276           Culture Data:   No results found for: BLOODCX  No results found for: URINECX  No results found for: RESPCX  No results found for: WOUNDCX  No results found for: STOOLCX  No components found for: BODYFLD    Radiology Data:   Imaging Results (Last 24 Hours)     ** No results found for the last 24 hours. **          I have reviewed the patient's current medications.     Assessment/Plan     Active Hospital Problems    Diagnosis  POA   • **Metastatic multiple myeloma to bone (CMS/HCC) [C90.00]  Yes   • ESRF (end stage renal failure)  (CMS/[facility]) [N18.6]  Yes       Plan:     1.  Back pain secondary to multiple myeloma: Oncology consult appreciated.  Morphine increased secondary to uncontrolled pain.  2.  Stage renal disease on hemodialysis: Nephrology consult appreciated.  3.  Hypertension: Continue home amlodipine, hydralazine and metoprolol.  4.  Anemia, chronic: Hemoglobin today is 8.7.  We will continue to monitor.  5.  Hypothyroidism: Continue home Synthroid.  6.  Hyperlipidemia: Continue home statin.        Discharge Planning: I expect patient to be discharged to home in 1-2 days.    I confirmed that the patient's Advance Care Plan is present, code status is documented, or surrogate decision maker is listed in the patient's medical record.      I have utilized all available immediate resources to obtain, update, or review the patient's current medications.         This document has been electronically signed by DELORIS Sharp on July 16, 2021 15:15 CDT

## 2021-07-16 NOTE — THERAPY EVALUATION
Patient Name: Jesús Austin  : 1950    MRN: [id number]                              Today's Date: 2021       Admit Date: 7/15/2021    Visit Dx:     ICD-10-CM ICD-9-CM   1. Metastatic multiple myeloma to bone (CMS/HCC)  C90.00 203.00   2. Impaired functional mobility, balance, gait, and endurance  Z74.09 V49.89   3. Impaired mobility and ADLs  Z74.09 V49.89    Z78.9      Patient Active Problem List   Diagnosis   • ESRF (end stage renal failure) (CMS/HCC)   • Uncontrolled hypertension   • Carotid artery stenosis   • Abdominal aortic aneurysm (CMS/HCC)   • Degenerative joint disease involving multiple joints   • Great toe pain, right   • ESRD (end stage renal disease) on dialysis (CMS/HCC)   • Arteriovenous fistula stenosis (CMS/HCC)   • Multifocal abnormality of bone   • Acute on chronic anemia   • Acquired hypothyroidism   • Allergy, unspecified, initial encounter   • Anaphylactic shock, unspecified, initial encounter   • Anemia in chronic kidney disease   • Anemia in chronic kidney disease   • Benign hypertensive heart and kidney disease with chronic kidney disease   • Benign prostatic hyperplasia with nocturia   • Degeneration of intervertebral disc of lumbar region   • Aftercare including intermittent dialysis (CMS/HCC)   • Disorder of phosphorus metabolism, unspecified   • Encounter for screening for respiratory tuberculosis   • Headache, unspecified   • Hyperkalemia   • Iron deficiency anemia   • Kidney transplant status, living unrelated donor   • Other disorders of plasma-protein metabolism, not elsewhere classified   • Secondary hyperparathyroidism of renal origin (CMS/HCC)   • Blood in stool   • Anemia due to chronic kidney disease, on chronic dialysis (CMS/HCC)   • Multiple myeloma not having achieved remission (CMS/HCC)   • Metastatic multiple myeloma to bone (CMS/HCC)     Past Medical History:   Diagnosis Date   • Abdominal aortic aneurysm (CMS/HCC)    • Allergic rhinitis    • BPH (benign  prostatic hyperplasia)    • Carotid artery stenosis    • Chronic anemia    • Diverticulitis    • ESRD (end stage renal disease) on dialysis (CMS/HCC)    • History of transfusion    • Hypercholesteremia    • Hypertension    • Hypothyroidism    • Malignant tumor of Meckel's diverticulum (CMS/HCC)    • Multiple myeloma (CMS/HCC)    • Nephrolithiasis    • Osteoarthritis    • Rheumatic fever    • Secondary hyperparathyroidism of renal origin (CMS/HCC)    • Stroke (CMS/HCC)      Past Surgical History:   Procedure Laterality Date   • ABDOMINAL AORTIC ANEURYSM REPAIR     • ARTERIOVENOUS FISTULA Left    • ARTERIOVENOUS FISTULA/SHUNT SURGERY Left 6/17/2020   • COLONOSCOPY N/A 6/28/2021    Procedure: COLONOSCOPY;  Surgeon: Iggy El DO;  Location: [facility] ENDOSCOPY;  Service: Gastroenterology;  Laterality: N/A;  argon to avms right colon   • ENDOSCOPY N/A 6/28/2021    Procedure: ESOPHAGOGASTRODUODENOSCOPY;  Surgeon: Iggy El DO;  Location: [facility] ENDOSCOPY;  Service: Gastroenterology;  Laterality: N/A;   • INTERVENTIONAL RADIOLOGY PROCEDURE N/A 8/23/2017   • INTERVENTIONAL RADIOLOGY PROCEDURE Left 6/5/2020   • INTERVENTIONAL RADIOLOGY PROCEDURE Left 8/27/2020   • TESTICLE TORSION REPAIR N/A      General Information     Row Name 07/16/21 0921          OT Time and Intention    Document Type  evaluation  -ME     Mode of Treatment  co-treatment;occupational therapy;physical therapy  -ME     Row Name 07/16/21 0921          General Information    Patient Profile Reviewed  yes  -ME     Prior Level of Function  independent:;all household mobility;min assist:;mod assist:;ADL's requires assistance for LB, has been able to do UB bathing and dressing  -ME     Existing Precautions/Restrictions  fall;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; 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  -ME     Barriers to Rehab  medically complex  -ME     Row Name 07/16/21 0921          Living Environment    Lives With  spouse  -ME     Row Name 07/16/21 0921           Home Main Entrance    Number of Stairs, Main Entrance  two  -ME     Stair Railings, Main Entrance  none  -ME     Row Name 07/16/21 0921          Stairs Within Home, Primary    Stairs, Within Home, Primary  walk in shower with a built in chair; has a RW, no wheelchair  -ME     Number of Stairs, Within Home, Primary  two  -ME     Stair Railings, Within Home, Primary  none  -ME     Row Name 07/16/21 0921          Cognition    Orientation Status (Cognition)  oriented x 4  -ME     Row Name 07/16/21 0921          Safety Issues, Functional Mobility    Safety Issues Affecting Function (Mobility)  awareness of need for assistance;safety precaution awareness;safety precautions follow-through/compliance  -ME     Impairments Affecting Function (Mobility)  balance;endurance/activity tolerance;strength;postural/trunk control;pain  -ME       User Key  (r) = Recorded By, (t) = Taken By, (c) = Cosigned By    Initials Name Provider Type    ME Joe Gonzalez, OTR/L Occupational Therapist          Mobility/ADL's     Row Name 07/16/21 0921          Bed Mobility    Comment (Bed Mobility)  up in chair at beginning and end of session; wife states that pt sleep in the chair all night  -ME     Row Name 07/16/21 0921          Transfers    Transfers  sit-stand transfer  -ME     Comment (Transfers)  unable to stand erect secondary to BLE weakness, and pain; back brace donned  -ME     Sit-Stand [place] (Transfers)  maximum assist (25% patient effort);2 person assist  -ME     Row Name 07/16/21 0921          Sit-Stand Transfer    Assistive Device (Sit-Stand Transfers)  walker, front-wheeled  -ME     Row Name 07/16/21 0921          Functional Mobility    Functional Mobility- Comment  unable to ambulate at this time  -ME       User Key  (r) = Recorded By, (t) = Taken By, (c) = Cosigned By    Initials Name Provider Type    ME Joe Gonzalez, OTR/L Occupational Therapist        Obj/Interventions     Row Name 07/16/21 0921          Sensory  Assessment (Somatosensory)    Sensory Assessment (Somatosensory)  UE sensation intact  -ME     Row Name 07/16/21 0921          Range of Motion Comprehensive    General Range of Motion  no range of motion deficits identified;bilateral upper extremity ROM WFL  -ME     Row Name 07/16/21 0921          Strength Comprehensive (MMT)    General Manual Muscle Testing (MMT) Assessment  other (see comments)  -ME     Comment, General Manual Muscle Testing (MMT) Assessment  BUE strength and bilateral  strength are grossly 4- to 4/5; pt is right handed  -ME       User Key  (r) = Recorded By, (t) = Taken By, (c) = Cosigned By    Initials Name Provider Type    ME Joe Gonzalez, OTR/L Occupational Therapist        Goals/Plan     Row Name 07/16/21 0921          Transfer Goal 1 (OT)    Activity/Assistive Device (Transfer Goal 1, OT)  toilet  -ME     [place] Level/Cues Needed (Transfer Goal 1, OT)  minimum assist (75% or more patient effort)  -ME     Time Frame (Transfer Goal 1, OT)  long term goal (LTG);by discharge  -ME     Progress/Outcome (Transfer Goal 1, OT)  goal not met  -ME     Row Name 07/16/21 0921          Bathing Goal 1 (OT)    Activity/Device (Bathing Goal 1, OT)  upper body bathing  -ME     [place] Level/Cues Needed (Bathing Goal 1, OT)  set-up required;supervision required  -ME     Time Frame (Bathing Goal 1, OT)  long term goal (LTG);by discharge  -ME     Progress/Outcomes (Bathing Goal 1, OT)  goal not met  -ME     Row Name 07/16/21 0921          Dressing Goal 1 (OT)    Activity/Device (Dressing Goal 1, OT)  upper body dressing  -ME     [place]/Cues Needed (Dressing Goal 1, OT)  set-up required;supervision required  -ME     Time Frame (Dressing Goal 1, OT)  long term goal (LTG);by discharge  -ME     Progress/Outcome (Dressing Goal 1, OT)  goal not met  -ME     Row Name 07/16/21 0921          Therapy Assessment/Plan (OT)    Planned Therapy Interventions (OT)  activity tolerance training;adaptive  equipment training;BADL retraining;IADL retraining;functional balance retraining;occupation/activity based interventions;patient/caregiver education/training;ROM/therapeutic exercise;strengthening exercise;transfer/mobility retraining  -ME       User Key  (r) = Recorded By, (t) = Taken By, (c) = Cosigned By    Initials Name Provider Type    ME Joe Gonzalez OTR/L Occupational Therapist        Clinical Impression     Row Name 07/16/21 0921          Pain Assessment    Additional Documentation  Pain Scale: Numbers Pre/Post-Treatment (Group)  -ME     Row Name 07/16/21 0921          Pain Scale: Numbers Pre/Post-Treatment    Pretreatment Pain Rating  10/10  -ME     Posttreatment Pain Rating  10/10  -ME     Pain Location - Orientation  lower  -ME     Pain Location  back  -ME     Pain Intervention(s)  Repositioned;Ambulation/increased activity;Distraction  -ME     Row Name 07/16/21 0921          Plan of Care Review    Plan of Care Reviewed With  patient;spouse  -ME     Row Name 07/16/21 0921          Therapy Assessment/Plan (OT)    Patient/Family Therapy Goal Statement (OT)  to return home  -ME     Rehab Potential (OT)  good, to achieve stated therapy goals  -ME     Criteria for Skilled Therapeutic Interventions Met (OT)  yes;meets criteria;skilled treatment is necessary  -ME     Therapy Frequency (OT)  other (see comments) 5-7 days per week  -ME     Predicted Duration of Therapy Intervention (OT)  until d/c or all goals met  -ME     Row Name 07/16/21 0921          Therapy Plan Review/Discharge Plan (OT)    Anticipated Discharge Disposition (OT)  home;home with 24/7 care;home with home health  -[facility] 07/16/21 0921          Vital Signs    Pre Systolic BP Rehab  147  -ME     Pre Treatment Diastolic BP  66  -ME     Post Systolic BP Rehab  145  -ME     Post Treatment Diastolic BP  63  -ME     Pretreatment Heart Rate (beats/min)  73  -ME     Posttreatment Heart Rate (beats/min)  68  -ME     Pre SpO2 (%)  97  -ME      O2 Delivery Pre Treatment  room air  -ME     Post SpO2 (%)  91  -ME     Pre Patient Position  Sitting  -ME     Post Patient Position  Sitting  -ME     Row Name 07/16/21 0921          Positioning and Restraints    Pre-Treatment Position  sitting in chair/recliner  -ME     Post Treatment Position  chair  -ME     In Chair  notified nsg;reclined;encouraged to call for assist;call light within reach;exit alarm on;with family/caregiver  -ME       User Key  (r) = Recorded By, (t) = Taken By, (c) = Cosigned By    Initials Name Provider Type    Joe Pop, OTR/L Occupational Therapist        Outcome Measures     Row Name 07/16/21 0921          How much help from another is currently needed...    Putting on and taking off regular lower body clothing?  2  -ME     Bathing (including washing, rinsing, and drying)  2  -ME     Toileting (which includes using toilet bed pan or urinal)  2  -ME     Putting on and taking off regular upper body clothing  3  -ME     Taking care of personal grooming (such as brushing teeth)  3  -ME     Eating meals  4  -ME     AM-PAC 6 Clicks Score (OT)  16  -ME     Row Name 07/16/21 1300          How much help from another person do you currently need...    Turning from your back to your side while in flat bed without using bedrails?  3  -GB     Moving from lying on back to sitting on the side of a flat bed without bedrails?  2  -GB     Moving to and from a bed to a chair (including a wheelchair)?  1  -GB     Standing up from a chair using your arms (e.g., wheelchair, bedside chair)?  1  -GB     Climbing 3-5 steps with a railing?  1  -GB     To walk in hospital room?  1  -GB     AM-PAC 6 Clicks Score (PT)  9  -GB     Row Name 07/16/21 1300 07/16/21 0921       Functional Assessment    Outcome Measure Options  AM-PAC 6 Clicks Basic Mobility (PT)  -GB  AM-PAC 6 Clicks Daily Activity (OT)  -ME      User Key  (r) = Recorded By, (t) = Taken By, (c) = Cosigned By    Initials Name Provider Type    ORA  Paula Quintana, PT Physical Therapist    ME Joe Gonzalez, OTR/L Occupational Therapist        Occupational Therapy Education                 Title: PT OT SLP Therapies (In Progress)     Topic: Occupational Therapy (In Progress)     Point: ADL training (Not Started)     Description:   Instruct learner(s) on proper safety adaptation and remediation techniques during self care or transfers.   Instruct in proper use of assistive devices.              Learner Progress:  Not documented in this visit.          Point: Home exercise program (Not Started)     Description:   Instruct learner(s) on appropriate technique for monitoring, assisting and/or progressing therapeutic exercises/activities.              Learner Progress:  Not documented in this visit.          Point: Precautions (Done)     Description:   Instruct learner(s) on prescribed precautions during self-care and functional transfers.              Learning Progress Summary           Patient Acceptance, E, VU by ME at 7/16/2021 1024    Comment: Educated on OT and POC. Educated to call for assistance. Educated on safety precautions. Educated on donning/doffing back brace.   Significant Other Acceptance, E, VU by ME at 7/16/2021 1024    Comment: Educated on OT and POC. Educated to call for assistance. Educated on safety precautions. Educated on donning/doffing back brace.                   Point: Body mechanics (Done)     Description:   Instruct learner(s) on proper positioning and spine alignment during self-care, functional mobility activities and/or exercises.              Learning Progress Summary           Patient Acceptance, E, VU by ME at 7/16/2021 1024    Comment: Educated on OT and POC. Educated to call for assistance. Educated on safety precautions. Educated on donning/doffing back brace.   Significant Other Acceptance, E, VU by ME at 7/16/2021 1024    Comment: Educated on OT and POC. Educated to call for assistance. Educated on safety  precautions. Educated on donning/doffing back brace.                               User Key     Initials Effective Dates Name Provider Type Discipline    ME 06/16/21 -  Joe Gonzalez, OTR/L Occupational Therapist OT              OT Recommendation and Plan  Planned Therapy Interventions (OT): activity tolerance training, adaptive equipment training, BADL retraining, IADL retraining, functional balance retraining, occupation/activity based interventions, patient/caregiver education/training, ROM/therapeutic exercise, strengthening exercise, transfer/mobility retraining  Therapy Frequency (OT): other (see comments) (5-7 days per week)  Plan of Care Review  Plan of Care Reviewed With: patient, spouse  Outcome Summary: initial OT evaluation complete. coeval with PT. pt was pleasant and cooperative throughout, though lethargic and in increased amounts of low back pain this morning. pt has myeloma that is now metastatic and to the bone (spine). pt was given/donned a back brace to assist with pain and keeping pt more upright. BUE ROM WFL grossly. BUE strength and bilateral  strength are grossly 4- to 4/5. pt was max A x 2 for sit to stand transfer with use of RW. unable to get to full standing secondary to pain and BLE giving out. up in chair at end of session with MD and spouse in room. recommend further skilled OT services to address functional mobility and ADL deficits, as well as balance, strength, and endurance. recommend home with 24/7 care and home health OT at discharge. goals established.     Time Calculation:   Time Calculation- OT     Row Name 07/16/21 1319             Time Calculation- OT    OT Start Time  0921  -ME      OT Stop Time  1029  -ME      OT Time Calculation (min)  68 min  -ME      OT Received On  07/16/21  -ME      OT Goal Re-Cert Due Date  07/29/21  -ME         Untimed Charges    OT Eval/Re-eval Minutes  68  -ME         Total Minutes    Untimed Charges Total Minutes  68  -ME       Total Minutes   68  -ME        User Key  (r) = Recorded By, (t) = Taken By, (c) = Cosigned By    Initials Name Provider Type    ME Joe Gonzalez OTR/L Occupational Therapist        Therapy Charges for Today     Code Description Service Date Service Provider Modifiers Qty    [id number] -OT EVAL MOD COMPLEXITY 5 7/16/2021 Joe Gonzalez OTR/L  1               Joe Gonzalez OTR/SADIA  7/16/2021

## 2021-07-16 NOTE — PLAN OF CARE
Goal Outcome Evaluation:  Plan of Care Reviewed With: patient, spouse           Outcome Summary: initial OT evaluation complete. coeval with PT. pt was pleasant and cooperative throughout, though lethargic and in increased amounts of low back pain this morning. pt has myeloma that is now metastatic and to the bone (spine). pt was given/donned a back brace to assist with pain and keeping pt more upright. BUE ROM WFL grossly. BUE strength and bilateral  strength are grossly 4- to 4/5. pt was max A x 2 for sit to stand transfer with use of RW. unable to get to full standing secondary to pain and BLE giving out. up in chair at end of session with MD and spouse in room. recommend further skilled OT services to address functional mobility and ADL deficits, as well as balance, strength, and endurance. recommend home with 24/7 care and home health OT at discharge. goals established.

## 2021-07-16 NOTE — PLAN OF CARE
Goal Outcome Evaluation:  Plan of Care Reviewed With: patient        Progress: no change  Outcome Summary: Patient complaining of severe lower back pain. Pain finally under control after administration of fentanyl patch, PRN morphine and PRN oxy IR. VSS.

## 2021-07-16 NOTE — PROGRESS NOTES
HISTORY OF PRESENT ILLNESS:  Complains of uncontrolled low back pain and hip pain.  Denies any bleeding.        PAST MEDICAL HISTORY:    Past Medical History:   Diagnosis Date   • Abdominal aortic aneurysm (CMS/HCC)    • Allergic rhinitis    • BPH (benign prostatic hyperplasia)    • Carotid artery stenosis    • Chronic anemia    • Diverticulitis    • ESRD (end stage renal disease) on dialysis (CMS/HCC)    • History of transfusion    • Hypercholesteremia    • Hypertension    • Hypothyroidism    • Malignant tumor of Meckel's diverticulum (CMS/HCC)    • Multiple myeloma (CMS/HCC)    • Nephrolithiasis    • Osteoarthritis    • Rheumatic fever    • Secondary hyperparathyroidism of renal origin (CMS/HCC)    • Stroke (CMS/HCC)        SOCIAL HISTORY:    Social History     Tobacco Use   • Smoking status: Never Smoker   • Smokeless tobacco: Never Used   Vaping Use   • Vaping Use: Never used   Substance Use Topics   • Alcohol use: No   • Drug use: No       Surgical History :  Past Surgical History:   Procedure Laterality Date   • ABDOMINAL AORTIC ANEURYSM REPAIR     • ARTERIOVENOUS FISTULA Left    • ARTERIOVENOUS FISTULA/SHUNT SURGERY Left 6/17/2020   • COLONOSCOPY N/A 6/28/2021    Procedure: COLONOSCOPY;  Surgeon: Iggy El DO;  Location: [facility] ENDOSCOPY;  Service: Gastroenterology;  Laterality: N/A;  argon to avms right colon   • ENDOSCOPY N/A 6/28/2021    Procedure: ESOPHAGOGASTRODUODENOSCOPY;  Surgeon: Iggy El DO;  Location: [facility] ENDOSCOPY;  Service: Gastroenterology;  Laterality: N/A;   • INTERVENTIONAL RADIOLOGY PROCEDURE N/A 8/23/2017   • INTERVENTIONAL RADIOLOGY PROCEDURE Left 6/5/2020   • INTERVENTIONAL RADIOLOGY PROCEDURE Left 8/27/2020   • TESTICLE TORSION REPAIR N/A        ALLERGIES:    Allergies   Allergen Reactions   • Dilaudid [Hydromorphone Hcl] Mental Status Change   • Tape Rash     Just plastic tape         FAMILY HISTORY:  Family History   Problem Relation Age of Onset   • Heart  "failure Father            REVIEW OF SYSTEMS:      CONSTITUTIONAL:  Complains of fatigue. Denies any fever, chills or weight loss.     EYES: No visual disturbances. No discharge. No new lesions    ENMT:  No epistaxis, mouth sores or difficulty swallowing.    RESPIRATORY:  No new shortness of breath. No new cough or hemoptysis.    CARDIOVASCULAR:  No chest pain or palpitations.    GASTROINTESTINAL:  No abdominal pain nausea, vomiting or blood in the stool.    GENITOURINARY: No Dysuria or Hematuria.    MUSCULOSKELETAL: Complains of uncontrolled back pain and hip pain.  Positive for arthritis.    LYMPHATICS:  Denies any abnormal swollen glands anywhere in the body.    NEUROLOGICAL : Positive for tingling and numbness affecting bilateral lower extremity.. No headache or dizziness. No seizures or balance problems.    SKIN: No new skin lesions.    ENDOCRINE : No new hot or cold intolerance. No new polyuria . No polydipsia.        PHYSICAL EXAMINATION:      VITAL SIGNS:  /72 (BP Location: Right arm, Patient Position: Sitting)   Pulse 77   Temp 97.2 °F (36.2 °C) (Temporal)   Resp 18   Ht 180.3 cm (71\")   Wt 107 kg (236 lb 9.6 oz)   SpO2 92%   BMI 33.00 kg/m²       07/15/21  0836 07/15/21  1725 07/16/21  0558   Weight: 109 kg (240 lb 12.8 oz) 107 kg (235 lb 9.6 oz) 107 kg (236 lb 9.6 oz)             CONSTITUTIONAL:  Not in any distress.    EYES: Mild conjunctival Pallor. No Icterus. No Pterygium. Extraocular Movements intact.No ptosis.    ENMT:  Normocephalic, Atraumatic.No Facial Asymmetry noted.    NECK:  No adenopathy.Trachea midline. NO JVD.    RESPIRATORY: Diminished air entry at bilateral base. No rhonchi or wheezing.Fair respiratory effort.    CARDIOVASCULAR:  S1, S2. Regular rate and rhythm. No murmur or gallop appreciated.    ABDOMEN:  Soft, obese, nontender. Bowel sounds present in all four quadrants.  No Hepatosplenomegaly appreciated.    MUSCULOSKELETAL:  Trace edema.No Calf Tenderness.Decreased range " of motion.    NEUROLOGIC:    No  Motor  deficit appreciated. Cranial Nerves 2-12 grossly intact.    SKIN : No new skin lesion identified. Skin is warm and moist to touch.    LYMPHATICS: No new enlarged lymph nodes in neck or supraclavicular area.    PSYCHIATRY: Alert, awake and oriented ×3.            DIAGNOSTIC DATA:    Glucose   Date Value Ref Range Status   07/16/2021 84 65 - 99 mg/dL Final     Sodium   Date Value Ref Range Status   07/16/2021 131 (L) 136 - 145 mmol/L Final     Potassium   Date Value Ref Range Status   07/16/2021 4.2 3.5 - 5.2 mmol/L Final     Comment:     Slight hemolysis detected by analyzer. Results may be affected.     CO2   Date Value Ref Range Status   07/16/2021 29.0 22.0 - 29.0 mmol/L Final     Chloride   Date Value Ref Range Status   07/16/2021 94 (L) 98 - 107 mmol/L Final     Anion Gap   Date Value Ref Range Status   07/16/2021 8.0 5.0 - 15.0 mmol/L Final     Creatinine   Date Value Ref Range Status   07/16/2021 4.52 (H) 0.76 - 1.27 mg/dL Final     BUN   Date Value Ref Range Status   07/16/2021 38 (H) 8 - 23 mg/dL Final     BUN/Creatinine Ratio   Date Value Ref Range Status   07/16/2021 8.4 7.0 - 25.0 Final     Calcium   Date Value Ref Range Status   07/16/2021 10.5 8.6 - 10.5 mg/dL Final     eGFR Non  Amer   Date Value Ref Range Status   07/16/2021 13 (L) >60 mL/min/1.73 Final     Comment:     <15 Indicative of kidney failure.     Alkaline Phosphatase   Date Value Ref Range Status   07/15/2021 67 39 - 117 U/L Final     Total Protein   Date Value Ref Range Status   07/15/2021 8.3 6.0 - 8.5 g/dL Final     ALT (SGPT)   Date Value Ref Range Status   07/15/2021 7 1 - 41 U/L Final     AST (SGOT)   Date Value Ref Range Status   07/15/2021 10 1 - 40 U/L Final     Total Bilirubin   Date Value Ref Range Status   07/15/2021 0.3 0.0 - 1.2 mg/dL Final     Albumin   Date Value Ref Range Status   07/15/2021 3.70 3.50 - 5.20 g/dL Final     Globulin   Date Value Ref Range Status   07/15/2021 4.6  gm/dL Final     Lab Results   Component Value Date    WBC 7.51 07/16/2021    HGB 8.7 (L) 07/16/2021    HCT 27.6 (L) 07/16/2021    .0 (H) 07/16/2021     07/16/2021     Lab Results   Component Value Date    NEUTROABS 8.70 (H) 07/15/2021    IRON 71 07/07/2021    TIBC 297 (L) 07/07/2021    LABIRON 24 07/07/2021    FERRITIN 691.00 (H) 07/07/2021    [id number] >2,000 (H) 06/08/2021    FOLATE 5.97 06/08/2021     No results found for: , LABCA2, AFPTM, HCGQUANT, , CHROMGRNA, [id number], CEA, REFLABREPO        PATHOLOGY:  * Cannot find OR log *         RADIOLOGY DATA :  XR Bone Survey Limited    Result Date: 7/11/2021  Diffusely mottled appearance of the appendicular and axial skeleton bone marrow as described above. This would be consistent with multiple myeloma. Electronically signed by:  Jose Ambriz MD  7/11/2021 10:42 AM CDT Workstation: [id number]    CT Lumbar Spine Without Contrast    Result Date: 7/15/2021  1.  Interval development of a markedly heterogenous appearance of the vertebral bodies as well as the osseous structures of the pelvis including the sacrum. Multiple lytic lesions are noted. Findings are consistent with the patient's history of multiple myeloma. 2.  Degenerative discogenic changes of the lumbar spine greatest at L4-5 where there is a diffuse annular bulge with facet arthrosis and ligamentum flavum hypertrophy resulting in mild to moderate central canal stenosis and subarticular recess narrowing. Electronically signed by:  Marielos Pimentel DO  7/15/2021 6:28 AM CDT Workstation: [id number]    XR Chest 1 View    Result Date: 7/15/2021  Cardiomegaly. Small left basilar density near the costophrenic sulcus which could represent small left pleural effusion as well as atelectasis. Electronically signed by:  Jose Armando Doty DO  7/15/2021 6:16 AM CDT Workstation: [id number]        ASSESSMENT AND PLAN:      1.  Multiple myeloma:  -Bone marrow biopsy showed 35% plasma cells  with anemia as well as lytic lesion on skeletal survey as well as CT scan.  -Upon follow-up visit today patient states he wants to try chemotherapy.  -We will try to arrange for chemotherapy consisting of Cytoxan, Velcade and dexamethasone as outpatient in next week or so.  -Hemoglobin is 8.7  -Recommend as needed transfusion if hemoglobin is less than 7  -Has been started on dexamethasone 20 mg p.o. daily for 3 days starting today.    2.  Anemia:  -Multifactorial  -Hemoglobin is 8.7  -We will transfuse as needed if hemoglobin is less than 7    3.  Back pain and hip pain:  -Secondary to arthritis plus lytic lesion from myeloma  -Morphine sulfate extended release could not be given secondary to end-stage renal disease  -Has been started on fentanyl 12 mcg patch yesterday.  Due to his end-stage renal disease, pain medication may be metabolized slowly than usual  -If pain still uncontrolled tomorrow will increase fentanyl to 25 mcg patch tomorrow on July 17, 2021  -Patient has taken only 3 doses of oxycodone 10 mg since last evening.  Patient states he forgets to ask for oxycodone and then his pain gets worse.  -We will change oxycodone to every 4 hours  -Was counseled about bowel regimen.    4.  End-stage renal disease on hemodialysis    5.  Hypertension    6.  Dyslipidemia            Vern Cartwright MD  7/16/2021  12:35 CDT        Part of this note may be an electronic transcription/translation of spoken language to printed text using the Dragon Dictation System.

## 2021-07-17 NOTE — PLAN OF CARE
Problem: Fall Injury Risk  Goal: Absence of Fall and Fall-Related Injury  Outcome: Ongoing, Progressing     Problem: Fall Injury Risk  Goal: Absence of Fall and Fall-Related Injury  Intervention: Promote Injury-Free Environment  Recent Flowsheet Documentation  Taken 7/17/2021 0012 by Lori Gray RN  Safety Promotion/Fall Prevention:   safety round/check completed   nonskid shoes/slippers when out of bed   activity supervised   assistive device/personal items within reach   clutter free environment maintained  Taken 7/16/2021 2228 by Lori Gray RN  Safety Promotion/Fall Prevention:   safety round/check completed   nonskid shoes/slippers when out of bed   activity supervised   assistive device/personal items within reach   clutter free environment maintained  Taken 7/16/2021 2104 by Lori Gray RN  Safety Promotion/Fall Prevention:   activity supervised   assistive device/personal items within reach   clutter free environment maintained   fall prevention program maintained   safety round/check completed   nonskid shoes/slippers when out of bed  Taken 7/16/2021 2103 by Lori Gray RN  Safety Promotion/Fall Prevention:   safety round/check completed   nonskid shoes/slippers when out of bed  Taken 7/16/2021 2045 by Lori Gray RN  Safety Promotion/Fall Prevention:   safety round/check completed   nonskid shoes/slippers when out of bed  Taken 7/16/2021 1943 by Lori Gray RN  Safety Promotion/Fall Prevention:   safety round/check completed   nonskid shoes/slippers when out of bed     Problem: Adult Inpatient Plan of Care  Goal: Plan of Care Review  Outcome: Ongoing, Progressing  Flowsheets  Taken 7/17/2021 0331 by Lori Gray RN  Plan of Care Reviewed With: patient  Outcome Summary: pt vs stable, pain seems to be controlled, pt up to bathroom with assist and walker tolerated well.  Taken 7/16/2021 1708 by Paula Quintana PT  Progress: improving      Problem: Adult Inpatient Plan of Care  Goal: Patient-Specific Goal (Individualized)  Outcome: Ongoing, Progressing     Problem: Adult Inpatient Plan of Care  Goal: Absence of Hospital-Acquired Illness or Injury  Outcome: Ongoing, Progressing     Problem: Adult Inpatient Plan of Care  Goal: Absence of Hospital-Acquired Illness or Injury  Intervention: Identify and Manage Fall Risk  Recent Flowsheet Documentation  Taken 7/17/2021 0012 by Lori Gray RN  Safety Promotion/Fall Prevention:   safety round/check completed   nonskid shoes/slippers when out of bed   activity supervised   assistive device/personal items within reach   clutter free environment maintained  Taken 7/16/2021 2228 by Lori Gray RN  Safety Promotion/Fall Prevention:   safety round/check completed   nonskid shoes/slippers when out of bed   activity supervised   assistive device/personal items within reach   clutter free environment maintained  Taken 7/16/2021 2104 by Lori Gray RN  Safety Promotion/Fall Prevention:   activity supervised   assistive device/personal items within reach   clutter free environment maintained   fall prevention program maintained   safety round/check completed   nonskid shoes/slippers when out of bed  Taken 7/16/2021 2103 by Lori Gray RN  Safety Promotion/Fall Prevention:   safety round/check completed   nonskid shoes/slippers when out of bed  Taken 7/16/2021 2045 by Lori Gray RN  Safety Promotion/Fall Prevention:   safety round/check completed   nonskid shoes/slippers when out of bed  Taken 7/16/2021 1943 by Lori Gray RN  Safety Promotion/Fall Prevention:   safety round/check completed   nonskid shoes/slippers when out of bed     Problem: Adult Inpatient Plan of Care  Goal: Absence of Hospital-Acquired Illness or Injury  Intervention: Prevent Skin Injury  Recent Flowsheet Documentation  Taken 7/17/2021 0012 by Lori Gray RN  Body Position: (up in chair)  other (see comments)  Taken 7/16/2021 2228 by Lori Gray RN  Body Position: (up in chair) other (see comments)  Taken 7/16/2021 2103 by Lori Gray RN  Body Position: (in chair) other (see comments)  Taken 7/16/2021 2045 by Lori Gray RN  Body Position: (up in chair) other (see comments)     Problem: Adult Inpatient Plan of Care  Goal: Optimal Comfort and Wellbeing  Outcome: Ongoing, Progressing     Problem: Adult Inpatient Plan of Care  Goal: Readiness for Transition of Care  Outcome: Ongoing, Progressing   Goal Outcome Evaluation:  Plan of Care Reviewed With: patient           Outcome Summary: pt vs stable, pain seems to be controlled, pt up to bathroom with assist and walker tolerated well.

## 2021-07-17 NOTE — SIGNIFICANT NOTE
07/17/21 0842   OTHER   Discipline occupational therapist   Rehab Time/Intention   Session Not Performed other (see comments);patient unavailable for treatment  (Patient at dialysis.)   Recommendation   OT - Next Appointment 07/17/21

## 2021-07-17 NOTE — PROGRESS NOTES
[facility]  INPATIENT PROGRESS NOTE    Length of Stay: 1  Date of Admission: 7/15/2021  Primary Care Physician: Luis Miguel Orourke MD    Subjective   Chief Complaint: No complaints    HPI:       7/17/82021: Patient has only asked for 1 morphine shot today but states he is having a lot of breakthrough pain.  States he is unable to lay in the bed due to pain and slept in his recliner last night.    7/16/2021: Patient continues to have uncontrolled pain.  He is unable to walk secondary to leg weakness at this time.    This is a 70-year-old male with past medical history of hypertension, dyslipidemia, end-stage renal disease on hemodialysis (T, TH & Sat), AAA (status post repair) and multiple myeloma that [facility] with complaints of increasing back pain.  The patient was recently diagnosed with multiple myeloma with CT scan showing sclerosis and lytic lesion.  The patient follows with Dr. Cartwright at the [facility].  He is scheduled to undergo second opinion with Dr. Pleitez at [place] during the last week of July.      Review of Systems   Constitutional: Negative for activity change and fatigue.   HENT: Negative for ear pain and sore throat.    Eyes: Negative for pain and discharge.   Respiratory: Negative for cough and shortness of breath.    Cardiovascular: Negative for chest pain and palpitations.   Gastrointestinal: Negative for abdominal pain and nausea.   Endocrine: Negative for cold intolerance and heat intolerance.   Genitourinary: Negative for difficulty urinating and dysuria.   Musculoskeletal: Positive for back pain. Negative for arthralgias and gait problem.   Skin: Negative for color change and rash.   Neurological: Negative for dizziness and weakness.   Psychiatric/Behavioral: Negative for agitation and confusion.        Objective    Temp:  [97.3 °F (36.3 °C)-97.5 °F (36.4 °C)] 97.3 °F (36.3 °C)  Heart Rate:   [72-92] 81  Resp:  [16] 16  BP: (126-163)/(58-77) 143/65    Physical Exam  Constitutional:       Appearance: He is well-developed.   HENT:      Head: Normocephalic and atraumatic.   Eyes:      Pupils: Pupils are equal, round, and reactive to light.   Cardiovascular:      Rate and Rhythm: Normal rate and regular rhythm.   Pulmonary:      Effort: Pulmonary effort is normal.      Breath sounds: Normal breath sounds.   Abdominal:      General: Bowel sounds are normal.      Palpations: Abdomen is soft.   Musculoskeletal:         General: Normal range of motion.      Cervical back: Normal range of motion and neck supple.   Skin:     General: Skin is warm and dry.   Neurological:      Mental Status: He is alert and oriented to person, place, and time.   Psychiatric:         Behavior: Behavior normal.       Results Review:  I have reviewed the labs, radiology results, and diagnostic studies.    Laboratory Data:   Results from last 7 days   Lab Units 07/17/21  0544 07/16/21  0618 07/15/21  0525   SODIUM mmol/L 129* 131* 135*   POTASSIUM mmol/L 4.7 4.2 4.2   CHLORIDE mmol/L 89* 94* 96*   CO2 mmol/L 29.0 29.0 29.0   BUN mg/dL 64* 38* 60*   CREATININE mg/dL 5.94* 4.52* 6.32*   GLUCOSE mg/dL 128* 84 122*   CALCIUM mg/dL 10.3 10.5 10.3   BILIRUBIN mg/dL  --   --  0.3   ALK PHOS U/L  --   --  67   ALT (SGPT) U/L  --   --  7   AST (SGOT) U/L  --   --  10   ANION GAP mmol/L 11.0 8.0 10.0     Estimated Creatinine Clearance: 14.1 mL/min (A) (by C-G formula based on SCr of 5.94 mg/dL (H)).          Results from last 7 days   Lab Units 07/17/21  0544 07/16/21  0618 07/15/21  0525   WBC 10*3/mm3 6.10 7.51 10.43   HEMOGLOBIN g/dL 8.4* 8.7* 7.8*   HEMATOCRIT % 26.4* 27.6* 24.4*   PLATELETS 10*3/mm3 273 284 276           Culture Data:   No results found for: BLOODCX  No results found for: URINECX  No results found for: RESPCX  No results found for: WOUNDCX  No results found for: STOOLCX  No components found for: BODYFLD    Radiology Data:      Imaging Results (Last 24 Hours)     ** No results found for the last 24 hours. **          I have reviewed the patient's current medications.     Assessment/Plan     Active Hospital Problems    Diagnosis  POA   • **Metastatic multiple myeloma to bone (CMS/[facility]) [C90.00]  Yes   • ESRF (end stage renal failure) (CMS/[facility]) [N18.6]  Yes       Plan:     1.  Back pain secondary to multiple myeloma: Oncology consult appreciated.  Patient educated to call for breakthrough pain medication so we can get his scheduled meds appropriately dosed.  PT/OT.  2.  Stage renal disease on hemodialysis: Nephrology consult appreciated.  3.  Hypertension: Continue home amlodipine, hydralazine and metoprolol.  4.  Anemia, chronic: Hemoglobin today is 8.4.  We will continue to monitor.  5.  Hypothyroidism: Continue home Synthroid.  6.  Hyperlipidemia: Continue home statin.  7.  Hyponatremia: Nephrology consult appreciated.    Discharge Planning: I expect patient to be discharged to home in 1-2 days.    I confirmed that the patient's Advance Care Plan is present, code status is documented, or surrogate decision maker is listed in the patient's medical record.      I have utilized all available immediate resources to obtain, update, or review the patient's current medications.         This document has been electronically signed by DELORIS Sharp on July 17, 2021 18:15 CDT

## 2021-07-17 NOTE — PROGRESS NOTES
HISTORY OF PRESENT ILLNESS:    States pain is better controlled. Had a spike in pain earlier today requiring morphine 4 mg IV x1. States he was able to sleep better last night. Denies any bleeding.        PAST MEDICAL HISTORY:    Past Medical History:   Diagnosis Date   • Abdominal aortic aneurysm (CMS/HCC)    • Allergic rhinitis    • BPH (benign prostatic hyperplasia)    • Carotid artery stenosis    • Chronic anemia    • Diverticulitis    • ESRD (end stage renal disease) on dialysis (CMS/HCC)    • History of transfusion    • Hypercholesteremia    • Hypertension    • Hypothyroidism    • Malignant tumor of Meckel's diverticulum (CMS/HCC)    • Multiple myeloma (CMS/HCC)    • Nephrolithiasis    • Osteoarthritis    • Rheumatic fever    • Secondary hyperparathyroidism of renal origin (CMS/HCC)    • Stroke (CMS/HCC)        SOCIAL HISTORY:    Social History     Tobacco Use   • Smoking status: Never Smoker   • Smokeless tobacco: Never Used   Vaping Use   • Vaping Use: Never used   Substance Use Topics   • Alcohol use: No   • Drug use: No       Surgical History :  Past Surgical History:   Procedure Laterality Date   • ABDOMINAL AORTIC ANEURYSM REPAIR     • ARTERIOVENOUS FISTULA Left    • ARTERIOVENOUS FISTULA/SHUNT SURGERY Left 6/17/2020   • COLONOSCOPY N/A 6/28/2021    Procedure: COLONOSCOPY;  Surgeon: Iggy El DO;  Location: [facility] ENDOSCOPY;  Service: Gastroenterology;  Laterality: N/A;  argon to avms right colon   • ENDOSCOPY N/A 6/28/2021    Procedure: ESOPHAGOGASTRODUODENOSCOPY;  Surgeon: Iggy El DO;  Location: [facility] ENDOSCOPY;  Service: Gastroenterology;  Laterality: N/A;   • INTERVENTIONAL RADIOLOGY PROCEDURE N/A 8/23/2017   • INTERVENTIONAL RADIOLOGY PROCEDURE Left 6/5/2020   • INTERVENTIONAL RADIOLOGY PROCEDURE Left 8/27/2020   • TESTICLE TORSION REPAIR N/A        ALLERGIES:    Allergies   Allergen Reactions   • Dilaudid [Hydromorphone Hcl] Mental Status Change   • Tape Rash     Just  "plastic tape         FAMILY HISTORY:  Family History   Problem Relation Age of Onset   • Heart failure Father            REVIEW OF SYSTEMS:      CONSTITUTIONAL:  Complains of fatigue. Denies any fever, chills or weight loss.     EYES: No visual disturbances. No discharge. No new lesions    ENMT:  No epistaxis, mouth sores or difficulty swallowing.    RESPIRATORY:  No new shortness of breath. No new cough or hemoptysis.    CARDIOVASCULAR:  No chest pain or palpitations.    GASTROINTESTINAL:  No abdominal pain nausea, vomiting or blood in the stool.    GENITOURINARY: No Dysuria or Hematuria.    MUSCULOSKELETAL: States his back pain and hip pain is improved since new pain medication. Positive for arthritis.    LYMPHATICS:  Denies any abnormal swollen glands anywhere in the body.    NEUROLOGICAL : Positive for tingling and numbness affecting bilateral lower extremity. No headache or dizziness. No seizures or balance problems.    SKIN: No new skin lesions.    ENDOCRINE : No new hot or cold intolerance. No new polyuria . No polydipsia.        PHYSICAL EXAMINATION:      VITAL SIGNS:  /77 (BP Location: Right arm, Patient Position: Lying)   Pulse 84   Temp 97.4 °F (36.3 °C) (Temporal)   Resp 16   Ht 180.3 cm (71\")   Wt 109 kg (241 lb 4 oz)   SpO2 93%   BMI 33.65 kg/m²       07/15/21  1725 07/16/21  0558 07/17/21  0625   Weight: 107 kg (235 lb 9.6 oz) 107 kg (236 lb 9.6 oz) 109 kg (241 lb 4 oz)             CONSTITUTIONAL:  Not in any distress.    EYES: Mild conjunctival Pallor. No Icterus. No Pterygium. Extraocular Movements intact.No ptosis.    ENMT:  Normocephalic, Atraumatic.No Facial Asymmetry noted.    NECK:  No adenopathy.Trachea midline. NO JVD.    RESPIRATORY: Diminished air entry bilateral base. No rhonchi or wheezing.Fair respiratory effort.    CARDIOVASCULAR:  S1, S2. Regular rate and rhythm. No murmur or gallop appreciated.    ABDOMEN:  Soft, obese, nontender. Bowel sounds present in all four " quadrants.  No Hepatosplenomegaly appreciated.    MUSCULOSKELETAL:  Trace edema.No Calf Tenderness.Decreased range of motion.    NEUROLOGIC:    No  Motor  deficit appreciated. Cranial Nerves 2-12 grossly intact.    SKIN : No new skin lesion identified. Skin is warm and moist to touch.    LYMPHATICS: No new enlarged lymph nodes in neck or supraclavicular area.    PSYCHIATRY: Alert, awake and oriented ×3.          DIAGNOSTIC DATA:    Glucose   Date Value Ref Range Status   07/17/2021 128 (H) 65 - 99 mg/dL Final     Sodium   Date Value Ref Range Status   07/17/2021 129 (L) 136 - 145 mmol/L Final     Potassium   Date Value Ref Range Status   07/17/2021 4.7 3.5 - 5.2 mmol/L Final     Comment:     Slight hemolysis detected by analyzer. Results may be affected.     CO2   Date Value Ref Range Status   07/17/2021 29.0 22.0 - 29.0 mmol/L Final     Chloride   Date Value Ref Range Status   07/17/2021 89 (L) 98 - 107 mmol/L Final     Anion Gap   Date Value Ref Range Status   07/17/2021 11.0 5.0 - 15.0 mmol/L Final     Creatinine   Date Value Ref Range Status   07/17/2021 5.94 (H) 0.76 - 1.27 mg/dL Final     BUN   Date Value Ref Range Status   07/17/2021 64 (H) 8 - 23 mg/dL Final     BUN/Creatinine Ratio   Date Value Ref Range Status   07/17/2021 10.8 7.0 - 25.0 Final     Calcium   Date Value Ref Range Status   07/17/2021 10.3 8.6 - 10.5 mg/dL Final     eGFR Non  Amer   Date Value Ref Range Status   07/17/2021 9 (L) >60 mL/min/1.73 Final     Comment:     <15 Indicative of kidney failure.     Alkaline Phosphatase   Date Value Ref Range Status   07/15/2021 67 39 - 117 U/L Final     Total Protein   Date Value Ref Range Status   07/15/2021 8.3 6.0 - 8.5 g/dL Final     ALT (SGPT)   Date Value Ref Range Status   07/15/2021 7 1 - 41 U/L Final     AST (SGOT)   Date Value Ref Range Status   07/15/2021 10 1 - 40 U/L Final     Total Bilirubin   Date Value Ref Range Status   07/15/2021 0.3 0.0 - 1.2 mg/dL Final     Albumin   Date  Value Ref Range Status   07/15/2021 3.70 3.50 - 5.20 g/dL Final     Globulin   Date Value Ref Range Status   07/15/2021 4.6 gm/dL Final     Lab Results   Component Value Date    WBC 6.10 07/17/2021    HGB 8.4 (L) 07/17/2021    HCT 26.4 (L) 07/17/2021    .7 (H) 07/17/2021     07/17/2021     Lab Results   Component Value Date    NEUTROABS 8.70 (H) 07/15/2021    IRON 71 07/07/2021    TIBC 297 (L) 07/07/2021    LABIRON 24 07/07/2021    FERRITIN 691.00 (H) 07/07/2021    [id number] >2,000 (H) 06/08/2021    FOLATE 5.97 06/08/2021     No results found for: , LABCA2, AFPTM, HCGQUANT, , CHROMGRNA, [id number], CEA, REFLABREPO        PATHOLOGY:  * Cannot find OR log *         RADIOLOGY DATA :  XR Bone Survey Limited    Result Date: 7/11/2021  Diffusely mottled appearance of the appendicular and axial skeleton bone marrow as described above. This would be consistent with multiple myeloma. Electronically signed by:  Jose Ambriz MD  7/11/2021 10:42 AM CDT Workstation: [id number]    CT Lumbar Spine Without Contrast    Result Date: 7/15/2021  1.  Interval development of a markedly heterogenous appearance of the vertebral bodies as well as the osseous structures of the pelvis including the sacrum. Multiple lytic lesions are noted. Findings are consistent with the patient's history of multiple myeloma. 2.  Degenerative discogenic changes of the lumbar spine greatest at L4-5 where there is a diffuse annular bulge with facet arthrosis and ligamentum flavum hypertrophy resulting in mild to moderate central canal stenosis and subarticular recess narrowing. Electronically signed by:  Marielos Pimentel DO  7/15/2021 6:28 AM CDT Workstation: [id number]    XR Chest 1 View    Result Date: 7/15/2021  Cardiomegaly. Small left basilar density near the costophrenic sulcus which could represent small left pleural effusion as well as atelectasis. Electronically signed by:  Jose Armando Doty DO  7/15/2021 6:16 AM CDT  Workstation: [id number]        ASSESSMENT AND PLAN:      1. Multiple myeloma:  -Patient and his wife is agreeable to start chemotherapy now  -Plan is to start patient on reduced dose of Cytoxan, Velcade and dexamethasone as outpatient  -Hemoglobin is 8.4  -Has been started on dexamethasone 20 mg p.o. daily for 3 days since July 16, 2021    2. Anemia:  -Multifactorial:  -Hemoglobin is 8.4  -We'll transfuse as needed if hemoglobin is less than 7    3. Cancer related pain:  -Patient has a severe back pain and hip pain from lytic lesion caused by multiple myeloma  -Currently on fentanyl 12 mcg patch. Has been changed to oxycodone 10 mg every 4 hours around-the-clock yesterday on July 16, 2021.  -States his pain is improved but still required intravenous morphine 4 mg IV x1 earlier today  -Recommend increasing fentanyl patch 25 mcg starting today. Recommend continue with oxycodone 10 mg every 4 hours.  -If pain is controlled tomorrow, okay to discharge patient home tomorrow  -Continue with bowel regimen to prevent constipation    4. End-stage renal disease on hemodialysis    5. Hypertension    6. Dyslipidemia        Vern Cartwright MD  7/17/2021  10:36 CDT        Part of this note may be an electronic transcription/translation of spoken language to printed text using the Dragon Dictation System.

## 2021-07-17 NOTE — SIGNIFICANT NOTE
07/17/21 1444   OTHER   Discipline occupational therapist   Rehab Time/Intention   Session Not Performed other (see comments);patient unavailable for treatment  (Second OT attempt. Patient working with PT.)   Recommendation   OT - Next Appointment 07/17/21

## 2021-07-17 NOTE — PROGRESS NOTES
"[facility]  [address] [place] [address]. [address]  T - [phone]  F - [phone]     Progress Note          PATIENT  DEMOGRAPHICS   PATIENT NAME: Jesús Austin                      PHYSICIAN: DELORIS Laws  : 1950  MRN: [id number]   LOS: 1 day    Patient Care Team:  Luis Miguel Orourke MD as PCP - General (Family Medicine)  Vern Cartwright MD as Consulting Physician (Hematology and Oncology)  Subjective   SUBJECTIVE   Pain improves with meds but then once med effect wears off it comes back.  Improved on fentanyl.         Objective   OBJECTIVE   Vital Signs  Temp:  [97.2 °F (36.2 °C)-97.5 °F (36.4 °C)] 97.4 °F (36.3 °C)  Heart Rate:  [77-92] 84  Resp:  [16-18] 16  BP: (138-163)/(58-77) 163/77    Flowsheet Rows      First Filed Value   Admission Height  177.8 cm (70\") Documented at 07/15/2021 0836   Admission Weight  109 kg (240 lb 12.8 oz) Documented at 07/15/2021 0836           I/O last 3 completed shifts:  In: 840 [P.O.:840]  Out: 400 [Urine:400]    PHYSICAL EXAM    Physical Exam  Constitutional:       Appearance: He is well-developed.   HENT:      Head: Normocephalic.   Eyes:      Pupils: Pupils are equal, round, and reactive to light.   Cardiovascular:      Rate and Rhythm: Normal rate and regular rhythm.      Heart sounds: Normal heart sounds.   Pulmonary:      Effort: Pulmonary effort is normal.      Breath sounds: Normal breath sounds.   Abdominal:      General: Abdomen is flat. Bowel sounds are normal.      Palpations: Abdomen is soft.   Musculoskeletal:         General: No swelling.   Neurological:      General: No focal deficit present.      Mental Status: He is alert and oriented to person, place, and time.         RESULTS   Results Review:    Results from last 7 days   Lab Units 21  0544 21  0618 07/15/21  0525   SODIUM mmol/L 129* 131* 135*   POTASSIUM mmol/L 4.7 4.2 4.2   CHLORIDE mmol/L 89* 94* 96*   CO2 mmol/L 29.0 29.0 29.0   BUN mg/dL 64* 38* " 60*   CREATININE mg/dL 5.94* 4.52* 6.32*   CALCIUM mg/dL 10.3 10.5 10.3   BILIRUBIN mg/dL  --   --  0.3   ALK PHOS U/L  --   --  67   ALT (SGPT) U/L  --   --  7   AST (SGOT) U/L  --   --  10   GLUCOSE mg/dL 128* 84 122*       Estimated Creatinine Clearance: 14.1 mL/min (A) (by C-G formula based on SCr of 5.94 mg/dL (H)).                Results from last 7 days   Lab Units 07/17/21  0544 07/16/21  0618 07/15/21  0525   WBC 10*3/mm3 6.10 7.51 10.43   HEMOGLOBIN g/dL 8.4* 8.7* 7.8*   PLATELETS 10*3/mm3 273 284 276               Imaging Results (Last 24 Hours)     ** No results found for the last 24 hours. **           MEDICATIONS    allopurinol, 100 mg, Oral, Daily  amitriptyline, 25 mg, Oral, Nightly  amLODIPine, 10 mg, Oral, Q24H  aspirin, 81 mg, Oral, Daily  bisacodyl, 10 mg, Oral, Daily  fentaNYL, 1 patch, Transdermal, Q72H   And  Check Fentanyl Patch Placement, 1 each, Does not apply, Q12H  dexamethasone, 20 mg, Oral, Daily With Breakfast  docusate sodium, 100 mg, Oral, BID  folic acid, 1 mg, Oral, Daily  heparin (porcine), 5,000 Units, Subcutaneous, Q8H  hydrALAZINE, 25 mg, Oral, Q8H  levothyroxine, 50 mcg, Oral, Daily  lidocaine, 2 patch, Transdermal, Q24H  metoprolol tartrate, 25 mg, Oral, Nightly  oxyCODONE, 10 mg, Oral, Q4H  pantoprazole, 40 mg, Oral, QAM  polyethylene glycol, 17 g, Oral, Daily  rosuvastatin, 10 mg, Oral, Nightly  sevelamer, 2,400 mg, Oral, TID With Meals  sodium chloride, 10 mL, Intravenous, Q12H  sodium polystyrene, 15 g, Oral, Daily           Assessment/Plan   ASSESSMENT / PLAN      Metastatic multiple myeloma to bone (CMS/HCC)    ESRF (end stage renal failure) (CMS/HCC)    1.  ESRD on HD- normally dialyzes on TTS at [facility] in [place]. Seen on HD today, tolerating well.    2.  Acute on chronic anemia- hemoglobin currently stable      3.  Hypertension- fair control, improving with pain control.     4.  Skeletal lesions/multiple myeloma/ pain - undergoing outpatient evaluation for  multiple myeloma, has plans for evaluation at [facility]. Also under Dr Cartwright. Morphine has helped him with pain. Now on fentanyl and pain is improved. Plans to start chemo.     5.  CKD-MBD- continue Renvela           This document has been electronically signed by DELORIS Laws on July 17, 2021 10:01 CDT

## 2021-07-18 NOTE — THERAPY TREATMENT NOTE
Acute Care - Physical Therapy Treatment Note  [facility]     Patient Name: Jesús Austin  : 1950  MRN: [id number]  Today's Date: 2021           PT Assessment (last 12 hours)      PT Evaluation and Treatment    No documentation.       Physical Therapy Education                 Title: PT OT SLP Therapies (In Progress)     Topic: Physical Therapy (Done)     Point: Mobility training (Done)     Learning Progress Summary           Patient Acceptance, D,E, DU,VU by ORA at 2021    Comment: Application and use of LSO and rib belt, transfers, sit to stand and gait w/ FWRW to avoid fwd bending or rotation; keep back straight for posture, mobilty and rest;    Acceptance, D,E, DU,NR,VU by ORA at 2021 131    Comment: POC; use of LSO w/ rib belt till TLSO available if still needed; spine protection   Family Acceptance, D,E, DU,VU by ORA at 2021    Comment: Application and use of LSO and rib belt, transfers, sit to stand and gait w/ FWRW to avoid fwd bending or rotation; keep back straight for posture, mobilty and rest;                   Point: Home exercise program (Done)     Learning Progress Summary           Patient Acceptance, D,E, DU,VU by ORA at 2021    Comment: Application and use of LSO and rib belt, transfers, sit to stand and gait w/ FWRW to avoid fwd bending or rotation; keep back straight for posture, mobilty and rest;    Acceptance, D,E, DU,NR,VU by ORA at 2021 131    Comment: POC; use of LSO w/ rib belt till TLSO available if still needed; spine protection   Family Acceptance, D,E, DU,VU by ORA at 2021    Comment: Application and use of LSO and rib belt, transfers, sit to stand and gait w/ FWRW to avoid fwd bending or rotation; keep back straight for posture, mobilty and rest;                   Point: Body mechanics (Done)     Learning Progress Summary           Patient Acceptance, D,E, DU,VU by ORA at 2021    Comment: Application and use of  LSO and rib belt, transfers, sit to stand and gait w/ FWRW to avoid fwd bending or rotation; keep back straight for posture, mobilty and rest;    Acceptance, D,E, DU,NR,VU by  at 7/16/2021 1312    Comment: POC; use of LSO w/ rib belt till TLSO available if still needed; spine protection   Family Acceptance, D,E, DU,VU by  at 7/16/2021 1707    Comment: Application and use of LSO and rib belt, transfers, sit to stand and gait w/ FWRW to avoid fwd bending or rotation; keep back straight for posture, mobilty and rest;                   Point: Precautions (Done)     Learning Progress Summary           Patient Acceptance, D,E, DU,VU by  at 7/16/2021 1707    Comment: Application and use of LSO and rib belt, transfers, sit to stand and gait w/ FWRW to avoid fwd bending or rotation; keep back straight for posture, mobilty and rest;    Acceptance, D,E, DU,NR,VU by  at 7/16/2021 1312    Comment: POC; use of LSO w/ rib belt till TLSO available if still needed; spine protection   Family Acceptance, D,E, DU,VU by  at 7/16/2021 1707    Comment: Application and use of LSO and rib belt, transfers, sit to stand and gait w/ FWRW to avoid fwd bending or rotation; keep back straight for posture, mobilty and rest;                               User Key     Initials Effective Dates Name Provider Type Discipline     06/16/21 -  Paula Quintana, PT Physical Therapist PT              PT Recommendation and Plan     Plan of Care Reviewed With: patient, spouse  Outcome Summary: sit-stand-sit min/cga of 1-lso,rib brace donned prior to stand;amb 50,60' with rw and cga of 1 and wife following with chair  Outcome Measures     Row Name 07/16/21 1700 07/16/21 1300          How much help from another person do you currently need...    Turning from your back to your side while in flat bed without using bedrails?  3  -GB  3  -GB     Moving from lying on back to sitting on the side of a flat bed without bedrails?  2  -GB  2  -GB      Moving to and from a bed to a chair (including a wheelchair)?  2  -GB  1  -GB     Standing up from a chair using your arms (e.g., wheelchair, bedside chair)?  2  -GB  1  -GB     Climbing 3-5 steps with a railing?  2  -GB  1  -GB     To walk in hospital room?  2  -GB  1  -GB     AM-PAC 6 Clicks Score (PT)  13  -GB  9  -GB        Functional Assessment    Outcome Measure Options  AM-PAC 6 Clicks Basic Mobility (PT)  -GB  AM-PAC 6 Clicks Basic Mobility (PT)  -GB       User Key  (r) = Recorded By, (t) = Taken By, (c) = Cosigned By    Initials Name Provider Type    GB Paula Quintana, PT Physical Therapist           Time Calculation:   PT Charges     Row Name 07/18/21 0813             Time Calculation    Start Time  1430  -LN      Stop Time  1500  -LN      Time Calculation (min)  30 min  -LN      PT Received On  07/18/21  -LN         Time Calculation- PT    Total Timed Code Minutes- PT  30 minute(s)  -LN        User Key  (r) = Recorded By, (t) = Taken By, (c) = Cosigned By    Initials Name Provider Type    LN DulceGabriela S, PTA Physical Therapy Assistant        Therapy Charges for Today     Code Description Service Date Service Provider Modifiers Qty    68662281438 HC PT THERAPEUTIC ACT EA 15 MIN 7/17/2021 Dulce, Gabriela S, PTA GP 1    25029507253 HC GAIT TRAINING EA 15 MIN 7/17/2021 Dulce, Gabriela S, PTA GP 1    65690175758 HC PT THERAPEUTIC ACT EA 15 MIN 7/18/2021 Dulce Gabriela S, PTA GP 1    23922362183 HC GAIT TRAINING EA 15 MIN 7/18/2021 Dulce Gabriela S, PTA GP 1          PT G-Codes  Outcome Measure Options: AM-PAC 6 Clicks Basic Mobility (PT)  AM-PAC 6 Clicks Score (PT): 13  AM-PAC 6 Clicks Score (OT): 16    Gabriela S Dulce PTA  7/18/2021

## 2021-07-18 NOTE — SIGNIFICANT NOTE
07/18/21 1008   OTHER   Discipline occupational therapy assistant   Rehab Time/Intention   Session Not Performed patient/family declined treatment   Pt in recliner moaning on and off with pain in legs   family stated pt had a bad night and is exp pain  pt family declined therapy at this time.  Family feels pt is declining at this point

## 2021-07-18 NOTE — SIGNIFICANT NOTE
07/18/21 1259   OTHER   Discipline physical therapy assistant   Rehab Time/Intention   Session Not Performed unable to treat, medical status change  (aprn has d/c'ed PT/OT)

## 2021-07-18 NOTE — PLAN OF CARE
Goal Outcome Evaluation:   Patient code status changed to Comfort Measures Only today. Patient has slept a lot today with spouse at bedside.

## 2021-07-18 NOTE — PROGRESS NOTES
"[facility]  [address] [place] [address]. [address]  T - [phone]  F - [phone]     Progress Note          PATIENT  DEMOGRAPHICS   PATIENT NAME: Jesús Austin                      PHYSICIAN: DELORIS Laws  : 1950  MRN: [id number]   LOS: 2 days    Patient Care Team:  Luis Miguel Orourke MD as PCP - General (Family Medicine)  Vern Cartwright MD as Consulting Physician (Hematology and Oncology)  Subjective   SUBJECTIVE   He had significantly more pain overnight, more lethargic and disoriented today.         Objective   OBJECTIVE   Vital Signs  Temp:  [96.3 °F (35.7 °C)-97.8 °F (36.6 °C)] 97.8 °F (36.6 °C)  Heart Rate:  [72-89] 89  Resp:  [8-20] 16  BP: (126-185)/(65-88) 185/88    Flowsheet Rows      First Filed Value   Admission Height  177.8 cm (70\") Documented at 07/15/2021 0836   Admission Weight  109 kg (240 lb 12.8 oz) Documented at 07/15/2021 0836           I/O last 3 completed shifts:  In: 1317 [P.O.:1317]  Out: 1850 [Urine:350; Other:1500]    PHYSICAL EXAM    Physical Exam  Constitutional:       Appearance: He is well-developed.   HENT:      Head: Normocephalic.   Eyes:      Pupils: Pupils are equal, round, and reactive to light.   Cardiovascular:      Rate and Rhythm: Normal rate and regular rhythm.      Heart sounds: Normal heart sounds.   Pulmonary:      Effort: Pulmonary effort is normal.      Breath sounds: Normal breath sounds.   Abdominal:      General: Abdomen is flat. Bowel sounds are normal.      Palpations: Abdomen is soft.   Musculoskeletal:         General: No swelling.   Neurological:      General: No focal deficit present.      Mental Status: He is disoriented.         RESULTS   Results Review:    Results from last 7 days   Lab Units 21  0232 21  0544 21  0618 07/15/21  0525   SODIUM mmol/L 131* 129* 131* 135*   POTASSIUM mmol/L 4.3 4.7 4.2 4.2   CHLORIDE mmol/L 90* 89* 94* 96*   CO2 mmol/L 30.0* 29.0 29.0 29.0   BUN mg/dL 50* 64* 38* " 60*   CREATININE mg/dL 4.46* 5.94* 4.52* 6.32*   CALCIUM mg/dL 10.5 10.3 10.5 10.3   BILIRUBIN mg/dL  --   --   --  0.3   ALK PHOS U/L  --   --   --  67   ALT (SGPT) U/L  --   --   --  7   AST (SGOT) U/L  --   --   --  10   GLUCOSE mg/dL 141* 128* 84 122*       Estimated Creatinine Clearance: 18.9 mL/min (A) (by C-G formula based on SCr of 4.46 mg/dL (H)).                Results from last 7 days   Lab Units 07/18/21  0232 07/17/21  0544 07/16/21  0618 07/15/21  0525   WBC 10*3/mm3 9.39 6.10 7.51 10.43   HEMOGLOBIN g/dL 8.1* 8.4* 8.7* 7.8*   PLATELETS 10*3/mm3 365 273 284 276               Imaging Results (Last 24 Hours)     ** No results found for the last 24 hours. **           MEDICATIONS    allopurinol, 100 mg, Oral, Daily  amitriptyline, 25 mg, Oral, Nightly  amLODIPine, 10 mg, Oral, Q24H  aspirin, 81 mg, Oral, Daily  bisacodyl, 10 mg, Oral, Daily  Check Fentanyl Patch Placement, 1 each, Does not apply, Q12H  dexamethasone, 20 mg, Oral, Daily With Breakfast  docusate sodium, 100 mg, Oral, BID  fentaNYL, 1 patch, Transdermal, Q72H  folic acid, 1 mg, Oral, Daily  heparin (porcine), 5,000 Units, Subcutaneous, Q8H  hydrALAZINE, 25 mg, Oral, Q8H  levothyroxine, 50 mcg, Oral, Daily  lidocaine, 2 patch, Transdermal, Q24H  metoprolol tartrate, 25 mg, Oral, Nightly  oxyCODONE, 10 mg, Oral, Q4H  pantoprazole, 40 mg, Oral, QAM  polyethylene glycol, 17 g, Oral, Daily  rosuvastatin, 10 mg, Oral, Nightly  sevelamer, 2,400 mg, Oral, TID With Meals  sodium chloride, 10 mL, Intravenous, Q12H  sodium polystyrene, 15 g, Oral, Daily           Assessment/Plan   ASSESSMENT / PLAN      Metastatic multiple myeloma to bone (CMS/HCC)    ESRF (end stage renal failure) (CMS/HCC)    1.  ESRD on HD- normally dialyzes on TTS at [facility] in [place]. They now plan to enter hospice and will be stopping HD.    2.  Acute on chronic anemia- hemoglobin currently stable      3.  Hypertension- fair control     4.  Skeletal lesions/multiple  myeloma/ pain - undergoing outpatient evaluation for multiple myeloma, has plans for evaluation at [facility]. Also under Dr Cartwright. Morphine has helped him with pain. Now on fentanyl and pain is improved.     -Now plans to start hospice and stop plans for chemo/[facility] consultation.     5.  CKD-MBD           This document has been electronically signed by DELORIS Laws on July 18, 2021 10:11 CDT

## 2021-07-18 NOTE — SIGNIFICANT NOTE
07/18/21 1255   OTHER   Discipline physical therapy assistant   Rehab Time/Intention   Session Not Performed patient/family declined, not feeling well  (wife states increased pain and declines therapy for today;wife states if he gets feeling better she will have nsg call us)

## 2021-07-18 NOTE — PLAN OF CARE
Problem: Fall Injury Risk  Goal: Absence of Fall and Fall-Related Injury  Outcome: Ongoing, Progressing     Problem: Fall Injury Risk  Goal: Absence of Fall and Fall-Related Injury  Intervention: Promote Injury-Free Environment  Recent Flowsheet Documentation  Taken 7/18/2021 0359 by Lori Gray RN  Safety Promotion/Fall Prevention:   safety round/check completed   nonskid shoes/slippers when out of bed  Taken 7/18/2021 0203 by Lori Gray RN  Safety Promotion/Fall Prevention:   safety round/check completed   nonskid shoes/slippers when out of bed  Taken 7/18/2021 0023 by Lori Gray RN  Safety Promotion/Fall Prevention:   safety round/check completed   muscle strengthening facilitated  Taken 7/17/2021 2357 by Lori Gray RN  Safety Promotion/Fall Prevention:   safety round/check completed   nonskid shoes/slippers when out of bed  Taken 7/17/2021 2208 by Lori Gray RN  Safety Promotion/Fall Prevention:   safety round/check completed   nonskid shoes/slippers when out of bed  Taken 7/17/2021 2121 by Lori Gray RN  Safety Promotion/Fall Prevention:   safety round/check completed   nonskid shoes/slippers when out of bed  Taken 7/17/2021 2020 by Lori Gray RN  Safety Promotion/Fall Prevention:   activity supervised   assistive device/personal items within reach   clutter free environment maintained   fall prevention program maintained   safety round/check completed   nonskid shoes/slippers when out of bed  Taken 7/17/2021 1930 by Lori Gray RN  Safety Promotion/Fall Prevention:   safety round/check completed   nonskid shoes/slippers when out of bed     Problem: Adult Inpatient Plan of Care  Goal: Plan of Care Review  Outcome: Ongoing, Progressing  Flowsheets  Taken 7/18/2021 0444  Progress: declining  Outcome Summary: pt bp elevated, pain not controlled giving prn morphine q 2 hours.  Taken 7/17/2021 0331  Plan of Care Reviewed With: patient      Problem: Adult Inpatient Plan of Care  Goal: Patient-Specific Goal (Individualized)  Outcome: Ongoing, Progressing     Problem: Adult Inpatient Plan of Care  Goal: Absence of Hospital-Acquired Illness or Injury  Outcome: Ongoing, Progressing     Problem: Adult Inpatient Plan of Care  Goal: Absence of Hospital-Acquired Illness or Injury  Intervention: Identify and Manage Fall Risk  Recent Flowsheet Documentation  Taken 7/18/2021 0359 by Lori Gray RN  Safety Promotion/Fall Prevention:   safety round/check completed   nonskid shoes/slippers when out of bed  Taken 7/18/2021 0203 by Lori Gray RN  Safety Promotion/Fall Prevention:   safety round/check completed   nonskid shoes/slippers when out of bed  Taken 7/18/2021 0023 by Lori Gray RN  Safety Promotion/Fall Prevention:   safety round/check completed   muscle strengthening facilitated  Taken 7/17/2021 2357 by Lori Gray RN  Safety Promotion/Fall Prevention:   safety round/check completed   nonskid shoes/slippers when out of bed  Taken 7/17/2021 2208 by Lori Gray RN  Safety Promotion/Fall Prevention:   safety round/check completed   nonskid shoes/slippers when out of bed  Taken 7/17/2021 2121 by Lori Gray RN  Safety Promotion/Fall Prevention:   safety round/check completed   nonskid shoes/slippers when out of bed  Taken 7/17/2021 2020 by Lori Gray RN  Safety Promotion/Fall Prevention:   activity supervised   assistive device/personal items within reach   clutter free environment maintained   fall prevention program maintained   safety round/check completed   nonskid shoes/slippers when out of bed  Taken 7/17/2021 1930 by Lori Gray RN  Safety Promotion/Fall Prevention:   safety round/check completed   nonskid shoes/slippers when out of bed     Problem: Adult Inpatient Plan of Care  Goal: Absence of Hospital-Acquired Illness or Injury  Intervention: Prevent Skin Injury  Recent Flowsheet  Documentation  Taken 7/18/2021 0359 by Lori Gray RN  Body Position: (in chair) other (see comments)  Taken 7/18/2021 0203 by Lori Gray RN  Body Position: (in chair) other (see comments)  Taken 7/18/2021 0023 by Lori Gray RN  Body Position: (inchair) other (see comments)  Taken 7/17/2021 2357 by Lori Gray RN  Body Position: other (see comments)  Taken 7/17/2021 2208 by Lori Gray RN  Body Position: (in chair) other (see comments)  Taken 7/17/2021 2121 by Lori Gray RN  Body Position: other (see comments)  Taken 7/17/2021 2020 by Lori Gray RN  Body Position: (inchair) other (see comments)     Problem: Adult Inpatient Plan of Care  Goal: Absence of Hospital-Acquired Illness or Injury  Intervention: Prevent and Manage VTE (venous thromboembolism) Risk  Recent Flowsheet Documentation  Taken 7/17/2021 2020 by Lori Gray RN  VTE Prevention/Management: (heparin) other (see comments)     Problem: Adult Inpatient Plan of Care  Goal: Optimal Comfort and Wellbeing  Outcome: Ongoing, Progressing     Problem: Adult Inpatient Plan of Care  Goal: Readiness for Transition of Care  Outcome: Ongoing, Progressing   Goal Outcome Evaluation:  Plan of Care Reviewed With: patient        Progress: declining  Outcome Summary: pt bp elevated, pain not controlled giving prn morphine q 2 hours.

## 2021-07-18 NOTE — PLAN OF CARE
Goal Outcome Evaluation:  Plan of Care Reviewed With: patient, spouse           Outcome Summary: sit-stand-sit min/cga of 1-lso,rib brace donned prior to stand;amb 50,60' with rw and cga of 1 and wife following with chair

## 2021-07-18 NOTE — PROGRESS NOTES
[facility]  INPATIENT PROGRESS NOTE    Length of Stay: 2  Date of Admission: 7/15/2021  Primary Care Physician: Luis Miguel Orourke MD    Subjective   Chief Complaint: No complaints    HPI:       7/18/2021: Per patient's wife, he had a very bad night. He required every 2 hour morphine with breakthrough pain. Due to the severity of pain, he and his wife have opted comfort measures.      7/17/82021: Patient has only asked for 1 morphine shot today but states he is having a lot of breakthrough pain.  States he is unable to lay in the bed due to pain and slept in his recliner last night.    7/16/2021: Patient continues to have uncontrolled pain.  He is unable to walk secondary to leg weakness at this time.    This is a 70-year-old male with past medical history of hypertension, dyslipidemia, end-stage renal disease on hemodialysis (T, TH & Sat), AAA (status post repair) and multiple myeloma that presented to [facility] on 7/15/2021 with complaints of increasing back pain.  The patient was recently diagnosed with multiple myeloma with CT scan showing sclerosis and lytic lesion.  The patient follows with Dr. Cartwright at the [facility] cancer [facility].  He is scheduled to undergo second opinion with Dr. Pleitez at [facility] during the last week of July.      Review of Systems   Constitutional: Negative for activity change and fatigue.   HENT: Negative for ear pain and sore throat.    Eyes: Negative for pain and discharge.   Respiratory: Negative for cough and shortness of breath.    Cardiovascular: Negative for chest pain and palpitations.   Gastrointestinal: Negative for abdominal pain and nausea.   Endocrine: Negative for cold intolerance and heat intolerance.   Genitourinary: Negative for difficulty urinating and dysuria.   Musculoskeletal: Positive for back pain. Negative for arthralgias and gait problem.   Skin: Negative for color change and rash.   Neurological:  Negative for dizziness and weakness.   Psychiatric/Behavioral: Negative for agitation and confusion.        Objective    Temp:  [96.3 °F (35.7 °C)-97.8 °F (36.6 °C)] 97.7 °F (36.5 °C)  Heart Rate:  [81-89] 88  Resp:  [8-20] 16  BP: (117-185)/(65-99) 117/99    Physical Exam  Constitutional:       Appearance: He is well-developed.   HENT:      Head: Normocephalic and atraumatic.   Eyes:      Pupils: Pupils are equal, round, and reactive to light.   Cardiovascular:      Rate and Rhythm: Normal rate and regular rhythm.   Pulmonary:      Effort: Pulmonary effort is normal.      Breath sounds: Normal breath sounds.   Abdominal:      General: Bowel sounds are normal.      Palpations: Abdomen is soft.   Musculoskeletal:         General: Normal range of motion.      Cervical back: Normal range of motion and neck supple.   Skin:     General: Skin is warm and dry.   Neurological:      Mental Status: He is alert and oriented to person, place, and time.   Psychiatric:         Behavior: Behavior normal.       Results Review:  I have reviewed the labs, radiology results, and diagnostic studies.    Laboratory Data:   Results from last 7 days   Lab Units 07/18/21  0232 07/17/21  0544 07/16/21  0618 07/15/21  0525   SODIUM mmol/L 131* 129* 131* 135*   POTASSIUM mmol/L 4.3 4.7 4.2 4.2   CHLORIDE mmol/L 90* 89* 94* 96*   CO2 mmol/L 30.0* 29.0 29.0 29.0   BUN mg/dL 50* 64* 38* 60*   CREATININE mg/dL 4.46* 5.94* 4.52* 6.32*   GLUCOSE mg/dL 141* 128* 84 122*   CALCIUM mg/dL 10.5 10.3 10.5 10.3   BILIRUBIN mg/dL  --   --   --  0.3   ALK PHOS U/L  --   --   --  67   ALT (SGPT) U/L  --   --   --  7   AST (SGOT) U/L  --   --   --  10   ANION GAP mmol/L 11.0 11.0 8.0 10.0     Estimated Creatinine Clearance: 18.9 mL/min (A) (by C-G formula based on SCr of 4.46 mg/dL (H)).          Results from last 7 days   Lab Units 07/18/21  0232 07/17/21  0544 07/16/21  0618 07/15/21  0525   WBC 10*3/mm3 9.39 6.10 7.51 10.43   HEMOGLOBIN g/dL 8.1* 8.4* 8.7*  7.8*   HEMATOCRIT % 25.4* 26.4* 27.6* 24.4*   PLATELETS 10*3/mm3 365 273 284 276           Culture Data:   No results found for: BLOODCX  No results found for: URINECX  No results found for: RESPCX  No results found for: WOUNDCX  No results found for: STOOLCX  No components found for: BODYFLD    Radiology Data:   Imaging Results (Last 24 Hours)     ** No results found for the last 24 hours. **          I have reviewed the patient's current medications.     Assessment/Plan     Active Hospital Problems    Diagnosis  POA   • **Metastatic multiple myeloma to bone (CMS/[facility]) [C90.00]  Yes   • ESRF (end stage renal failure) (CMS/[facility]) [N18.6]  Yes       Plan:    Multiple myeloma with metastasis to the bone: Patient changed to comfort measures. Case management consulted for GIP versus home with hospice.  If patient continues to have breakthrough pain, will start continuous PCA.      Discharge Planning: I expect patient to be discharged to home in 1-2 days.    I confirmed that the patient's Advance Care Plan is present, code status is documented, or surrogate decision maker is listed in the patient's medical record.      I have utilized all available immediate resources to obtain, update, or review the patient's current medications.         This document has been electronically signed by DELORIS Sharp on July 18, 2021 13:37 CDT

## 2021-07-18 NOTE — PROGRESS NOTES
HISTORY OF PRESENT ILLNESS:   Patient had uncontrolled pain since yesterday despite of increasing fentanyl to 25 mcg as well as oxycodone 10 mg every 4 hours around-the-clock.  Patient has required total 6 doses of intravenous morphine since yesterday.  At the time of examination today patient had received morphine prior to that and was disoriented at the time of exam.  As per wife he has been more confused and disoriented since yesterday.        PAST MEDICAL HISTORY:    Past Medical History:   Diagnosis Date   • Abdominal aortic aneurysm (CMS/HCC)    • Allergic rhinitis    • BPH (benign prostatic hyperplasia)    • Carotid artery stenosis    • Chronic anemia    • Diverticulitis    • ESRD (end stage renal disease) on dialysis (CMS/HCC)    • History of transfusion    • Hypercholesteremia    • Hypertension    • Hypothyroidism    • Malignant tumor of Meckel's diverticulum (CMS/HCC)    • Multiple myeloma (CMS/HCC)    • Nephrolithiasis    • Osteoarthritis    • Rheumatic fever    • Secondary hyperparathyroidism of renal origin (CMS/HCC)    • Stroke (CMS/HCC)        SOCIAL HISTORY:    Social History     Tobacco Use   • Smoking status: Never Smoker   • Smokeless tobacco: Never Used   Vaping Use   • Vaping Use: Never used   Substance Use Topics   • Alcohol use: No   • Drug use: No       Surgical History :  Past Surgical History:   Procedure Laterality Date   • ABDOMINAL AORTIC ANEURYSM REPAIR     • ARTERIOVENOUS FISTULA Left    • ARTERIOVENOUS FISTULA/SHUNT SURGERY Left 6/17/2020   • COLONOSCOPY N/A 6/28/2021    Procedure: COLONOSCOPY;  Surgeon: Iggy El DO;  Location: [facility] ENDOSCOPY;  Service: Gastroenterology;  Laterality: N/A;  argon to avms right colon   • ENDOSCOPY N/A 6/28/2021    Procedure: ESOPHAGOGASTRODUODENOSCOPY;  Surgeon: Iggy El DO;  Location: [facility] ENDOSCOPY;  Service: Gastroenterology;  Laterality: N/A;   • INTERVENTIONAL RADIOLOGY PROCEDURE N/A 8/23/2017   • INTERVENTIONAL  "RADIOLOGY PROCEDURE Left 6/5/2020   • INTERVENTIONAL RADIOLOGY PROCEDURE Left 8/27/2020   • TESTICLE TORSION REPAIR N/A        ALLERGIES:    Allergies   Allergen Reactions   • Dilaudid [Hydromorphone Hcl] Mental Status Change   • Tape Rash     Just plastic tape         FAMILY HISTORY:  Family History   Problem Relation Age of Onset   • Heart failure Father            REVIEW OF SYSTEMS:      Unable to obtain secondary to change in mentation and disorientation      PHYSICAL EXAMINATION:      VITAL SIGNS:  /99 (BP Location: Right arm, Patient Position: Lying)   Pulse 88   Temp 97.7 °F (36.5 °C) (Temporal)   Resp 16   Ht 180.3 cm (71\")   Wt 110 kg (242 lb 4 oz)   SpO2 95%   BMI 33.79 kg/m²       07/16/21  0558 07/17/21  0625 07/18/21  0416   Weight: 107 kg (236 lb 9.6 oz) 109 kg (241 lb 4 oz) 110 kg (242 lb 4 oz)             CONSTITUTIONAL:  Not in any distress.  Appears confused    EYES: Mild conjunctival Pallor. No Icterus. No Pterygium. Extraocular Movements intact.No ptosis.    ENMT:  Normocephalic, Atraumatic.No Facial Asymmetry noted.    NECK:  No adenopathy.Trachea midline. NO JVD.    RESPIRATORY:  Fair air entry bilateral. No rhonchi or wheezing.Fair respiratory effort.    CARDIOVASCULAR:  S1, S2. Regular rate and rhythm. No murmur or gallop appreciated.    ABDOMEN:  Soft, obese, nontender. Bowel sounds present in all four quadrants.  No Hepatosplenomegaly appreciated.    MUSCULOSKELETAL:  1+ edema.No Calf Tenderness.Decreased range of motion.  AV fistula present on left upper extremity    NEUROLOGIC:    No  Motor  deficit appreciated. Cranial Nerves 2-12 grossly intact.    SKIN : No new skin lesion identified. Skin is warm and moist to touch.    LYMPHATICS: No new enlarged lymph nodes in neck or supraclavicular area.    PSYCHIATRY: Alert, awake and oriented to person only      DIAGNOSTIC DATA:    Glucose   Date Value Ref Range Status   07/18/2021 141 (H) 65 - 99 mg/dL Final     Sodium   Date Value " Ref Range Status   07/18/2021 131 (L) 136 - 145 mmol/L Final     Potassium   Date Value Ref Range Status   07/18/2021 4.3 3.5 - 5.2 mmol/L Final     CO2   Date Value Ref Range Status   07/18/2021 30.0 (H) 22.0 - 29.0 mmol/L Final     Chloride   Date Value Ref Range Status   07/18/2021 90 (L) 98 - 107 mmol/L Final     Anion Gap   Date Value Ref Range Status   07/18/2021 11.0 5.0 - 15.0 mmol/L Final     Creatinine   Date Value Ref Range Status   07/18/2021 4.46 (H) 0.76 - 1.27 mg/dL Final     BUN   Date Value Ref Range Status   07/18/2021 50 (H) 8 - 23 mg/dL Final     BUN/Creatinine Ratio   Date Value Ref Range Status   07/18/2021 11.2 7.0 - 25.0 Final     Calcium   Date Value Ref Range Status   07/18/2021 10.5 8.6 - 10.5 mg/dL Final     eGFR Non  Amer   Date Value Ref Range Status   07/18/2021 13 (L) >60 mL/min/1.73 Final     Comment:     <15 Indicative of kidney failure.     Lab Results   Component Value Date    WBC 9.39 07/18/2021    HGB 8.1 (L) 07/18/2021    HCT 25.4 (L) 07/18/2021    .4 (H) 07/18/2021     07/18/2021     Lab Results   Component Value Date    NEUTROABS 8.70 (H) 07/15/2021    IRON 71 07/07/2021    TIBC 297 (L) 07/07/2021    LABIRON 24 07/07/2021    FERRITIN 691.00 (H) 07/07/2021    [id number] >2,000 (H) 06/08/2021    FOLATE 5.97 06/08/2021     No results found for: , LABCA2, AFPTM, HCGQUANT, , CHROMGRNA, [id number], CEA, REFLABREPO        PATHOLOGY:  * Cannot find OR log *         RADIOLOGY DATA :  CT Lumbar Spine Without Contrast    Result Date: 7/15/2021  1.  Interval development of a markedly heterogenous appearance of the vertebral bodies as well as the osseous structures of the pelvis including the sacrum. Multiple lytic lesions are noted. Findings are consistent with the patient's history of multiple myeloma. 2.  Degenerative discogenic changes of the lumbar spine greatest at L4-5 where there is a diffuse annular bulge with facet arthrosis and ligamentum flavum  hypertrophy resulting in mild to moderate central canal stenosis and subarticular recess narrowing. Electronically signed by:  Marielos Pimentel DO  7/15/2021 6:28 AM CDT Workstation: [id number]    XR Chest 1 View    Result Date: 7/15/2021  Cardiomegaly. Small left basilar density near the costophrenic sulcus which could represent small left pleural effusion as well as atelectasis. Electronically signed by:  Jose Armando Fryepriya DO  7/15/2021 6:16 AM CDT Workstation: [id number]        ASSESSMENT AND PLAN:      1.  Multiple myeloma:  -Yesterday patient and family were planning to start chemotherapy as outpatient.  -Due to uncontrolled pain and worsening overall condition, patient and family has opted to transition care to hospice without any chemotherapy.  -Hospice will be consulted.    2.  Cancer related pain:  -Currently on fentanyl 25 mcg patch along with oxycodone 10 mg every 4 hours.  -Due to uncontrolled pain has required intravenous morphine for total 6 doses since yesterday.  -Agree with hospice care at present    3.  Anemia:  -Multifactorial    4.  End-stage renal disease on hemodialysis    5.  Change in level of care:  -Patient has been made comfort measures only.  Plan is to transition to hospice.  Family has decided no chemotherapy or no hemodialysis going forward.         Vern Cartwright MD  7/18/2021  12:19 CDT        Part of this note may be an electronic transcription/translation of spoken language to printed text using the Dragon Dictation System.

## 2021-07-19 NOTE — PLAN OF CARE
Problem: Fall Injury Risk  Goal: Absence of Fall and Fall-Related Injury  Outcome: Ongoing, Progressing     Problem: Fall Injury Risk  Goal: Absence of Fall and Fall-Related Injury  Intervention: Promote Injury-Free Environment  Recent Flowsheet Documentation  Taken 7/19/2021 0026 by Lori Gray RN  Safety Promotion/Fall Prevention:   activity supervised   assistive device/personal items within reach   clutter free environment maintained   fall prevention program maintained   safety round/check completed   nonskid shoes/slippers when out of bed  Taken 7/18/2021 2219 by Lori Gray RN  Safety Promotion/Fall Prevention:   activity supervised   assistive device/personal items within reach   clutter free environment maintained   fall prevention program maintained   safety round/check completed   muscle strengthening facilitated  Taken 7/18/2021 2218 by Lori Gray RN  Safety Promotion/Fall Prevention:   activity supervised   assistive device/personal items within reach   clutter free environment maintained   fall prevention program maintained   safety round/check completed   nonskid shoes/slippers when out of bed  Taken 7/18/2021 1919 by Lori Gray RN  Safety Promotion/Fall Prevention:   safety round/check completed   nonskid shoes/slippers when out of bed   activity supervised   assistive device/personal items within reach   clutter free environment maintained     Problem: Adult Inpatient Plan of Care  Goal: Plan of Care Review  Outcome: Ongoing, Progressing  Flowsheets  Taken 7/19/2021 0137 by Lori Gray RN  Progress: no change  Outcome Summary: pt comfortable with prn pain med and scheduled pain medication spouse at bedside.  Taken 7/17/2021 1430 by Gabriela Cardona PTA  Plan of Care Reviewed With:   patient   spouse     Problem: Adult Inpatient Plan of Care  Goal: Patient-Specific Goal (Individualized)  Outcome: Ongoing, Progressing     Problem: Adult Inpatient Plan of  Care  Goal: Absence of Hospital-Acquired Illness or Injury  Intervention: Identify and Manage Fall Risk  Recent Flowsheet Documentation  Taken 7/19/2021 0026 by Lori Gray RN  Safety Promotion/Fall Prevention:   activity supervised   assistive device/personal items within reach   clutter free environment maintained   fall prevention program maintained   safety round/check completed   nonskid shoes/slippers when out of bed  Taken 7/18/2021 2219 by Lori Gray RN  Safety Promotion/Fall Prevention:   activity supervised   assistive device/personal items within reach   clutter free environment maintained   fall prevention program maintained   safety round/check completed   muscle strengthening facilitated  Taken 7/18/2021 2218 by Lori Gray RN  Safety Promotion/Fall Prevention:   activity supervised   assistive device/personal items within reach   clutter free environment maintained   fall prevention program maintained   safety round/check completed   nonskid shoes/slippers when out of bed  Taken 7/18/2021 1919 by Lori Gray RN  Safety Promotion/Fall Prevention:   safety round/check completed   nonskid shoes/slippers when out of bed   activity supervised   assistive device/personal items within reach   clutter free environment maintained     Problem: Adult Inpatient Plan of Care  Goal: Absence of Hospital-Acquired Illness or Injury  Intervention: Prevent Skin Injury  Recent Flowsheet Documentation  Taken 7/19/2021 0026 by Lori Gray RN  Body Position: position changed independently  Taken 7/18/2021 2219 by Lori Gray RN  Body Position: position changed independently  Taken 7/18/2021 2218 by Lori Gray RN  Body Position: position changed independently     Problem: Adult Inpatient Plan of Care  Goal: Absence of Hospital-Acquired Illness or Injury  Intervention: Prevent and Manage VTE (venous thromboembolism) Risk  Recent Flowsheet Documentation  Taken 7/18/2021  1919 by Lori Gray, RN  VTE Prevention/Management: (comfort measures) other (see comments)     Problem: Adult Inpatient Plan of Care  Goal: Optimal Comfort and Wellbeing  Outcome: Ongoing, Progressing     Problem: Adult Inpatient Plan of Care  Goal: Readiness for Transition of Care  Outcome: Ongoing, Progressing   Goal Outcome Evaluation:  Plan of Care Reviewed With: patient        Progress: no change  Outcome Summary: pt comfortable with prn pain med and scheduled pain medication spouse at bedside.

## 2021-07-19 NOTE — DISCHARGE PLACEMENT REQUEST
"Ailyn Austin (70 y.o. Male)     Date of Birth Social Security Number Address Home Phone MRN    1950  [id number] [phone] [id number]    [employer] Mar[employer] Status          [employer]        Admission Date Admission Type Admitting Provider Attending Provider Department, Room/Bed    7/15/21 Emergency Benitez Madera MD Kirchner, Quinn J, MD [address], 304/[address]    Discharge Date Discharge Disposition Discharge Destination                       Attending Provider: Benitez Madera MD    Allergies: Dilaudid [Hydromorphone Hcl], Tape    Isolation: None   Infection: None   Code Status: No CPR    Ht: 180.3 cm (71\")   Wt: 110 kg (242 lb 4 oz)    Admission Cmt: None   Principal Problem: Metastatic multiple myeloma to bone (CMS/HCC) [C90.00]                 Active Insurance as of 7/15/2021     Primary Coverage     Payor Plan Insurance Group Employer/Plan Group    MEDICARE MEDICARE A & B      Payor Plan Address Payor Plan Phone Number Payor Plan Fax Number Effective Dates    [address] [phone]  11/1/2013 - None Entered    [facility] [id number]       Subscriber Name Subscriber Birth Date Member ID       AILYN AUSTIN 1950 [id number]           Secondary Coverage     Payor Plan Insurance Group Employer/Plan Group    ANTHEM BLUE CROSS ANTHEM BC  SUPP [id number]     Payor Plan Address Payor Plan Phone Number Payor Plan Fax Number Effective Dates    [address]   12/1/2016 - None Entered    [facility] [id number]       Subscriber Name Subscriber Birth Date Member ID       AILYN AUSTIN 1950 [id number]                 Emergency Contacts      (Rel.) Home Phone Work Phone Mobile Phone    NormanAna (Spouse) [phone] -- [phone]            Insurance Information                MEDICARE/MEDICARE A & B Phone: [phone]    Subscriber: Ailyn Austin Subscriber#: [id number]    Group#:  Precert#:         ANNA [id number]" CROSS/ANNA [facility] SUPP Phone:     Subscriber: Jesús Austin Subscriber#: [id number]    Group#: [id number] Precert#:

## 2021-07-19 NOTE — PROGRESS NOTES
[facility] Medicine Services  INPATIENT PROGRESS NOTE    Length of Stay: 3  Date of Admission: 7/15/2021  Primary Care Physician: Luis Miguel Orourke MD    Subjective   Chief Complaint: No complaints    HPI:       7/19/2021:  Patient still has uncontrolled pain.  He is unable to turn in the bed secondary to discomfort.  Patient was given a total of 20 mg of Morphine for breakthrough pain.  Will increase oxycodone IR to 20 mg q 12 hours and add extended release 10 mg q 12 hours.  Continue Morphine 4 mg Q2 hours as needed for breakthrough pain.     7/18/2021: Per patient's wife, he had a very bad night. He required every 2 hour morphine with breakthrough pain. Due to the severity of pain, he and his wife have opted comfort measures.      7/17/82021: Patient has only asked for 1 morphine shot today but states he is having a lot of breakthrough pain.  States he is unable to lay in the bed due to pain and slept in his recliner last night.    7/16/2021: Patient continues to have uncontrolled pain.  He is unable to walk secondary to leg weakness at this time.    This is a 70-year-old male with past medical history of hypertension, dyslipidemia, end-stage renal disease on hemodialysis (T, TH & Sat), AAA (status post repair) and multiple myeloma that presented to [facility] on 7/15/2021 with complaints of increasing back pain.  The patient was recently diagnosed with multiple myeloma with CT scan showing sclerosis and lytic lesion.  The patient follows with Dr. Cartwright at the [facility].  He is scheduled to undergo second opinion with Dr. Pleitez at [place] during the last week of July.      Review of Systems   Constitutional: Negative for activity change and fatigue.   HENT: Negative for ear pain and sore throat.    Eyes: Negative for pain and discharge.   Respiratory: Negative for cough and shortness of breath.    Cardiovascular: Negative for chest pain and palpitations.      Gastrointestinal: Negative for abdominal pain and nausea.   Endocrine: Negative for cold intolerance and heat intolerance.   Genitourinary: Negative for difficulty urinating and dysuria.   Musculoskeletal: Positive for back pain. Negative for arthralgias and gait problem.   Skin: Negative for color change and rash.   Neurological: Negative for dizziness and weakness.   Psychiatric/Behavioral: Negative for agitation and confusion.        Objective    Temp:  [96 °F (35.6 °C)-97.7 °F (36.5 °C)] 97.6 °F (36.4 °C)  Heart Rate:  [] 92  Resp:  [18] 18  BP: (137-190)/(70-87) 161/70    Physical Exam  Constitutional:       Appearance: He is well-developed.   HENT:      Head: Normocephalic and atraumatic.   Eyes:      Pupils: Pupils are equal, round, and reactive to light.   Cardiovascular:      Rate and Rhythm: Normal rate and regular rhythm.   Pulmonary:      Effort: Pulmonary effort is normal.      Breath sounds: Normal breath sounds.   Abdominal:      General: Bowel sounds are normal.      Palpations: Abdomen is soft.   Musculoskeletal:         General: Normal range of motion.      Cervical back: Normal range of motion and neck supple.   Skin:     General: Skin is warm and dry.   Neurological:      Mental Status: He is alert and oriented to person, place, and time.   Psychiatric:         Behavior: Behavior normal.       Results Review:  I have reviewed the labs, radiology results, and diagnostic studies.    Laboratory Data:   Results from last 7 days   Lab Units 07/18/21  0232 07/17/21  0544 07/16/21  0618 07/15/21  0525   SODIUM mmol/L 131* 129* 131* 135*   POTASSIUM mmol/L 4.3 4.7 4.2 4.2   CHLORIDE mmol/L 90* 89* 94* 96*   CO2 mmol/L 30.0* 29.0 29.0 29.0   BUN mg/dL 50* 64* 38* 60*   CREATININE mg/dL 4.46* 5.94* 4.52* 6.32*   GLUCOSE mg/dL 141* 128* 84 122*   CALCIUM mg/dL 10.5 10.3 10.5 10.3   BILIRUBIN mg/dL  --   --   --  0.3   ALK PHOS U/L  --   --   --  67   ALT (SGPT) U/L  --   --   --  7   AST (SGOT) U/L   --   --   --  10   ANION GAP mmol/L 11.0 11.0 8.0 10.0     Estimated Creatinine Clearance: 18.9 mL/min (A) (by C-G formula based on SCr of 4.46 mg/dL (H)).          Results from last 7 days   Lab Units 07/18/21  0232 07/17/21  0544 07/16/21  0618 07/15/21  0525   WBC 10*3/mm3 9.39 6.10 7.51 10.43   HEMOGLOBIN g/dL 8.1* 8.4* 8.7* 7.8*   HEMATOCRIT % 25.4* 26.4* 27.6* 24.4*   PLATELETS 10*3/mm3 365 273 284 276           Culture Data:   No results found for: BLOODCX  No results found for: URINECX  No results found for: RESPCX  No results found for: WOUNDCX  No results found for: STOOLCX  No components found for: BODYFLD    Radiology Data:   Imaging Results (Last 24 Hours)     ** No results found for the last 24 hours. **          I have reviewed the patient's current medications.     Assessment/Plan     Active Hospital Problems    Diagnosis  POA   • **Metastatic multiple myeloma to bone (CMS/[facility]) [C90.00]  Yes   • ESRF (end stage renal failure) (CMS/[facility]) [N18.6]  Yes       Plan:    Multiple myeloma with metastasis to the bone: Patient changed to comfort measures. Case management consulted for GIP versus home with hospice.  Oral medications increased.  If patient continues to have breakthrough pain, will start continuous PCA.      Discharge Planning: I expect patient to be discharged to home in 1-2 days.    I confirmed that the patient's Advance Care Plan is present, code status is documented, or surrogate decision maker is listed in the patient's medical record.      I have utilized all available immediate resources to obtain, update, or review the patient's current medications.         This document has been electronically signed by DELORIS Sharp on July 19, 2021 11:27 CDT

## 2021-07-19 NOTE — PROGRESS NOTES
Pt has decided to go for hospice. Currently pain well controlled. Family at bedside. If needed plan for morphine pump in am. Discuss with patient and family in detail

## 2021-07-20 NOTE — PROGRESS NOTES
[facility] Medicine Services  INPATIENT PROGRESS NOTE    Length of Stay: 4  Date of Admission: 7/15/2021  Primary Care Physician: Luis Miguel Orourke MD    Subjective   Chief Complaint: No complaints    HPI:       7/20/2021:  The patient is demonstrating swelling from discontinuation of dialysis.  He continues to have uncontrolled pain.  Will increase medications again today.     7/19/2021:  Patient still has uncontrolled pain.  He is unable to turn in the bed secondary to discomfort.  Patient was given a total of 20 mg of Morphine for breakthrough pain.  Will increase oxycodone IR to 20 mg q 12 hours and add extended release 10 mg q 12 hours.  Continue Morphine 4 mg Q2 hours as needed for breakthrough pain.     7/18/2021: Per patient's wife, he had a very bad night. He required every 2 hour morphine with breakthrough pain. Due to the severity of pain, he and his wife have opted comfort measures.      7/17/82021: Patient has only asked for 1 morphine shot today but states he is having a lot of breakthrough pain.  States he is unable to lay in the bed due to pain and slept in his recliner last night.    7/16/2021: Patient continues to have uncontrolled pain.  He is unable to walk secondary to leg weakness at this time.    This is a 70-year-old male with past medical history of hypertension, dyslipidemia, end-stage renal disease on hemodialysis (T, TH & Sat), AAA (status post repair) and multiple myeloma that presented to [facility] on 7/15/2021 with complaints of increasing back pain.  The patient was recently diagnosed with multiple myeloma with CT scan showing sclerosis and lytic lesion.  The patient follows with Dr. Cartwright at the [facility].  He is scheduled to undergo second opinion with Dr. Pleitez at [place] during the last week of July.      Review of Systems   Constitutional: Negative for activity change and fatigue.   HENT: Negative for ear pain and sore  throat.    Eyes: Negative for pain and discharge.   Respiratory: Negative for cough and shortness of breath.    Cardiovascular: Negative for chest pain and palpitations.   Gastrointestinal: Negative for abdominal pain and nausea.   Endocrine: Negative for cold intolerance and heat intolerance.   Genitourinary: Negative for difficulty urinating and dysuria.   Musculoskeletal: Positive for back pain. Negative for arthralgias and gait problem.   Skin: Negative for color change and rash.   Neurological: Negative for dizziness and weakness.   Psychiatric/Behavioral: Negative for agitation and confusion.        Objective    Temp:  [97.6 °F (36.4 °C)-98.3 °F (36.8 °C)] 97.7 °F (36.5 °C)  Heart Rate:  [84-99] 84  Resp:  [18] 18  BP: (138-178)/(72-91) 177/91    Physical Exam  Constitutional:       Appearance: He is well-developed.   HENT:      Head: Normocephalic and atraumatic.   Eyes:      Pupils: Pupils are equal, round, and reactive to light.   Cardiovascular:      Rate and Rhythm: Normal rate and regular rhythm.   Pulmonary:      Effort: Pulmonary effort is normal.      Breath sounds: Normal breath sounds.   Abdominal:      General: Bowel sounds are normal.      Palpations: Abdomen is soft.   Musculoskeletal:         General: Normal range of motion.      Cervical back: Normal range of motion and neck supple.   Skin:     General: Skin is warm and dry.   Neurological:      Mental Status: He is alert and oriented to person, place, and time.   Psychiatric:         Behavior: Behavior normal.       Results Review:  I have reviewed the labs, radiology results, and diagnostic studies.    Laboratory Data:   Results from last 7 days   Lab Units 07/18/21  0232 07/17/21  0544 07/16/21  0618 07/15/21  0525   SODIUM mmol/L 131* 129* 131* 135*   POTASSIUM mmol/L 4.3 4.7 4.2 4.2   CHLORIDE mmol/L 90* 89* 94* 96*   CO2 mmol/L 30.0* 29.0 29.0 29.0   BUN mg/dL 50* 64* 38* 60*   CREATININE mg/dL 4.46* 5.94* 4.52* 6.32*   GLUCOSE mg/dL 141*  128* 84 122*   CALCIUM mg/dL 10.5 10.3 10.5 10.3   BILIRUBIN mg/dL  --   --   --  0.3   ALK PHOS U/L  --   --   --  67   ALT (SGPT) U/L  --   --   --  7   AST (SGOT) U/L  --   --   --  10   ANION GAP mmol/L 11.0 11.0 8.0 10.0     Estimated Creatinine Clearance: 18.9 mL/min (A) (by C-G formula based on SCr of 4.46 mg/dL (H)).          Results from last 7 days   Lab Units 07/18/21  0232 07/17/21  0544 07/16/21  0618 07/15/21  0525   WBC 10*3/mm3 9.39 6.10 7.51 10.43   HEMOGLOBIN g/dL 8.1* 8.4* 8.7* 7.8*   HEMATOCRIT % 25.4* 26.4* 27.6* 24.4*   PLATELETS 10*3/mm3 365 273 284 276           Culture Data:   No results found for: BLOODCX  No results found for: URINECX  No results found for: RESPCX  No results found for: WOUNDCX  No results found for: STOOLCX  No components found for: BODYFLD    Radiology Data:   Imaging Results (Last 24 Hours)     ** No results found for the last 24 hours. **          I have reviewed the patient's current medications.     Assessment/Plan     Active Hospital Problems    Diagnosis  POA   • **Metastatic multiple myeloma to bone (CMS/[facility]) [C90.00]  Yes   • ESRF (end stage renal failure) (CMS/[facility]) [N18.6]  Yes       Plan:    Multiple myeloma with metastasis to the bone: Patient changed to comfort measures. Case management consulted for GIP versus home with hospice.  Oral medications increased.  If patient continues to have breakthrough pain, will start continuous PCA.      Discharge Planning: I expect patient to be discharged to home in 1-2 days.    I confirmed that the patient's Advance Care Plan is present, code status is documented, or surrogate decision maker is listed in the patient's medical record.      I have utilized all available immediate resources to obtain, update, or review the patient's current medications.         This document has been electronically signed by DELORIS Sharp on July 20, 2021 12:48 CDT

## 2021-07-21 PROBLEM — N18.6 ESRD (END STAGE RENAL DISEASE) (HCC): Status: ACTIVE | Noted: 2021-01-01

## 2021-07-21 NOTE — CONSULTS
Adult Nutrition  Assessment    Patient Name:  Jesús Austin  YOB: 1950  MRN: [id number]  Admit Date:  7/15/2021    Assessment Date:  7/21/2021    Comments:  Pt with dx metastatic multiple myeloma to bone, back pain, ESRD, HTN, chronic anemia.  Pt is receiving Comfort care.  [place] 0% - 4x.  RN indicates pt is currently unable to eat.  BUN, Creat are elevated consistent with dx ESRD.  Meds reviewed.  Family made no request.    Reason for Assessment     Row Name 07/21/21 1719          Reason for Assessment    Reason For Assessment  per organizational policy Length of Stay             Labs/Tests/Procedures/Meds     Row Name 07/21/21 1720          Labs/Procedures/Meds    Lab Results Reviewed  reviewed        Medications    Pertinent Medications Reviewed  reviewed           Estimated/Assessed Needs     Row Name 07/21/21 1720          Calculation Measurements    Weight Used For Calculations  86.1 kg (189 lb 13.1 oz)        Estimated/Assessed Needs    Additional Documentation  Calorie Requirements (Group);Fluid Requirements (Group);Ford-St. Jeor Equation (Group);Protein Requirements (Group)        Calorie Requirements    Estimated Calorie Requirement (kcal/day)  2123     Estimated Calorie Need Method  Ford-St Jeor        Ford-St. Jeor Equation    RMR (Ford-St. Jeor Equation)  1633.125        Protein Requirements    Weight Used For Protein Calculations  86.1 kg (189 lb 13.1 oz)     Est Protein Requirement Amount (gms/kg)  1.2 gm protein     Estimated Protein Requirements (gms/day)  103.32        Fluid Requirements    Fluid Requirements (mL/day)  2152     RDA Method (mL)  2152         Nutrition Prescription Ordered     Row Name 07/21/21 1722          Nutrition Prescription PO    Current PO Diet  Regular         Evaluation of Received Nutrient/Fluid Intake     Row Name 07/21/21 1723          PO Evaluation    Number of Meals  4     % PO Intake  0% - 4x               Electronically signed  by:  Abida Carlton RD  07/21/21 17:24 CDT

## 2021-07-21 NOTE — PLAN OF CARE
Goal Outcome Evaluation:  Plan of Care Reviewed With: family        Progress: no change  Outcome Summary: Initial assessement.  Pt is receiving Comfort Care.

## 2021-07-21 NOTE — PROGRESS NOTES
[facility] Medicine Services  INPATIENT PROGRESS NOTE    Length of Stay: 5  Date of Admission: 7/15/2021  Primary Care Physician: Luis Miguel Orourke MD    Subjective   Chief Complaint: No complaints    HPI:       7/21/2021:  Patient is having air hunger and dyspnea today.  PCA morphine increased.  Atropine drops and scopolamine patch added.  Hospice to evaluate for GIP.     7/20/2021:  The patient is demonstrating swelling from discontinuation of dialysis.  He continues to have uncontrolled pain.  Will increase medications again today.     7/19/2021:  Patient still has uncontrolled pain.  He is unable to turn in the bed secondary to discomfort.  Patient was given a total of 20 mg of Morphine for breakthrough pain.  Will increase oxycodone IR to 20 mg q 12 hours and add extended release 10 mg q 12 hours.  Continue Morphine 4 mg Q2 hours as needed for breakthrough pain.     7/18/2021: Per patient's wife, he had a very bad night. He required every 2 hour morphine with breakthrough pain. Due to the severity of pain, he and his wife have opted comfort measures.      7/17/82021: Patient has only asked for 1 morphine shot today but states he is having a lot of breakthrough pain.  States he is unable to lay in the bed due to pain and slept in his recliner last night.    7/16/2021: Patient continues to have uncontrolled pain.  He is unable to walk secondary to leg weakness at this time.    This is a 70-year-old male with past medical history of hypertension, dyslipidemia, end-stage renal disease on hemodialysis (T, TH & Sat), AAA (status post repair) and multiple myeloma that presented to [facility] on 7/15/2021 with complaints of increasing back pain.  The patient was recently diagnosed with multiple myeloma with CT scan showing sclerosis and lytic lesion.  The patient follows with Dr. Cartwright at the [facility].  He is scheduled to undergo second opinion with Dr. Pleitez at  [place] during the last week of July.      Review of Systems   Constitutional: Negative for activity change and fatigue.   HENT: Negative for ear pain and sore throat.    Eyes: Negative for pain and discharge.   Respiratory: Negative for cough and shortness of breath.    Cardiovascular: Negative for chest pain and palpitations.   Gastrointestinal: Negative for abdominal pain and nausea.   Endocrine: Negative for cold intolerance and heat intolerance.   Genitourinary: Negative for difficulty urinating and dysuria.   Musculoskeletal: Positive for back pain. Negative for arthralgias and gait problem.   Skin: Negative for color change and rash.   Neurological: Negative for dizziness and weakness.   Psychiatric/Behavioral: Negative for agitation and confusion.        Objective    Temp:  [97.9 °F (36.6 °C)-98 °F (36.7 °C)] 98 °F (36.7 °C)  Heart Rate:  [89-94] 94  Resp:  [18-20] 18  BP: (180-182)/(88-90) 180/88    Physical Exam  Constitutional:       Appearance: He is well-developed.   HENT:      Head: Normocephalic and atraumatic.   Eyes:      Pupils: Pupils are equal, round, and reactive to light.   Cardiovascular:      Rate and Rhythm: Normal rate and regular rhythm.   Pulmonary:      Effort: Pulmonary effort is normal.      Breath sounds: Normal breath sounds.   Abdominal:      General: Bowel sounds are normal.      Palpations: Abdomen is soft.   Musculoskeletal:         General: Normal range of motion.      Cervical back: Normal range of motion and neck supple.   Skin:     General: Skin is warm and dry.   Neurological:      Mental Status: He is alert and oriented to person, place, and time.   Psychiatric:         Behavior: Behavior normal.       Results Review:  I have reviewed the labs, radiology results, and diagnostic studies.    Laboratory Data:   Results from last 7 days   Lab Units 07/18/21  0232 07/17/21  0544 07/16/21  0618 07/15/21  0525 07/15/21  0525   SODIUM mmol/L 131* 129* 131*   < > 135*   POTASSIUM  mmol/L 4.3 4.7 4.2   < > 4.2   CHLORIDE mmol/L 90* 89* 94*   < > 96*   CO2 mmol/L 30.0* 29.0 29.0   < > 29.0   BUN mg/dL 50* 64* 38*   < > 60*   CREATININE mg/dL 4.46* 5.94* 4.52*   < > 6.32*   GLUCOSE mg/dL 141* 128* 84   < > 122*   CALCIUM mg/dL 10.5 10.3 10.5   < > 10.3   BILIRUBIN mg/dL  --   --   --   --  0.3   ALK PHOS U/L  --   --   --   --  67   ALT (SGPT) U/L  --   --   --   --  7   AST (SGOT) U/L  --   --   --   --  10   ANION GAP mmol/L 11.0 11.0 8.0   < > 10.0    < > = values in this interval not displayed.     Estimated Creatinine Clearance: 18.9 mL/min (A) (by C-G formula based on SCr of 4.46 mg/dL (H)).          Results from last 7 days   Lab Units 07/18/21  0232 07/17/21  0544 07/16/21  0618 07/15/21  0525   WBC 10*3/mm3 9.39 6.10 7.51 10.43   HEMOGLOBIN g/dL 8.1* 8.4* 8.7* 7.8*   HEMATOCRIT % 25.4* 26.4* 27.6* 24.4*   PLATELETS 10*3/mm3 365 273 284 276           Culture Data:   No results found for: BLOODCX  No results found for: URINECX  No results found for: RESPCX  No results found for: WOUNDCX  No results found for: STOOLCX  No components found for: BODYFLD    Radiology Data:   Imaging Results (Last 24 Hours)     ** No results found for the last 24 hours. **          I have reviewed the patient's current medications.     Assessment/Plan     Active Hospital Problems    Diagnosis  POA   • **Metastatic multiple myeloma to bone (CMS/HCC) [C90.00]  Yes   • ESRF (end stage renal failure) (CMS/HCC) [N18.6]  Yes       Plan:    Multiple myeloma with metastasis to the bone: Comfort measures.  Good oral care.  PCA morphine increased today for air hunger/ dyspnea.  Atropine and scopolamine added.         I confirmed that the patient's Advance Care Plan is present, code status is documented, or surrogate decision maker is listed in the patient's medical record.      I have utilized all available immediate resources to obtain, update, or review the patient's current medications.         This document has been  electronically signed by DELORIS Sharp on July 21, 2021 11:41 CDT

## 2021-07-21 NOTE — HOSPICE
"Patient admitted to [facility] inpatient room # 304. with diagnosis of  End stage renal disease.  spouse request patient to be kept comfortable stating\"I just don't want him in pain.\"   Patient's last dialysis Saturday 7-.  Patient is uncomfortable due to pain without Morphine drip @ 1mg/hr of Morphine. Patient's non-verbal vas# # 5 on admission..  Spouse states \" patient wants no life sustaining measures only comfort measures.  Patient noted with irregular breathing and 15-20 seconds of apnea noted.  Patient unable to lie flat in the bed without severe shortness of breath with moderate shortness of breath noted with head of bed elevated,   concerns noted. Spouse states patient is a man of God and is ready to see Wesley face to face.  No spiritual concerns noted.  Wife requested no CPR and does not want to go back to acute care hospitalization or aggressive treatment.  Admission booklet explained and instructions given on hospice availability and to access 24/7 hospice nurse on call.  Family verbalized understanding of importance of avoiding transmission of covid 19 virus."

## 2021-07-22 ENCOUNTER — HOME CARE VISIT (OUTPATIENT)
Dept: HOSPICE | Facility: HOSPICE | Age: 71
End: 2021-07-22

## 2021-07-22 NOTE — NURSING NOTE
home of choice notified at 4849. Spoke to Jacky Ness.  spoke to Spouse of patient.  Arrangements to be made at 1330 2021.     Hospice nurse present at this time

## 2021-07-22 NOTE — NURSING NOTE
At 2014 patient pronounced by 2 registered nurses TAD Benitez, RN and ZOFIA Stevenson, RN. Family gathered around bedside. Charge nurse notified.    At 2023, ALEXANDRE notified and ruled out any donation. Hospice nurse notifeed by Charge Nurse.    At 2039, ESAU PUENTES was notified and House Supervisor Stephan Chance RN

## 2021-07-22 NOTE — H&P
[facility] Medicine Admission      Date of Admission: 7/21/2021      Primary Care Physician: Luis Miguel Orourke MD      Chief Complaint: Back pain    HPI: This is a 70-year-old male with past medical history of hypertension, dyslipidemia, end-stage renal disease on hemodialysis (T, TH & Sat), AAA (status post repair) and multiple myeloma with metastasis to the bone that presented to [facility] on 7/15/2021 with complaints of increasing back pain. The patient's conditioned worsened and his pain became intolerable. The patient and wife opted for hospice and the patient was transitioned to [facility] hospice. Dialysis was discontinued and the patient was transitioned to a PCA morphine pump.     Concurrent Medical History:  has a past medical history of Abdominal aortic aneurysm (CMS/HCC), Allergic rhinitis, BPH (benign prostatic hyperplasia), Carotid artery stenosis, Chronic anemia, Diverticulitis, ESRD (end stage renal disease) on dialysis (CMS/HCC), History of transfusion, Hypercholesteremia, Hypertension, Hypothyroidism, Malignant tumor of Meckel's diverticulum (CMS/HCC), Multiple myeloma (CMS/HCC), Nephrolithiasis, Osteoarthritis, Rheumatic fever, Secondary hyperparathyroidism of renal origin (CMS/HCC), and Stroke (CMS/HCC).    Past Surgical History:  has a past surgical history that includes AAA repair, open; Testicle Torsion Repair (N/A); AV fistula placement (Left); Interventional radiology procedure (N/A, 8/23/2017); Interventional radiology procedure (Left, 6/5/2020); arteriovenous fistula/shunt surgery (Left, 6/17/2020); Interventional radiology procedure (Left, 8/27/2020); Esophagogastroduodenoscopy (N/A, 6/28/2021); and Colonoscopy (N/A, 6/28/2021).    Family History: family history includes Heart failure in his father.    Social History:  reports that he has never smoked. He has never used smokeless tobacco. He reports that he does not drink alcohol and does not use  drugs.    Allergies:   Allergies   Allergen Reactions   • Dilaudid [Hydromorphone Hcl] Mental Status Change   • Tape Rash     Just plastic tape       Medications:   Prior to Admission medications    Medication Sig Start Date End Date Taking? Authorizing Provider   acetaminophen (TYLENOL) 650 MG 8 hr tablet Take 650 mg by mouth Every 8 (Eight) Hours As Needed for Mild Pain .    Bouchra Mack MD   albuterol sulfate  (90 Base) MCG/ACT inhaler INHALE 2 PUFFS INTO THE LUNGS EVERY 4 (FOUR) HOURS AS NEEDED FOR WHEEZING. 6/30/21   Bouchra Mack MD   allopurinol (ZYLOPRIM) 100 MG tablet Take 100 mg by mouth Daily.    Emergency, Nurse POLLY Thomson   amitriptyline (ELAVIL) 25 MG tablet Take 25 mg by mouth Every Night.    ProviderBouchra MD   amLODIPine (NORVASC) 10 MG tablet Take 1 tablet by mouth Daily. 6/23/21   Surinder Davidson MD   aspirin 81 MG EC tablet Take 81 mg by mouth Daily.    ProviderBouchra MD   CO ENZYME Q-10 PO Take 100 mg by mouth Daily.    Emergency, Nurse Epic, RN   dexamethasone (DECADRON) 2 MG tablet Take 2 mg by mouth 3 (Three) Times a Day.    Bouchra Mack MD   docusate sodium (COLACE) 100 MG capsule Take 100 mg by mouth 2 (Two) Times a Day.    Bouchra Mack MD   fluticasone (FLONASE) 50 MCG/ACT nasal spray 1 spray into the nostril(s) as directed by provider Daily As Needed.    Emergency, Nurse Berto RN   folic acid (FOLVITE) 1 MG tablet Take 1 tablet by mouth Daily. 6/9/21   Vern Cartwright MD   furosemide (LASIX) 80 MG tablet Take 80 mg by mouth Daily. On non dialysis days, MWF and sunday    Emergency, Nurse Berto RN   hydrALAZINE (APRESOLINE) 25 MG tablet Take 1 tablet by mouth Every 8 (Eight) Hours. 6/22/21   Surinder Davidson MD   HYDROcodone-acetaminophen (NORCO) 7.5-325 MG per tablet Take 1 tablet by mouth Every 6 (Six) Hours As Needed for Moderate Pain . 7/7/21   Vern Cartwright MD   levothyroxine (SYNTHROID, LEVOTHROID) 50 MCG tablet Take 50 mcg by  mouth Daily.    Emergency, Nurse Epic, RN   lidocaine-prilocaine (EMLA) 2.5-2.5 % cream APPLY SMALL AMOUNT TO ACCESS SITE (AVF) 1 TO 2 HOURS BEFORE DIALYSIS. COVER WITH OCCLUSIVE DRESSING (SARAN WRAP) 4/1/21   Bouchra Mack MD   Methoxy PEG-Epoetin Beta (MIRCERA IJ) 225 mcg Every 14 (Fourteen) Days. 7/1/21 6/30/22  Bouchra Mack MD   metoprolol succinate XL (TOPROL-XL) 25 MG 24 hr tablet Take 25 mg by mouth Every Night.  Patient not taking: Reported on 7/15/2021    EmergencyNurse Berto RN   metoprolol tartrate (LOPRESSOR) 25 MG tablet Take 25 mg by mouth Every Night.    Bouchra Mack MD   Omega-3 Fatty Acids (FISH OIL) 1000 MG capsule capsule Take 1,200 mg by mouth Every Night.    Nurse Berto Toro RN   omeprazole (priLOSEC) 20 MG capsule Take 20 mg by mouth Daily.    Bouchra Mack MD   rosuvastatin (CRESTOR) 10 MG tablet Take 10 mg by mouth Every Night.    Nurse Berto Toro RN   sevelamer (RENVELA) 800 MG tablet Take 2,400 mg by mouth 3 (Three) Times a Day With Meals. Takes 3 pills before every meal    Bouchra Mack MD   sodium polystyrene (KAYEXALATE) 15 GM/60ML suspension Take 15 g by mouth Daily.    Bouchra Mack MD       Review of Systems:  Review of Systems   Unable to perform ROS: Acuity of condition      Otherwise complete ROS is negative except as mentioned above.    Physical Exam:   Temp:  [96.7 °F (35.9 °C)-97.7 °F (36.5 °C)] 96.7 °F (35.9 °C)  Heart Rate:  [110-119] 110  Resp:  [7-10] 10  BP: ()/(48-66) 93/48  Physical Exam  Constitutional:       Appearance: He is well-developed. He is toxic-appearing.   HENT:      Head: Normocephalic and atraumatic.   Eyes:      Pupils: Pupils are equal, round, and reactive to light.   Cardiovascular:      Rate and Rhythm: Normal rate and regular rhythm.   Pulmonary:      Effort: Bradypnea and prolonged expiration present.   Abdominal:      General: Bowel sounds are normal.      Palpations: Abdomen is  soft.   Musculoskeletal:         General: Normal range of motion.      Cervical back: Normal range of motion and neck supple.   Skin:     General: Skin is dry.      Coloration: Skin is pale.   Neurological:      Mental Status: He is unresponsive.       Results Reviewed:  I have personally reviewed current lab, radiology, and data and agree with results.  Lab Results (last 24 hours)     ** No results found for the last 24 hours. **        Imaging Results (Last 24 Hours)     ** No results found for the last 24 hours. **            Assessment:    Active Hospital Problems    Diagnosis    • ESRD (end stage renal disease) (CMS/[facility])              Plan:  Multiple myeloma with metastasis to the bone: Comfort measures.  Good oral care.  PCA morphine increased today for air hunger/ dyspnea.  Atropine and scopolamine added.     I discussed the patient's findings and my recommendations with: Patient's wife.    I confirmed that the patient's Advance Care Plan is present, code status is documented, or surrogate decision maker is listed in the patient's medical record.      I have utilized all available immediate resources to obtain, update, or review the patient's current medications.          This document has been electronically signed by DELORIS Sharp on July 22, 2021 06:28 CDT

## 2021-07-22 NOTE — DISCHARGE SUMMARY
[facility] Medicine Services  DEATH SUMMARY       Date of Admission: 7/21/2021  Date of Death:  07/21/2021 at approximately 2014  Primary Care Physician: Luis Miguel Orourke MD    Presenting Problem/History of Present Illness:  ESRD (end stage renal disease) (CMS/HCC) [N18.6]  ESRD (end stage renal disease) (CMS/HCC) [N18.6]  ESRD (end stage renal disease) (CMS/HCC) [N18.6]     Final Death Diagnoses:  Active Hospital Problems    Diagnosis    • ESRD (end stage renal disease) (CMS/HCC)        Consults:   Consults     Date and Time Order Name Status Description    7/15/2021  1:50 PM Hematology & Oncology Inpatient Consult Completed     7/15/2021 12:06 PM Inpatient Nephrology Consult Completed     6/21/2021  4:40 PM Inpatient Gastroenterology Consult Completed     6/20/2021  7:01 PM Hematology & Oncology Inpatient Consult Completed     6/20/2021  7:01 PM Inpatient Nephrology Consult Completed           Hospital Course:  This is a 70-year-old male with past medical history of hypertension, dyslipidemia, end-stage renal disease on hemodialysis (T, TH & Sat), AAA (status post repair) and multiple myeloma with metastasis to the bone that presented to [facility] on 7/15/2021 with complaints of increasing back pain. The patient's conditioned worsened and his pain became intolerable. The patient and wife opted for hospice and the patient was transitioned to [facility] hospice. Dialysis was discontinued and the patient was transitioned to a PCA morphine pump.  He passed peacefully in the presence of his family on 7/21/2021 at 2014.            This document has been electronically signed by DELORIS Sharp on July 22, 2021 06:20 CDT

## 2021-07-22 NOTE — NURSING NOTE
Discussed with CECIL Almanzar, about patient and orders for hospice. Janet stated she was not informed of the patient being accepted at this time. Orders for hospice medications were placed on inpatient registration and may be lost when patient is discharged and readmitted to Hospice.

## 2021-07-22 NOTE — NURSING NOTE
Patient has diagnosis of multiple myeloma with mets to bone that contributes to uncontrolled pain. Patient was administered oral pain medication as well as IV pain medication prn around clock that was not sufficient in managing pain symptoms. Patient would move from bed to chair to help him sleep due to so much pain earlier in admission. Patient was started on morphine PCA pump 7/20/2021 with continuous infusion of 1 mg/hr. Patient's pain improved and only required oral pain medication once until patient declined and  became unable to swallow pills this am 7/21/2021. Patient became more restless and morphine PCA dosage increased to 1.25 mg/hr continuous rate. Patient resting comfortably and pain seems to be well controlled. Nonverbal indicators present of pain controlled. This would not be able to be achieved at home due to patient needing continuous infusion of IV pain medication. Patient resting quietly at this time with both eyes closed. Snoring present. Wife at bedside informs RN patient has not woke up or been restless since IV morphine dosage increased. Pain goal assumed to be met at this time. Will continue to monitor patient

## 2021-07-22 NOTE — DISCHARGE SUMMARY
[facility] Medicine Services  DISCHARGE SUMMARY       Date of Admission: 07/15/2021  Date of Discharge:  7/21/2021  Primary Care Physician: Luis Miguel Orourke MD    Presenting Problem/History of Present Illness:  ESRD (end stage renal disease) (CMS/[facility]) [N18.6]  ESRD (end stage renal disease) (CMS/HCC) [N18.6]  ESRD (end stage renal disease) (CMS/HCC) [N18.6]     Final Discharge Diagnoses:  Active Hospital Problems    Diagnosis    • ESRD (end stage renal disease) (CMS/[facility])        Consults:   Consults     Date and Time Order Name Status Description    7/15/2021  1:50 PM Hematology & Oncology Inpatient Consult Completed     7/15/2021 12:06 PM Inpatient Nephrology Consult Completed     6/21/2021  4:40 PM Inpatient Gastroenterology Consult Completed     6/20/2021  7:01 PM Hematology & Oncology Inpatient Consult Completed     6/20/2021  7:01 PM Inpatient Nephrology Consult Completed           Pertinent Test Results:   Lab Results (last 24 hours)     ** No results found for the last 24 hours. **        Imaging Results (Last 24 Hours)     ** No results found for the last 24 hours. **        Chief Complaint on Day of Discharge: No complaints    Hospital Course:  This is a 70-year-old male with past medical history of hypertension, dyslipidemia, end-stage renal disease on hemodialysis (T, TH & Sat), AAA (status post repair) and multiple myeloma that presented to [facility] on 7/15/2021 with complaints of increasing back pain.  The patient and wife opted for hospice and the patient was transitioned to [facility] hospice.     Condition on Discharge:  Stable.     Physical Exam on Discharge:  There were no vitals taken for this visit.  Physical Exam  Constitutional:       Appearance: He is well-developed.   HENT:      Head: Normocephalic and atraumatic.   Eyes:      Pupils: Pupils are equal, round, and reactive to light.   Cardiovascular:      Rate and Rhythm: Normal rate and regular rhythm.    Pulmonary:      Effort: Pulmonary effort is normal.      Breath sounds: Normal breath sounds.   Abdominal:      General: Bowel sounds are normal.      Palpations: Abdomen is soft.   Musculoskeletal:         General: Normal range of motion.      Cervical back: Normal range of motion and neck supple.   Skin:     General: Skin is warm and dry.   Neurological:      Mental Status: He is alert and oriented to person, place, and time.   Psychiatric:         Behavior: Behavior normal.       Discharge Disposition: Hospice.       Discharge Medications:     Discharge Medications      ASK your doctor about these medications      Instructions Start Date   acetaminophen 650 MG 8 hr tablet  Commonly known as: TYLENOL   650 mg, Oral, Every 8 Hours PRN      albuterol sulfate  (90 Base) MCG/ACT inhaler  Commonly known as: PROVENTIL HFA;VENTOLIN HFA;PROAIR HFA   INHALE 2 PUFFS INTO THE LUNGS EVERY 4 (FOUR) HOURS AS NEEDED FOR WHEEZING.      allopurinol 100 MG tablet  Commonly known as: ZYLOPRIM   100 mg, Oral, Daily      amitriptyline 25 MG tablet  Commonly known as: ELAVIL   25 mg, Oral, Nightly      amLODIPine 10 MG tablet  Commonly known as: NORVASC   10 mg, Oral, Every 24 Hours Scheduled      aspirin 81 MG EC tablet   81 mg, Oral, Daily      CO ENZYME Q-10 PO   100 mg, Oral, Daily      dexamethasone 2 MG tablet  Commonly known as: DECADRON   2 mg, Oral, 3 Times Daily      docusate sodium 100 MG capsule  Commonly known as: COLACE   100 mg, Oral, 2 Times Daily      fish oil 1000 MG capsule capsule   1,200 mg, Oral, Nightly      fluticasone 50 MCG/ACT nasal spray  Commonly known as: FLONASE   1 spray, Nasal, Daily PRN      folic acid 1 MG tablet  Commonly known as: FOLVITE   1 mg, Oral, Daily      furosemide 80 MG tablet  Commonly known as: LASIX   80 mg, Oral, Daily, On non dialysis days, MWF and sunday      hydrALAZINE 25 MG tablet  Commonly known as: APRESOLINE   25 mg, Oral, Every 8 Hours Scheduled       HYDROcodone-acetaminophen 7.5-325 MG per tablet  Commonly known as: NORCO   1 tablet, Oral, Every 6 Hours PRN      levothyroxine 50 MCG tablet  Commonly known as: SYNTHROID, LEVOTHROID   50 mcg, Oral, Daily      lidocaine-prilocaine 2.5-2.5 % cream  Commonly known as: EMLA   APPLY SMALL AMOUNT TO ACCESS SITE (AVF) 1 TO 2 HOURS BEFORE DIALYSIS. COVER WITH OCCLUSIVE DRESSING (SARAN WRAP)      metoprolol succinate XL 25 MG 24 hr tablet  Commonly known as: TOPROL-XL   25 mg, Nightly      metoprolol tartrate 25 MG tablet  Commonly known as: LOPRESSOR   25 mg, Oral, Nightly      MIRCERA IJ   225 mcg, Every 14 Days      omeprazole 20 MG capsule  Commonly known as: priLOSEC   20 mg, Oral, Daily      rosuvastatin 10 MG tablet  Commonly known as: CRESTOR   10 mg, Oral, Nightly      sevelamer 800 MG tablet  Commonly known as: RENVELA   2,400 mg, Oral, 3 Times Daily With Meals, Takes 3 pills before every meal      sodium polystyrene 15 GM/60ML suspension  Commonly known as: KAYEXALATE   15 g, Oral, Daily             Discharge Diet: As tolerated    Activity at Discharge: As tolerated     Discharge Care Plan/Instructions: As above.       This document has been electronically signed by DELORIS Sharp on July 21, 2021 20:10 CDT        Time: Greater than 30 minutes

## 2021-07-23 ENCOUNTER — APPOINTMENT (OUTPATIENT)
Dept: ONCOLOGY | Facility: CLINIC | Age: 71
End: 2021-07-23

## 2021-07-26 NOTE — HOSPICE
Family leaving hospital as this signee entering building,  Staff pronounced per [facility] staff @ 20:14.  Jayy Elizondo  Home called per [facility] staff.  [facility] staff preformed post mortem care and transported patient to the [facility].

## 2021-07-26 NOTE — HOSPICE
Patient lying in hospital bed, family at bedside. Patiented noted with pain  if patient arouses. Spouse states patient continues to speak to family at this time.  Discussed hospice care with spouse.  Spouse request patient be comfortable, discussed Morphine drip for consistent pain control. Will plan for hospice admission tomorrow.  Spouse verbalized understanding and request admission tomorrow to hospice care.

## (undated) DEVICE — SYR LL 3CC

## (undated) DEVICE — TOWEL,OR,DSP,ST,NATURAL,DLX,4/PK,20PK/CS: Brand: MEDLINE

## (undated) DEVICE — SUT MONOCRYL 4/0 PS2 27IN Y426H ETY426H

## (undated) DEVICE — SUT ETHLN 3-0 FS118IN 663H

## (undated) DEVICE — PART NUMBER 108260, VTI 8 MHZ DISPOSABLE DOPPLER PROBE, STRAIGHT, BOX: Brand: VTI 8 MHZ DISPOSABLE DOPPLER PROBE, STRAIGHT, BOX

## (undated) DEVICE — I-KNIFE CUTTING INSTRUMENT 15 DEGREE: Brand: I-KNIFE

## (undated) DEVICE — GLV SURG SENSICARE PI PF LF 7 GRN STRL

## (undated) DEVICE — SUT PDS 3/0 SH 27IN DYED Z316H

## (undated) DEVICE — MAD PERIPHERAL VASCULAR-LF: Brand: MEDLINE INDUSTRIES, INC.

## (undated) DEVICE — PINNACLE INTRODUCER SHEATH: Brand: PINNACLE

## (undated) DEVICE — SYS SKIN CLS DERMABOND PRINEO W/22CM MESH TP

## (undated) DEVICE — SUT ETHLN 2/0 FS 18IN 664H

## (undated) DEVICE — SINGLE-USE BIOPSY FORCEPS: Brand: RADIAL JAW 4

## (undated) DEVICE — DRP FISTULAGRAM 45X60

## (undated) DEVICE — SUT VICRYL 4/0 SUTUPAK 18 J109T

## (undated) DEVICE — STERILE POLYISOPRENE POWDER-FREE SURGICAL GLOVES WITH EMOLLIENT COATING: Brand: PROTEXIS

## (undated) DEVICE — SKIN PREP TRAY W/CHG: Brand: MEDLINE INDUSTRIES, INC.

## (undated) DEVICE — Device

## (undated) DEVICE — KT INTRO MINISTICK MAX W/GW PALLADIUM ECHO 4F 21G 7CM

## (undated) DEVICE — SKIN AFFIX SURG ADHESIVE 72/CS 0.55ML: Brand: MEDLINE

## (undated) DEVICE — DRAPE,U/ SHT,SPLIT,PLAS,STERIL: Brand: MEDLINE

## (undated) DEVICE — DRSNG SURESITE WNDW 4X4.5

## (undated) DEVICE — SUT MNCRYL 3/0 PS2 27IN Y427H

## (undated) DEVICE — GLIDEPATH HEMODIALYSIS CATH, ST, DL, 14.5 FR. 23CM: Brand: GLIDEPATH LONG-TERM HEMODIALYSIS CATHETER WITH PRELOADED STYLET

## (undated) DEVICE — SUT PROLN CARDIO BV1 6/0 24IN 8805H

## (undated) DEVICE — BITEBLOCK ENDO W/STRAP 60F A/ LF DISP

## (undated) DEVICE — CONQUEST® PTA BALLOON DILATATION CATHETER 6 MM X 40 MM, 75 CM CATHETER: Brand: CONQUEST®

## (undated) DEVICE — SUT PROLENE 7-0 BV175-7 24IN DB ETH8766H

## (undated) DEVICE — SUT VIC 3/0 SH 27IN J416H

## (undated) DEVICE — DRSNG TELFA PAD NONADH STR 1S 3X8IN

## (undated) DEVICE — DRAPE,EXTREMITY,89X128,STERILE: Brand: MEDLINE

## (undated) DEVICE — PATIENT RETURN ELECTRODE, SINGLE-USE, CONTACT QUALITY MONITORING, ADULT, WITH 9FT CORD, FOR PATIENTS WEIGING OVER 33LBS. (15KG): Brand: MEGADYNE

## (undated) DEVICE — TBG HI PRESSURE 500PSI 20IN

## (undated) DEVICE — SHEET, T, LAPAROTOMY, STERILE: Brand: MEDLINE

## (undated) DEVICE — PK ANGIO LF 60

## (undated) DEVICE — RADIFOCUS GLIDEWIRE: Brand: GLIDEWIRE

## (undated) DEVICE — BNDG COBAN S/ADHR WRP LF 1IN 5YD TN PK/5

## (undated) DEVICE — FIAPC® PROBE W/ FILTER 2200 C OD 2.3MM/6.9FR; L 2.2M/7.2FT: Brand: ERBE

## (undated) DEVICE — CLMP VASC VASC-STATT2 PLS GENTL JAW MIDI STR 75-80PSI YEL